# Patient Record
Sex: MALE | Race: OTHER | HISPANIC OR LATINO | URBAN - METROPOLITAN AREA
[De-identification: names, ages, dates, MRNs, and addresses within clinical notes are randomized per-mention and may not be internally consistent; named-entity substitution may affect disease eponyms.]

---

## 2018-08-01 ENCOUNTER — OUTPATIENT (OUTPATIENT)
Dept: OUTPATIENT SERVICES | Facility: HOSPITAL | Age: 68
LOS: 1 days | End: 2018-08-01
Payer: MEDICARE

## 2018-08-01 PROCEDURE — G9001: CPT

## 2018-08-23 ENCOUNTER — INPATIENT (INPATIENT)
Facility: HOSPITAL | Age: 68
LOS: 6 days | Discharge: NURSING, REHAB OR LONG-TERM CARE | DRG: 562 | End: 2018-08-30
Attending: HOSPITALIST | Admitting: HOSPITALIST
Payer: MEDICARE

## 2018-08-23 VITALS
RESPIRATION RATE: 20 BRPM | TEMPERATURE: 98 F | DIASTOLIC BLOOD PRESSURE: 79 MMHG | HEIGHT: 66 IN | SYSTOLIC BLOOD PRESSURE: 160 MMHG | HEART RATE: 82 BPM | OXYGEN SATURATION: 98 % | WEIGHT: 225.09 LBS

## 2018-08-23 LAB
ALBUMIN SERPL ELPH-MCNC: 3.9 G/DL — SIGNIFICANT CHANGE UP (ref 3.3–5.2)
ALP SERPL-CCNC: 65 U/L — SIGNIFICANT CHANGE UP (ref 40–120)
ALT FLD-CCNC: 31 U/L — SIGNIFICANT CHANGE UP
ANION GAP SERPL CALC-SCNC: 18 MMOL/L — HIGH (ref 5–17)
APTT BLD: 33.2 SEC — SIGNIFICANT CHANGE UP (ref 27.5–37.4)
AST SERPL-CCNC: 28 U/L — SIGNIFICANT CHANGE UP
BASOPHILS # BLD AUTO: 0 K/UL — SIGNIFICANT CHANGE UP (ref 0–0.2)
BASOPHILS NFR BLD AUTO: 0.2 % — SIGNIFICANT CHANGE UP (ref 0–2)
BILIRUB SERPL-MCNC: 0.2 MG/DL — LOW (ref 0.4–2)
BUN SERPL-MCNC: 35 MG/DL — HIGH (ref 8–20)
CALCIUM SERPL-MCNC: 9.6 MG/DL — SIGNIFICANT CHANGE UP (ref 8.6–10.2)
CHLORIDE SERPL-SCNC: 94 MMOL/L — LOW (ref 98–107)
CO2 SERPL-SCNC: 31 MMOL/L — HIGH (ref 22–29)
CREAT SERPL-MCNC: 1.33 MG/DL — HIGH (ref 0.5–1.3)
EOSINOPHIL # BLD AUTO: 0.2 K/UL — SIGNIFICANT CHANGE UP (ref 0–0.5)
EOSINOPHIL NFR BLD AUTO: 2.1 % — SIGNIFICANT CHANGE UP (ref 0–5)
GLUCOSE SERPL-MCNC: 137 MG/DL — HIGH (ref 70–115)
HCT VFR BLD CALC: 35.3 % — LOW (ref 42–52)
HGB BLD-MCNC: 11.4 G/DL — LOW (ref 14–18)
INR BLD: 0.99 RATIO — SIGNIFICANT CHANGE UP (ref 0.88–1.16)
LYMPHOCYTES # BLD AUTO: 1.3 K/UL — SIGNIFICANT CHANGE UP (ref 1–4.8)
LYMPHOCYTES # BLD AUTO: 15.3 % — LOW (ref 20–55)
MCHC RBC-ENTMCNC: 28.9 PG — SIGNIFICANT CHANGE UP (ref 27–31)
MCHC RBC-ENTMCNC: 32.3 G/DL — SIGNIFICANT CHANGE UP (ref 32–36)
MCV RBC AUTO: 89.6 FL — SIGNIFICANT CHANGE UP (ref 80–94)
MONOCYTES # BLD AUTO: 0.5 K/UL — SIGNIFICANT CHANGE UP (ref 0–0.8)
MONOCYTES NFR BLD AUTO: 6.2 % — SIGNIFICANT CHANGE UP (ref 3–10)
NEUTROPHILS # BLD AUTO: 6.6 K/UL — SIGNIFICANT CHANGE UP (ref 1.8–8)
NEUTROPHILS NFR BLD AUTO: 75.9 % — HIGH (ref 37–73)
PLATELET # BLD AUTO: 256 K/UL — SIGNIFICANT CHANGE UP (ref 150–400)
POTASSIUM SERPL-MCNC: 3.4 MMOL/L — LOW (ref 3.5–5.3)
POTASSIUM SERPL-SCNC: 3.4 MMOL/L — LOW (ref 3.5–5.3)
PROT SERPL-MCNC: 7.9 G/DL — SIGNIFICANT CHANGE UP (ref 6.6–8.7)
PROTHROM AB SERPL-ACNC: 10.9 SEC — SIGNIFICANT CHANGE UP (ref 9.8–12.7)
RBC # BLD: 3.94 M/UL — LOW (ref 4.6–6.2)
RBC # FLD: 16.6 % — HIGH (ref 11–15.6)
SODIUM SERPL-SCNC: 143 MMOL/L — SIGNIFICANT CHANGE UP (ref 135–145)
WBC # BLD: 8.7 K/UL — SIGNIFICANT CHANGE UP (ref 4.8–10.8)
WBC # FLD AUTO: 8.7 K/UL — SIGNIFICANT CHANGE UP (ref 4.8–10.8)

## 2018-08-23 PROCEDURE — 73560 X-RAY EXAM OF KNEE 1 OR 2: CPT | Mod: 26,LT

## 2018-08-23 PROCEDURE — 99285 EMERGENCY DEPT VISIT HI MDM: CPT

## 2018-08-23 PROCEDURE — 72125 CT NECK SPINE W/O DYE: CPT | Mod: 26

## 2018-08-23 PROCEDURE — 93970 EXTREMITY STUDY: CPT | Mod: 26

## 2018-08-23 PROCEDURE — 70450 CT HEAD/BRAIN W/O DYE: CPT | Mod: 26

## 2018-08-23 RX ORDER — MORPHINE SULFATE 50 MG/1
2 CAPSULE, EXTENDED RELEASE ORAL ONCE
Qty: 0 | Refills: 0 | Status: DISCONTINUED | OUTPATIENT
Start: 2018-08-23 | End: 2018-08-23

## 2018-08-23 RX ADMIN — MORPHINE SULFATE 2 MILLIGRAM(S): 50 CAPSULE, EXTENDED RELEASE ORAL at 19:20

## 2018-08-23 RX ADMIN — MORPHINE SULFATE 2 MILLIGRAM(S): 50 CAPSULE, EXTENDED RELEASE ORAL at 18:49

## 2018-08-23 NOTE — ED ADULT NURSE NOTE - OBJECTIVE STATEMENT
pt was at day care at [facility] and fell and hit his head he didn't LOC and denied pain

## 2018-08-23 NOTE — ED ADULT NURSE REASSESSMENT NOTE - NS ED NURSE REASSESS COMMENT FT1
rcvd pt at baseline mental states and family at bedside, pt sitting in stretcher and appears comfortable, resp even and unlabored no distress noted, pt in lowest position and side rail up, will continue to monitor

## 2018-08-23 NOTE — ED PROVIDER NOTE - MEDICAL DECISION MAKING DETAILS
69 yo male presents after a mechanical fall. Will perform labs, head/neck CT, and L knee xray to eval for fracture. rosmery LE u/s to eval for dvt. reevaluate.

## 2018-08-23 NOTE — ED ADULT NURSE NOTE - NSIMPLEMENTINTERV_GEN_ALL_ED
Implemented All Fall with Harm Risk Interventions:  [place] to call system. Call bell, personal items and telephone within reach. Instruct patient to call for assistance. Room bathroom lighting operational. Non-slip footwear when patient is off stretcher. Physically safe environment: no spills, clutter or unnecessary equipment. Stretcher in lowest position, wheels locked, appropriate side rails in place. Provide visual cue, wrist band, yellow gown, etc. Monitor gait and stability. Monitor for mental status changes and reorient to person, place, and time. Review medications for side effects contributing to fall risk. Reinforce activity limits and safety measures with patient and family. Provide visual clues: red socks.

## 2018-08-23 NOTE — ED ADULT TRIAGE NOTE - CHIEF COMPLAINT QUOTE
Patient presents to ER complaining of left knee pain and hematoma to left forehead, patient denies LOC, no blood thinners.

## 2018-08-23 NOTE — ED PROVIDER NOTE - OBJECTIVE STATEMENT
69 yo male with a pmh of MR, HTN, HLD, and DM present after a fall at his  facility. Pt's does not hear well and is a poor historian so history was given by a worker at the  facility. Pt slipped and fell on water and landed on his left side. On the fall he hit his head and knee that is now in pain. There is no know n/v or loss of consciousness after the event.

## 2018-08-23 NOTE — ED PROVIDER NOTE - PMH
Anemia, unspecified type    Diabetes mellitus of other type with complication    Hyperlipidemia, unspecified hyperlipidemia type    Hypertension, unspecified type

## 2018-08-23 NOTE — ED PROVIDER NOTE - PHYSICAL EXAMINATION
MSK: skin is clean, dry, and intact. L calf is warm. edema to BLE. pedal pulse present rosmery. pain with palpation  and ROM of left knee.

## 2018-08-23 NOTE — ED PROVIDER NOTE - PROGRESS NOTE DETAILS
Pt signed out to me by Dr. Cunha pending US and CT results, all wnl, pt at baseline as per family member will help arrange transport home Pt signed out to me by Dr. Cunha pending US and CT results, all wnl, pt unable to ambulate due to pain, will have SW see in the AM for rehab and help at home Signed out to Dr. Cunha awaiting SW

## 2018-08-24 DIAGNOSIS — S82.209A UNSPECIFIED FRACTURE OF SHAFT OF UNSPECIFIED TIBIA, INITIAL ENCOUNTER FOR CLOSED FRACTURE: ICD-10-CM

## 2018-08-24 LAB — GLUCOSE BLDC GLUCOMTR-MCNC: 241 MG/DL — HIGH (ref 70–99)

## 2018-08-24 PROCEDURE — 73700 CT LOWER EXTREMITY W/O DYE: CPT | Mod: 26,LT

## 2018-08-24 PROCEDURE — 99223 1ST HOSP IP/OBS HIGH 75: CPT

## 2018-08-24 RX ORDER — INSULIN LISPRO 100/ML
VIAL (ML) SUBCUTANEOUS
Qty: 0 | Refills: 0 | Status: DISCONTINUED | OUTPATIENT
Start: 2018-08-24 | End: 2018-08-30

## 2018-08-24 RX ORDER — ATORVASTATIN CALCIUM 80 MG/1
40 TABLET, FILM COATED ORAL AT BEDTIME
Qty: 0 | Refills: 0 | Status: DISCONTINUED | OUTPATIENT
Start: 2018-08-24 | End: 2018-08-30

## 2018-08-24 RX ORDER — SODIUM CHLORIDE 9 MG/ML
1000 INJECTION, SOLUTION INTRAVENOUS
Qty: 0 | Refills: 0 | Status: DISCONTINUED | OUTPATIENT
Start: 2018-08-24 | End: 2018-08-30

## 2018-08-24 RX ORDER — GLUCAGON INJECTION, SOLUTION 0.5 MG/.1ML
1 INJECTION, SOLUTION SUBCUTANEOUS ONCE
Qty: 0 | Refills: 0 | Status: DISCONTINUED | OUTPATIENT
Start: 2018-08-24 | End: 2018-08-30

## 2018-08-24 RX ORDER — ACETAMINOPHEN 500 MG
650 TABLET ORAL ONCE
Qty: 0 | Refills: 0 | Status: COMPLETED | OUTPATIENT
Start: 2018-08-24 | End: 2018-08-24

## 2018-08-24 RX ORDER — DEXTROSE 50 % IN WATER 50 %
15 SYRINGE (ML) INTRAVENOUS ONCE
Qty: 0 | Refills: 0 | Status: DISCONTINUED | OUTPATIENT
Start: 2018-08-24 | End: 2018-08-30

## 2018-08-24 RX ORDER — DEXTROSE 50 % IN WATER 50 %
12.5 SYRINGE (ML) INTRAVENOUS ONCE
Qty: 0 | Refills: 0 | Status: DISCONTINUED | OUTPATIENT
Start: 2018-08-24 | End: 2018-08-30

## 2018-08-24 RX ORDER — DEXTROSE 50 % IN WATER 50 %
25 SYRINGE (ML) INTRAVENOUS ONCE
Qty: 0 | Refills: 0 | Status: DISCONTINUED | OUTPATIENT
Start: 2018-08-24 | End: 2018-08-30

## 2018-08-24 RX ORDER — IBUPROFEN 200 MG
600 TABLET ORAL ONCE
Qty: 0 | Refills: 0 | Status: COMPLETED | OUTPATIENT
Start: 2018-08-24 | End: 2018-08-24

## 2018-08-24 RX ORDER — OXYCODONE HYDROCHLORIDE 5 MG/1
5 TABLET ORAL EVERY 6 HOURS
Qty: 0 | Refills: 0 | Status: DISCONTINUED | OUTPATIENT
Start: 2018-08-24 | End: 2018-08-30

## 2018-08-24 RX ORDER — FOLIC ACID 0.8 MG
1 TABLET ORAL DAILY
Qty: 0 | Refills: 0 | Status: DISCONTINUED | OUTPATIENT
Start: 2018-08-24 | End: 2018-08-30

## 2018-08-24 RX ORDER — POTASSIUM CHLORIDE 20 MEQ
20 PACKET (EA) ORAL ONCE
Qty: 0 | Refills: 0 | Status: COMPLETED | OUTPATIENT
Start: 2018-08-24 | End: 2018-08-24

## 2018-08-24 RX ORDER — ASPIRIN/CALCIUM CARB/MAGNESIUM 324 MG
325 TABLET ORAL
Qty: 0 | Refills: 0 | Status: DISCONTINUED | OUTPATIENT
Start: 2018-08-24 | End: 2018-08-28

## 2018-08-24 RX ORDER — METFORMIN HYDROCHLORIDE 850 MG/1
0 TABLET ORAL
Qty: 0 | Refills: 0 | COMMUNITY

## 2018-08-24 RX ORDER — LOSARTAN POTASSIUM 100 MG/1
50 TABLET, FILM COATED ORAL DAILY
Qty: 0 | Refills: 0 | Status: DISCONTINUED | OUTPATIENT
Start: 2018-08-24 | End: 2018-08-30

## 2018-08-24 RX ADMIN — Medication 4: at 19:06

## 2018-08-24 RX ADMIN — Medication 650 MILLIGRAM(S): at 01:51

## 2018-08-24 RX ADMIN — Medication 600 MILLIGRAM(S): at 05:12

## 2018-08-24 RX ADMIN — Medication 20 MILLIEQUIVALENT(S): at 18:14

## 2018-08-24 RX ADMIN — ATORVASTATIN CALCIUM 40 MILLIGRAM(S): 80 TABLET, FILM COATED ORAL at 21:05

## 2018-08-24 RX ADMIN — Medication 650 MILLIGRAM(S): at 01:21

## 2018-08-24 RX ADMIN — Medication 325 MILLIGRAM(S): at 18:14

## 2018-08-24 NOTE — PHYSICAL THERAPY INITIAL EVALUATION ADULT - ADDITIONAL COMMENTS
Pt is a poor historian, as per pt, through , he lives in a 2 story house with 1 step to enter, with family, who cares for him.  As per MD, patient goes to a Day Program.

## 2018-08-24 NOTE — ED ADULT NURSE REASSESSMENT NOTE - NS ED NURSE REASSESS COMMENT FT1
Pulses remain present B/L. Brace present, placed by [facility]. No distress present. Safety maintained. RN to continue to monitor and reassess closely.

## 2018-08-24 NOTE — H&P ADULT - ASSESSMENT
68M with a mechanical fall and left leg pain    Tibia fracture - Analgesic medications as needed. Orthopedics consultation pending.    Edema - On diuretic therapy. Will hold due to azotemia on presentation.    Hypokalemia - Potassium supplementation. Repeat laboratory studies ordered to monitor.    Diabetes - Insulin coverage, close monitoring of blood glucose levels.    Discussed with the patient and his niece at the bedside. 68M with a mechanical fall and left leg pain    Tibia fracture - Analgesic medications as needed. Orthopedics consultation pending.    Edema - On diuretic therapy. Will hold due to azotemia on presentation.    Azotemia / Hypokalemia - Potassium supplementation. Repeat laboratory studies ordered to monitor.    Diabetes - Insulin coverage, close monitoring of blood glucose levels.    Discussed with the patient and his niece at the bedside.

## 2018-08-24 NOTE — DISCHARGE NOTE ADULT - MEDICATION SUMMARY - MEDICATIONS TO TAKE
I will START or STAY ON the medications listed below when I get home from the hospital:    meloxicam 15 mg oral tablet  -- 1 tab(s) by mouth once a day  -- Indication: For Closed fracture of shaft of tibia    acetaminophen 325 mg oral tablet  -- 2 tab(s) by mouth every 6 hours, As needed, For Temp greater than 38 C (100.4 F)  -- Indication: For Closed fracture of shaft of tibia    Vicodin 5 mg-300 mg oral tablet  -- 1 tab(s) by mouth every 6 hours, As Needed  -- Indication: For Closed fracture of shaft of tibia    aspirin 325 mg oral delayed release tablet  -- 1 tab(s) by mouth once a day  -- Indication: For Closed fracture of shaft of tibia    losartan 50 mg oral tablet  -- 1 tab(s) by mouth once a day  -- Indication: For Htn    aluminum hydroxide-magnesium hydroxide 200 mg-200 mg/5 mL oral suspension  -- 30 milliliter(s) by mouth every 6 hours, As needed, Dyspepsia  -- Indication: For supplementation    enoxaparin  -- 40 milligram(s) subcutaneous once a day  -- Indication: For Dvt prophylaxis for two weeks    metFORMIN 1000 mg oral tablet  -- 1 tab(s) by mouth 2 times a day  -- Indication: For Diabetes mellitus of other type with complication    Crestor 20 mg oral tablet  -- 1 tab(s) by mouth once a day (at bedtime)  -- Indication: For Hyperlipidemia, unspecified hyperlipidemia type    simethicone 80 mg oral tablet, chewable  -- 1 tab(s) by mouth 4 times a day  -- Indication: For gas    saccharomyces boulardii lyo 250 mg oral capsule  -- 1 cap(s) by mouth 2 times a day, stop on 09/02  -- Indication: For Probiotics    pantoprazole 40 mg oral delayed release tablet  -- 1 tab(s) by mouth once a day (before a meal)  -- Indication: For gi prophylaxis    levoFLOXacin 500 mg oral tablet  -- 1 tab(s) by mouth every 24 hours  -- Indication: For Pneumonia of lower lobe due to infectious organism, unspecified laterality    folic acid 1 mg oral tablet  -- 1 tab(s) by mouth once a day  -- Indication: For supplementation

## 2018-08-24 NOTE — DISCHARGE NOTE ADULT - CARE PROVIDER_API CALL
Osvaldo Petty), Orthopaedic Surgery  [address] [place] [address]  Phone: [phone]  Fax: [phone]

## 2018-08-24 NOTE — PROVIDER CONTACT NOTE (OTHER) - ASSESSMENT
Pt with c/o left knee pain before, during and after RX, pt unable to qualify.  Pt will benefit from PT to maximize functional independence.  Will continue to follow.  Pt left supine in bed in no apparent distress and call bell within reach. Nurse aware.

## 2018-08-24 NOTE — DISCHARGE NOTE ADULT - CARE PLAN
Principal Discharge DX:	Other closed fracture of proximal end of left tibia, initial encounter  Goal:	improve fx healing  Assessment and plan of treatment:	Patient will be NWB left leg with maria brace locked 0-30 degrees. Patient may remove maria for bathing only and may NOT flex knee without brace on. Patient should follow up with Dr Petty in 1-2 weeks as out patient.

## 2018-08-24 NOTE — CONSULT NOTE ADULT - SUBJECTIVE AND OBJECTIVE BOX
Pt Name: YAMIL OLEA    MRN: [id number]      Patient is a 68y MR Male whom is having left left knee pain s/p fall yesterday while at adult day care. Patient states he fell, yet he is unable to give me details. Spoke to patients HCP Aminah who informs that he tripped over a wheelchair yesterday. Patient was brought here due to inability to walk without pain since fall.     HEALTH ISSUES - PROBLEM Dx: Left knee pain      REVIEW OF SYSTEMS    Musculoskeletal:	 see HPI    ROS is otherwise negative.    PAST MEDICAL & SURGICAL HISTORY:  PAST MEDICAL & SURGICAL HISTORY:  Anemia, unspecified type  Hyperlipidemia, unspecified hyperlipidemia type  Diabetes mellitus of other type with complication  Hypertension, unspecified type  No significant past surgical history      Allergies: No Known Allergies      Medications: atorvastatin 40 milliGRAM(s) Oral at bedtime  dextrose 40% Gel 15 Gram(s) Oral once PRN  dextrose 5%. 1000 milliLiter(s) IV Continuous <Continuous>  dextrose 50% Injectable 12.5 Gram(s) IV Push once  dextrose 50% Injectable 25 Gram(s) IV Push once  dextrose 50% Injectable 25 Gram(s) IV Push once  folic acid 1 milliGRAM(s) Oral daily  glucagon  Injectable 1 milliGRAM(s) IntraMuscular once PRN  insulin lispro (HumaLOG) corrective regimen sliding scale   SubCutaneous three times a day before meals  losartan 50 milliGRAM(s) Oral daily  oxyCODONE    IR 5 milliGRAM(s) Oral every 6 hours PRN  potassium chloride    Tablet ER 20 milliEquivalent(s) Oral once      FAMILY HISTORY:  No pertinent family history in first degree relatives  : non-contributory    Social History:     Ambulation: Walking independently  prior to injury                        11.4   8.7   )-----------( 256      ( 23 Aug 2018 18:42 )             35.3     08-23    143  |  94<L>  |  35.0<H>  ----------------------------<  137<H>  3.4<L>   |  31.0<H>  |  1.33<H>    Ca    9.6      23 Aug 2018 18:42    TPro  7.9  /  Alb  3.9  /  TBili  0.2<L>  /  DBili  x   /  AST  28  /  ALT  31  /  AlkPhos  65  08-23      PHYSICAL EXAM:    Vital Signs Last 24 Hrs  T(C): 36.9 (24 Aug 2018 10:53), Max: 37.3 (23 Aug 2018 19:42)  T(F): 98.4 (24 Aug 2018 10:53), Max: 99.1 (23 Aug 2018 19:42)  HR: 85 (24 Aug 2018 10:53) (82 - 92)  BP: 149/64 (24 Aug 2018 10:53) (121/64 - 160/79)  BP(mean): --  RR: 19 (24 Aug 2018 10:53) (18 - 20)  SpO2: 94% (24 Aug 2018 10:53) (94% - 98%)  Daily Height in cm: 167.64 (23 Aug 2018 15:30)    Daily     Appearance: Alert, responsive, in no acute distress.  Neurological: Sensation is grossly intact to light touch. 5/5 motor function of all extremities. No focal deficits or weaknesses found.  Skin: Skin is clean, dry and intact. No bleeding. Right lower leg has healing abrasions with well healing scab formation  Vascular: 2+ distal pulses. Cap refill < 2 sec.  Musculoskeletal: Left Lower Extremity: Pain to palpation of the distal knee/ proximal tibia region more anteriorly. Calf soft, supple and nontender. Mild swelling noted         Imaging Studies:  EXAM:  KNEE-LEFT                          PROCEDURE DATE:  08/23/2018          INTERPRETATION:  LEFT KNEE: AP, LATERAL, OBLIQUE    CLINICAL INFORMATION: fall left knee pain following fall.      FINDINGS:    A knee joint effusion is present.    There is a minimally displaced fracture involving the intercondylar   tibial eminence.  There is minimal lateral translation of the proximal tibia relation to   the distal femur, correlate for ligamentous injury.    IMPRESSION:    Fracture intercondylar tibial eminence.  Recommend further clinical correlation for cruciate ligament and   collateral ligament injury.    Findings are reported via the PACS ED discrepancy workflow process at the   time of interpretation on 8/24/2018.    See separate dictated report from CT scan of the knee of 8/24/2018.          JUSTIN UPTON M.D., ATTENDING RADIOLOGIST  This document has been electronically signed. Aug 24 2018  9:49AM       EXAM:  CT KNEE ONLY LT                          PROCEDURE DATE:  08/24/2018          INTERPRETATION:  CT of the left knee     CLINICAL INDICATION: Left knee pain. Fracture.    TECHNIQUE: Axial CT images were obtained of the left knee with coronal   and sagittal reconstructions. No contrast was administered. Three   dimensional reconstructions were obtained on an independent workstation.    FINDINGS:    There is a comminuted proximal tibial fracture involving the medial and   lateral tibial spines. The fracture involves the base of the tibial   spines and extends peripherally toward the inner margin of the medial   lateral tibial plateaus.. The fracture extends all the way through the   posterior aspect of the proximal tibia/tibial spines and there is slight   osseous fragmentation there is slight resultant incongruity along the   inner margin of the posterior aspect the medial tibial plateau. The   fracture is not depressed. There is a large knee effusion and a moderate   degree of surrounding extracapsular soft tissue edema.    No other fractures are identified.    IMPRESSION:    Comminuted proximal tibial fracture as above.      KINDRA MONSIVAIS M.D., ATTENDING RADIOLOGIST  This document has been electronically signed. Aug 24 2018  9:07AM        A/P:  Pt is a 69yo MR Male with left comminuted tibial plateau fx with mild displacement    PLAN:   * Discussed case with Dr Petty  * Patient placed in a knee immobilizer  * Orthotist called for maria brace 0-30 degrees  * Patient should be NWB left LE  * Follow up as out patient with Dr Petty in 1-2 weeks

## 2018-08-24 NOTE — ED ADULT NURSE REASSESSMENT NOTE - NS ED NURSE REASSESS COMMENT FT1
Pt resting in stretcher and remains with even and unlabored respirations. Denies nausea and vomiting. Denies HA. Denies CP or SOB. Provided with warm blankets and water. RN to continue to monitor and reassess closely.

## 2018-08-24 NOTE — DISCHARGE NOTE ADULT - PLAN OF CARE
improve fx healing Patient will be NWB left leg with maria brace locked 0-30 degrees. Patient may remove maria for bathing only and may NOT flex knee without brace on. Patient should follow up with Dr Petty in 1-2 weeks as out patient.

## 2018-08-24 NOTE — DISCHARGE NOTE ADULT - HOSPITAL COURSE
is a 67 y/o male w/PMhx of mental deficiency, who presented with left sided leg pain after sustaining a fall earlier before on day of admission at his day care. As per his niece Aminah, he was on lasix, metolazone for LE edema (no hx of CHF).    On admission, he had a brace placed by orthopedics for the closed tibial fracture. His course was complicated by SIRS, as he began to have fevers up to 102, and was nonverbal on admission, toxic appearing. His renal failure resolved with the initiation of IVF and his infectious w/u was negative, apart from RLL crackles on lung exam that improved after abx initiation. He received 4 days of IV vanc/zosyn with resolution of fevers and a return to baseline clinical state, with a substantial decrease in CRP. His urine was negative, blood culture was negative, and he did not have much sputum production.    I suspect he had a mild pneumonia, was on high diuretics that were being used for dependent edema, which dehydrated him, and caused him to fall, fracturing his tibia. Furthermore, he had significant kidney injury from the dehydration, and SIRS. I spoke with his niece Aminah who agrees with my assessment and I've asked her to please not allow diuretics for him, he can use compression stockings and elevate the legs. She agreed. She wanted a CAT scan as she was concerned about his abdomen being distended, and his CT scan demonstrates no pathology, no signs of hernia. He has abdominal obesity and I've recommended that he resume metformin (now okay to do as his creatinine is excellent). Aminah understands the plan of care, I also recommended diet and exercise.     He can be d/cd to [facility] today and Aminah is in full agreement with the d/c plan, and she's very pleased with his care here at [facility].

## 2018-08-24 NOTE — DISCHARGE NOTE ADULT - MEDICATION SUMMARY - MEDICATIONS TO STOP TAKING
I will STOP taking the medications listed below when I get home from the hospital:    Micardis 80 mg oral tablet  -- 1 tab(s) by mouth once a day    Lasix 80 mg oral tablet  -- 1 tab(s) by mouth once a day    metOLazone 5 mg oral tablet  -- 1 tab(s) by mouth once a day    glimepiride 1 mg oral tablet  -- 1 tab(s) by mouth once a day    potassium chloride 10 mEq oral tablet, extended release  -- 1 tab(s) by mouth once a day    meloxicam 15 mg oral tablet  -- 1 tab(s) by mouth once a day

## 2018-08-24 NOTE — PHYSICAL THERAPY INITIAL EVALUATION ADULT - RANGE OF MOTION EXAMINATION, REHAB EVAL
bilateral lower extremity ROM was WFL (within functional limits)/except left knee limited due to pain./bilateral upper extremity ROM was WFL (within functional limits)

## 2018-08-24 NOTE — DISCHARGE NOTE ADULT - PATIENT PORTAL LINK FT
You can access the [facility] Patient Portal, offered by [facility], by registering with the following website: [email or web address]

## 2018-08-24 NOTE — H&P ADULT - HISTORY OF PRESENT ILLNESS
68M presented with left sided leg pain after sustaining a fall earlier today. Information was obtained after review of the transfer documentation and discussion with his niece at the bedside. The patient reportedly had a mechanical fall after tripping over a wheelchair during adult day care. When he fell, he had his head and left leg. He had no prior falls or injuries in the past. Prior to this, the patient was noted to be in his usual state of health. The patient had attempted to walk but this resulted in significant pain. The pain is worsened with ambulation and weight bearing. The patient 68M presented with left sided leg pain after sustaining a fall earlier today. Information was obtained after review of the transfer documentation and discussion with his niece at the bedside. The patient reportedly had a mechanical fall after tripping over a wheelchair during adult day care. There was no reported loss of consciousness. When he fell, he had his head and left leg. He had no prior falls or injuries in the past. Prior to this, the patient was noted to be in his usual state of health. The patient had attempted to walk but this resulted in significant pain. The pain is worsened with ambulation and weight bearing. Further information was limited due to the patient's underlying medical condition.

## 2018-08-24 NOTE — H&P ADULT - NSHPPHYSICALEXAM_GEN_ALL_CORE
Vital Signs Last 24 Hrs  T(C): 36.9 (24 Aug 2018 10:53), Max: 37.3 (23 Aug 2018 19:42)  T(F): 98.4 (24 Aug 2018 10:53), Max: 99.1 (23 Aug 2018 19:42)  HR: 85 (24 Aug 2018 10:53) (82 - 92)  BP: 149/64 (24 Aug 2018 10:53) (121/64 - 160/79)  BP(mean): --  RR: 19 (24 Aug 2018 10:53) (18 - 20)  SpO2: 94% (24 Aug 2018 10:53) (94% - 98%)    General appearance: No acute distress, Awake, Alert  HEENT: Normocephalic, Atraumatic, Conjunctiva clear, EOMI  Neck: Supple, No JVD, No tenderness  Lungs: Clear to auscultation, Breath sound equal bilaterally, No wheezes, No rales  Cardiovascular: S1S2, Regular rhythm  Abdomen: Soft, Nontender, Nondistended, No guarding/rebound, Positive bowel sounds  Extremities: No clubbing, No cyanosis, No edema, No calf tenderness, Range of motion limited by pain  Neuro: Strength equal bilaterally, No tremors  Psychiatric: Appropriate mood, Normal affect Vital Signs Last 24 Hrs  T(C): 36.9 (24 Aug 2018 10:53), Max: 37.3 (23 Aug 2018 19:42)  T(F): 98.4 (24 Aug 2018 10:53), Max: 99.1 (23 Aug 2018 19:42)  HR: 85 (24 Aug 2018 10:53) (82 - 92)  BP: 149/64 (24 Aug 2018 10:53) (121/64 - 160/79)  BP(mean): --  RR: 19 (24 Aug 2018 10:53) (18 - 20)  SpO2: 94% (24 Aug 2018 10:53) (94% - 98%)    General appearance: No acute distress, Awake, Alert  HEENT: Normocephalic, Atraumatic, Conjunctiva clear, EOMI  Neck: Supple, No JVD, No tenderness  Lungs: Clear to auscultation, Breath sound equal bilaterally, No wheezes, No rales  Cardiovascular: S1S2, Regular rhythm  Abdomen: Soft, Nontender, Nondistended, No guarding/rebound, Positive bowel sounds  Extremities: No clubbing, No cyanosis, No calf tenderness, Bilateral lower extremity edema, Range of motion limited by pain  Neuro: Strength equal bilaterally, No tremors  Psychiatric: Appropriate mood, Normal affect

## 2018-08-24 NOTE — ED ADULT NURSE REASSESSMENT NOTE - NS ED NURSE REASSESS COMMENT FT1
Pt received awake, alert, and following commands. Pt easily arousable to verbal and tactile stimuli. +2 pedal pulses present B/L. Denies HA or chest pain. No distress present. Awaiting PT and SW. RN to continue to monitor and reassess closely. Safety maintained at all times.

## 2018-08-25 DIAGNOSIS — N17.1 ACUTE KIDNEY FAILURE WITH ACUTE CORTICAL NECROSIS: ICD-10-CM

## 2018-08-25 DIAGNOSIS — S82.192A OTHER FRACTURE OF UPPER END OF LEFT TIBIA, INITIAL ENCOUNTER FOR CLOSED FRACTURE: ICD-10-CM

## 2018-08-25 DIAGNOSIS — E13.8 OTHER SPECIFIED DIABETES MELLITUS WITH UNSPECIFIED COMPLICATIONS: ICD-10-CM

## 2018-08-25 LAB
ANION GAP SERPL CALC-SCNC: 13 MMOL/L — SIGNIFICANT CHANGE UP (ref 5–17)
BUN SERPL-MCNC: 40 MG/DL — HIGH (ref 8–20)
CALCIUM SERPL-MCNC: 9 MG/DL — SIGNIFICANT CHANGE UP (ref 8.6–10.2)
CHLORIDE SERPL-SCNC: 97 MMOL/L — LOW (ref 98–107)
CO2 SERPL-SCNC: 30 MMOL/L — HIGH (ref 22–29)
CREAT SERPL-MCNC: 1.83 MG/DL — HIGH (ref 0.5–1.3)
GLUCOSE BLDC GLUCOMTR-MCNC: 127 MG/DL — HIGH (ref 70–99)
GLUCOSE BLDC GLUCOMTR-MCNC: 130 MG/DL — HIGH (ref 70–99)
GLUCOSE BLDC GLUCOMTR-MCNC: 146 MG/DL — HIGH (ref 70–99)
GLUCOSE BLDC GLUCOMTR-MCNC: 154 MG/DL — HIGH (ref 70–99)
GLUCOSE SERPL-MCNC: 160 MG/DL — HIGH (ref 70–115)
HBA1C BLD-MCNC: 7 % — HIGH (ref 4–5.6)
HCT VFR BLD CALC: 34.2 % — LOW (ref 42–52)
HGB BLD-MCNC: 10.6 G/DL — LOW (ref 14–18)
MCHC RBC-ENTMCNC: 28.7 PG — SIGNIFICANT CHANGE UP (ref 27–31)
MCHC RBC-ENTMCNC: 31 G/DL — LOW (ref 32–36)
MCV RBC AUTO: 92.7 FL — SIGNIFICANT CHANGE UP (ref 80–94)
PLATELET # BLD AUTO: 230 K/UL — SIGNIFICANT CHANGE UP (ref 150–400)
POTASSIUM SERPL-MCNC: 3.6 MMOL/L — SIGNIFICANT CHANGE UP (ref 3.5–5.3)
POTASSIUM SERPL-SCNC: 3.6 MMOL/L — SIGNIFICANT CHANGE UP (ref 3.5–5.3)
RBC # BLD: 3.69 M/UL — LOW (ref 4.6–6.2)
RBC # FLD: 17.2 % — HIGH (ref 11–15.6)
SODIUM SERPL-SCNC: 140 MMOL/L — SIGNIFICANT CHANGE UP (ref 135–145)
WBC # BLD: 15.6 K/UL — HIGH (ref 4.8–10.8)
WBC # FLD AUTO: 15.6 K/UL — HIGH (ref 4.8–10.8)

## 2018-08-25 PROCEDURE — 99233 SBSQ HOSP IP/OBS HIGH 50: CPT

## 2018-08-25 RX ORDER — ACETAMINOPHEN 500 MG
650 TABLET ORAL EVERY 6 HOURS
Qty: 0 | Refills: 0 | Status: DISCONTINUED | OUTPATIENT
Start: 2018-08-25 | End: 2018-08-30

## 2018-08-25 RX ORDER — SODIUM CHLORIDE 9 MG/ML
1000 INJECTION, SOLUTION INTRAVENOUS
Qty: 0 | Refills: 0 | Status: DISCONTINUED | OUTPATIENT
Start: 2018-08-25 | End: 2018-08-27

## 2018-08-25 RX ORDER — ENOXAPARIN SODIUM 100 MG/ML
40 INJECTION SUBCUTANEOUS EVERY 24 HOURS
Qty: 0 | Refills: 0 | Status: DISCONTINUED | OUTPATIENT
Start: 2018-08-25 | End: 2018-08-30

## 2018-08-25 RX ADMIN — Medication 325 MILLIGRAM(S): at 17:02

## 2018-08-25 RX ADMIN — ENOXAPARIN SODIUM 40 MILLIGRAM(S): 100 INJECTION SUBCUTANEOUS at 14:38

## 2018-08-25 RX ADMIN — LOSARTAN POTASSIUM 50 MILLIGRAM(S): 100 TABLET, FILM COATED ORAL at 05:43

## 2018-08-25 RX ADMIN — ATORVASTATIN CALCIUM 40 MILLIGRAM(S): 80 TABLET, FILM COATED ORAL at 21:40

## 2018-08-25 RX ADMIN — Medication 325 MILLIGRAM(S): at 05:43

## 2018-08-25 RX ADMIN — SODIUM CHLORIDE 75 MILLILITER(S): 9 INJECTION, SOLUTION INTRAVENOUS at 14:39

## 2018-08-25 RX ADMIN — Medication 2: at 07:53

## 2018-08-25 RX ADMIN — Medication 1 MILLIGRAM(S): at 12:25

## 2018-08-25 NOTE — PROGRESS NOTE ADULT - PROBLEM SELECTOR PLAN 1
-appreciate orthopedics f/u  -cont brace  -cont pain meds prn -appreciate orthopedics f/u  -cont brace  -cont pain meds prn  -leukocytosis is most likely stress related

## 2018-08-26 DIAGNOSIS — J18.1 LOBAR PNEUMONIA, UNSPECIFIED ORGANISM: ICD-10-CM

## 2018-08-26 LAB
ANION GAP SERPL CALC-SCNC: 15 MMOL/L — SIGNIFICANT CHANGE UP (ref 5–17)
APPEARANCE UR: CLEAR — SIGNIFICANT CHANGE UP
BACTERIA # UR AUTO: ABNORMAL
BILIRUB UR-MCNC: NEGATIVE — SIGNIFICANT CHANGE UP
BUN SERPL-MCNC: 28 MG/DL — HIGH (ref 8–20)
CALCIUM SERPL-MCNC: 9.4 MG/DL — SIGNIFICANT CHANGE UP (ref 8.6–10.2)
CHLORIDE SERPL-SCNC: 97 MMOL/L — LOW (ref 98–107)
CO2 SERPL-SCNC: 30 MMOL/L — HIGH (ref 22–29)
COLOR SPEC: YELLOW — SIGNIFICANT CHANGE UP
CREAT SERPL-MCNC: 1.33 MG/DL — HIGH (ref 0.5–1.3)
CRP SERPL-MCNC: 21.9 MG/DL — HIGH (ref 0–0.4)
DIFF PNL FLD: ABNORMAL
EPI CELLS # UR: SIGNIFICANT CHANGE UP
GLUCOSE BLDC GLUCOMTR-MCNC: 138 MG/DL — HIGH (ref 70–99)
GLUCOSE BLDC GLUCOMTR-MCNC: 145 MG/DL — HIGH (ref 70–99)
GLUCOSE BLDC GLUCOMTR-MCNC: 158 MG/DL — HIGH (ref 70–99)
GLUCOSE SERPL-MCNC: 135 MG/DL — HIGH (ref 70–115)
GLUCOSE UR QL: NEGATIVE MG/DL — SIGNIFICANT CHANGE UP
HCT VFR BLD CALC: 33.3 % — LOW (ref 42–52)
HGB BLD-MCNC: 10.4 G/DL — LOW (ref 14–18)
KETONES UR-MCNC: NEGATIVE — SIGNIFICANT CHANGE UP
LEUKOCYTE ESTERASE UR-ACNC: NEGATIVE — SIGNIFICANT CHANGE UP
MAGNESIUM SERPL-MCNC: 2.3 MG/DL — SIGNIFICANT CHANGE UP (ref 1.6–2.6)
MCHC RBC-ENTMCNC: 29.1 PG — SIGNIFICANT CHANGE UP (ref 27–31)
MCHC RBC-ENTMCNC: 31.2 G/DL — LOW (ref 32–36)
MCV RBC AUTO: 93.3 FL — SIGNIFICANT CHANGE UP (ref 80–94)
NITRITE UR-MCNC: NEGATIVE — SIGNIFICANT CHANGE UP
PH UR: 6.5 — SIGNIFICANT CHANGE UP (ref 5–8)
PHOSPHATE SERPL-MCNC: 2.6 MG/DL — SIGNIFICANT CHANGE UP (ref 2.4–4.7)
PLATELET # BLD AUTO: 217 K/UL — SIGNIFICANT CHANGE UP (ref 150–400)
POTASSIUM SERPL-MCNC: 3.5 MMOL/L — SIGNIFICANT CHANGE UP (ref 3.5–5.3)
POTASSIUM SERPL-SCNC: 3.5 MMOL/L — SIGNIFICANT CHANGE UP (ref 3.5–5.3)
PROCALCITONIN SERPL-MCNC: 1.21 NG/ML — HIGH (ref 0–0.04)
PROT UR-MCNC: 100 MG/DL
RBC # BLD: 3.57 M/UL — LOW (ref 4.6–6.2)
RBC # FLD: 17.1 % — HIGH (ref 11–15.6)
RBC CASTS # UR COMP ASSIST: SIGNIFICANT CHANGE UP /HPF (ref 0–4)
SODIUM SERPL-SCNC: 142 MMOL/L — SIGNIFICANT CHANGE UP (ref 135–145)
SP GR SPEC: 1.01 — SIGNIFICANT CHANGE UP (ref 1.01–1.02)
UROBILINOGEN FLD QL: 1 MG/DL
WBC # BLD: 13.8 K/UL — HIGH (ref 4.8–10.8)
WBC # FLD AUTO: 13.8 K/UL — HIGH (ref 4.8–10.8)
WBC UR QL: SIGNIFICANT CHANGE UP

## 2018-08-26 PROCEDURE — 99233 SBSQ HOSP IP/OBS HIGH 50: CPT

## 2018-08-26 PROCEDURE — 71045 X-RAY EXAM CHEST 1 VIEW: CPT | Mod: 26

## 2018-08-26 RX ORDER — VANCOMYCIN HCL 1 G
1000 VIAL (EA) INTRAVENOUS ONCE
Qty: 0 | Refills: 0 | Status: DISCONTINUED | OUTPATIENT
Start: 2018-08-26 | End: 2018-08-26

## 2018-08-26 RX ORDER — VANCOMYCIN HCL 1 G
VIAL (EA) INTRAVENOUS
Qty: 0 | Refills: 0 | Status: DISCONTINUED | OUTPATIENT
Start: 2018-08-26 | End: 2018-08-26

## 2018-08-26 RX ORDER — VANCOMYCIN HCL 1 G
1000 VIAL (EA) INTRAVENOUS ONCE
Qty: 0 | Refills: 0 | Status: COMPLETED | OUTPATIENT
Start: 2018-08-26 | End: 2018-08-26

## 2018-08-26 RX ORDER — VANCOMYCIN HCL 1 G
750 VIAL (EA) INTRAVENOUS EVERY 12 HOURS
Qty: 0 | Refills: 0 | Status: DISCONTINUED | OUTPATIENT
Start: 2018-08-27 | End: 2018-08-27

## 2018-08-26 RX ORDER — VANCOMYCIN HCL 1 G
VIAL (EA) INTRAVENOUS
Qty: 0 | Refills: 0 | Status: DISCONTINUED | OUTPATIENT
Start: 2018-08-26 | End: 2018-08-27

## 2018-08-26 RX ORDER — PIPERACILLIN AND TAZOBACTAM 4; .5 G/20ML; G/20ML
3.38 INJECTION, POWDER, LYOPHILIZED, FOR SOLUTION INTRAVENOUS EVERY 12 HOURS
Qty: 0 | Refills: 0 | Status: DISCONTINUED | OUTPATIENT
Start: 2018-08-26 | End: 2018-08-27

## 2018-08-26 RX ORDER — SACCHAROMYCES BOULARDII 250 MG
250 POWDER IN PACKET (EA) ORAL
Qty: 0 | Refills: 0 | Status: DISCONTINUED | OUTPATIENT
Start: 2018-08-26 | End: 2018-08-30

## 2018-08-26 RX ADMIN — ATORVASTATIN CALCIUM 40 MILLIGRAM(S): 80 TABLET, FILM COATED ORAL at 23:07

## 2018-08-26 RX ADMIN — OXYCODONE HYDROCHLORIDE 5 MILLIGRAM(S): 5 TABLET ORAL at 20:59

## 2018-08-26 RX ADMIN — ENOXAPARIN SODIUM 40 MILLIGRAM(S): 100 INJECTION SUBCUTANEOUS at 14:23

## 2018-08-26 RX ADMIN — Medication 250 MILLIGRAM(S): at 17:42

## 2018-08-26 RX ADMIN — Medication 325 MILLIGRAM(S): at 07:26

## 2018-08-26 RX ADMIN — Medication 1 MILLIGRAM(S): at 11:53

## 2018-08-26 RX ADMIN — Medication 325 MILLIGRAM(S): at 17:42

## 2018-08-26 RX ADMIN — Medication 2: at 11:54

## 2018-08-26 RX ADMIN — OXYCODONE HYDROCHLORIDE 5 MILLIGRAM(S): 5 TABLET ORAL at 21:52

## 2018-08-26 RX ADMIN — PIPERACILLIN AND TAZOBACTAM 25 GRAM(S): 4; .5 INJECTION, POWDER, LYOPHILIZED, FOR SOLUTION INTRAVENOUS at 14:29

## 2018-08-26 RX ADMIN — LOSARTAN POTASSIUM 50 MILLIGRAM(S): 100 TABLET, FILM COATED ORAL at 07:23

## 2018-08-26 RX ADMIN — Medication 650 MILLIGRAM(S): at 16:41

## 2018-08-26 NOTE — PROGRESS NOTE ADULT - PROBLEM SELECTOR PLAN 1
-appreciate orthopedics f/u  -cont brace  -cont pain meds prn  -leukocytosis is most likely stress related, trending down after IVF

## 2018-08-27 DIAGNOSIS — Z71.89 OTHER SPECIFIED COUNSELING: ICD-10-CM

## 2018-08-27 LAB
ANION GAP SERPL CALC-SCNC: 12 MMOL/L — SIGNIFICANT CHANGE UP (ref 5–17)
BUN SERPL-MCNC: 28 MG/DL — HIGH (ref 8–20)
CALCIUM SERPL-MCNC: 9 MG/DL — SIGNIFICANT CHANGE UP (ref 8.6–10.2)
CHLORIDE SERPL-SCNC: 97 MMOL/L — LOW (ref 98–107)
CO2 SERPL-SCNC: 29 MMOL/L — SIGNIFICANT CHANGE UP (ref 22–29)
CREAT SERPL-MCNC: 1.17 MG/DL — SIGNIFICANT CHANGE UP (ref 0.5–1.3)
CRP SERPL-MCNC: 23.6 MG/DL — HIGH (ref 0–0.4)
CULTURE RESULTS: SIGNIFICANT CHANGE UP
GLUCOSE BLDC GLUCOMTR-MCNC: 159 MG/DL — HIGH (ref 70–99)
GLUCOSE BLDC GLUCOMTR-MCNC: 159 MG/DL — HIGH (ref 70–99)
GLUCOSE BLDC GLUCOMTR-MCNC: 162 MG/DL — HIGH (ref 70–99)
GLUCOSE BLDC GLUCOMTR-MCNC: 216 MG/DL — HIGH (ref 70–99)
GLUCOSE BLDC GLUCOMTR-MCNC: 253 MG/DL — HIGH (ref 70–99)
GLUCOSE SERPL-MCNC: 165 MG/DL — HIGH (ref 70–115)
HCT VFR BLD CALC: 31.4 % — LOW (ref 42–52)
HGB BLD-MCNC: 9.7 G/DL — LOW (ref 14–18)
MAGNESIUM SERPL-MCNC: 2.6 MG/DL — SIGNIFICANT CHANGE UP (ref 1.6–2.6)
MCHC RBC-ENTMCNC: 28.4 PG — SIGNIFICANT CHANGE UP (ref 27–31)
MCHC RBC-ENTMCNC: 30.9 G/DL — LOW (ref 32–36)
MCV RBC AUTO: 91.8 FL — SIGNIFICANT CHANGE UP (ref 80–94)
PHOSPHATE SERPL-MCNC: 3.2 MG/DL — SIGNIFICANT CHANGE UP (ref 2.4–4.7)
PLATELET # BLD AUTO: 217 K/UL — SIGNIFICANT CHANGE UP (ref 150–400)
POTASSIUM SERPL-MCNC: 3.3 MMOL/L — LOW (ref 3.5–5.3)
POTASSIUM SERPL-SCNC: 3.3 MMOL/L — LOW (ref 3.5–5.3)
PROCALCITONIN SERPL-MCNC: 0.99 NG/ML — HIGH (ref 0–0.04)
RBC # BLD: 3.42 M/UL — LOW (ref 4.6–6.2)
RBC # FLD: 16.7 % — HIGH (ref 11–15.6)
SODIUM SERPL-SCNC: 138 MMOL/L — SIGNIFICANT CHANGE UP (ref 135–145)
SPECIMEN SOURCE: SIGNIFICANT CHANGE UP
WBC # BLD: 11.9 K/UL — HIGH (ref 4.8–10.8)
WBC # FLD AUTO: 11.9 K/UL — HIGH (ref 4.8–10.8)

## 2018-08-27 PROCEDURE — 99233 SBSQ HOSP IP/OBS HIGH 50: CPT

## 2018-08-27 RX ORDER — SIMETHICONE 80 MG/1
80 TABLET, CHEWABLE ORAL
Qty: 0 | Refills: 0 | Status: DISCONTINUED | OUTPATIENT
Start: 2018-08-27 | End: 2018-08-30

## 2018-08-27 RX ORDER — PIPERACILLIN AND TAZOBACTAM 4; .5 G/20ML; G/20ML
4.5 INJECTION, POWDER, LYOPHILIZED, FOR SOLUTION INTRAVENOUS EVERY 8 HOURS
Qty: 0 | Refills: 0 | Status: DISCONTINUED | OUTPATIENT
Start: 2018-08-27 | End: 2018-08-28

## 2018-08-27 RX ORDER — POTASSIUM CHLORIDE 20 MEQ
40 PACKET (EA) ORAL EVERY 4 HOURS
Qty: 0 | Refills: 0 | Status: COMPLETED | OUTPATIENT
Start: 2018-08-27 | End: 2018-08-27

## 2018-08-27 RX ORDER — VANCOMYCIN HCL 1 G
1000 VIAL (EA) INTRAVENOUS EVERY 12 HOURS
Qty: 0 | Refills: 0 | Status: DISCONTINUED | OUTPATIENT
Start: 2018-08-27 | End: 2018-08-29

## 2018-08-27 RX ORDER — SODIUM CHLORIDE 9 MG/ML
1000 INJECTION, SOLUTION INTRAVENOUS
Qty: 0 | Refills: 0 | Status: DISCONTINUED | OUTPATIENT
Start: 2018-08-27 | End: 2018-08-28

## 2018-08-27 RX ADMIN — Medication 40 MILLIEQUIVALENT(S): at 12:33

## 2018-08-27 RX ADMIN — SIMETHICONE 80 MILLIGRAM(S): 80 TABLET, CHEWABLE ORAL at 12:13

## 2018-08-27 RX ADMIN — ENOXAPARIN SODIUM 40 MILLIGRAM(S): 100 INJECTION SUBCUTANEOUS at 14:14

## 2018-08-27 RX ADMIN — OXYCODONE HYDROCHLORIDE 5 MILLIGRAM(S): 5 TABLET ORAL at 06:32

## 2018-08-27 RX ADMIN — OXYCODONE HYDROCHLORIDE 5 MILLIGRAM(S): 5 TABLET ORAL at 12:34

## 2018-08-27 RX ADMIN — PIPERACILLIN AND TAZOBACTAM 25 GRAM(S): 4; .5 INJECTION, POWDER, LYOPHILIZED, FOR SOLUTION INTRAVENOUS at 21:53

## 2018-08-27 RX ADMIN — LOSARTAN POTASSIUM 50 MILLIGRAM(S): 100 TABLET, FILM COATED ORAL at 05:20

## 2018-08-27 RX ADMIN — Medication 325 MILLIGRAM(S): at 05:21

## 2018-08-27 RX ADMIN — Medication 4: at 12:34

## 2018-08-27 RX ADMIN — Medication 1 MILLIGRAM(S): at 12:33

## 2018-08-27 RX ADMIN — PIPERACILLIN AND TAZOBACTAM 25 GRAM(S): 4; .5 INJECTION, POWDER, LYOPHILIZED, FOR SOLUTION INTRAVENOUS at 05:20

## 2018-08-27 RX ADMIN — OXYCODONE HYDROCHLORIDE 5 MILLIGRAM(S): 5 TABLET ORAL at 14:23

## 2018-08-27 RX ADMIN — Medication 2: at 18:15

## 2018-08-27 RX ADMIN — PIPERACILLIN AND TAZOBACTAM 25 GRAM(S): 4; .5 INJECTION, POWDER, LYOPHILIZED, FOR SOLUTION INTRAVENOUS at 14:14

## 2018-08-27 RX ADMIN — SODIUM CHLORIDE 75 MILLILITER(S): 9 INJECTION, SOLUTION INTRAVENOUS at 19:43

## 2018-08-27 RX ADMIN — Medication 250 MILLIGRAM(S): at 18:23

## 2018-08-27 RX ADMIN — Medication 250 MILLIGRAM(S): at 05:13

## 2018-08-27 RX ADMIN — OXYCODONE HYDROCHLORIDE 5 MILLIGRAM(S): 5 TABLET ORAL at 07:00

## 2018-08-27 RX ADMIN — Medication 250 MILLIGRAM(S): at 05:20

## 2018-08-27 RX ADMIN — ATORVASTATIN CALCIUM 40 MILLIGRAM(S): 80 TABLET, FILM COATED ORAL at 21:53

## 2018-08-27 RX ADMIN — Medication 325 MILLIGRAM(S): at 18:19

## 2018-08-27 RX ADMIN — SIMETHICONE 80 MILLIGRAM(S): 80 TABLET, CHEWABLE ORAL at 18:19

## 2018-08-27 RX ADMIN — Medication 250 MILLIGRAM(S): at 18:19

## 2018-08-27 RX ADMIN — Medication 40 MILLIEQUIVALENT(S): at 18:18

## 2018-08-27 RX ADMIN — Medication 2: at 08:07

## 2018-08-28 LAB
ANION GAP SERPL CALC-SCNC: 14 MMOL/L — SIGNIFICANT CHANGE UP (ref 5–17)
BUN SERPL-MCNC: 25 MG/DL — HIGH (ref 8–20)
CALCIUM SERPL-MCNC: 9.1 MG/DL — SIGNIFICANT CHANGE UP (ref 8.6–10.2)
CHLORIDE SERPL-SCNC: 98 MMOL/L — SIGNIFICANT CHANGE UP (ref 98–107)
CO2 SERPL-SCNC: 28 MMOL/L — SIGNIFICANT CHANGE UP (ref 22–29)
CREAT SERPL-MCNC: 1.27 MG/DL — SIGNIFICANT CHANGE UP (ref 0.5–1.3)
GLUCOSE BLDC GLUCOMTR-MCNC: 135 MG/DL — HIGH (ref 70–99)
GLUCOSE BLDC GLUCOMTR-MCNC: 140 MG/DL — HIGH (ref 70–99)
GLUCOSE BLDC GLUCOMTR-MCNC: 147 MG/DL — HIGH (ref 70–99)
GLUCOSE BLDC GLUCOMTR-MCNC: 157 MG/DL — HIGH (ref 70–99)
GLUCOSE SERPL-MCNC: 142 MG/DL — HIGH (ref 70–115)
HCT VFR BLD CALC: 29.5 % — LOW (ref 42–52)
HGB BLD-MCNC: 9.3 G/DL — LOW (ref 14–18)
MAGNESIUM SERPL-MCNC: 2.5 MG/DL — SIGNIFICANT CHANGE UP (ref 1.6–2.6)
MCHC RBC-ENTMCNC: 28.7 PG — SIGNIFICANT CHANGE UP (ref 27–31)
MCHC RBC-ENTMCNC: 31.5 G/DL — LOW (ref 32–36)
MCV RBC AUTO: 91 FL — SIGNIFICANT CHANGE UP (ref 80–94)
PHOSPHATE SERPL-MCNC: 2.6 MG/DL — SIGNIFICANT CHANGE UP (ref 2.4–4.7)
PLATELET # BLD AUTO: 248 K/UL — SIGNIFICANT CHANGE UP (ref 150–400)
POTASSIUM SERPL-MCNC: 3.6 MMOL/L — SIGNIFICANT CHANGE UP (ref 3.5–5.3)
POTASSIUM SERPL-SCNC: 3.6 MMOL/L — SIGNIFICANT CHANGE UP (ref 3.5–5.3)
PROCALCITONIN SERPL-MCNC: 1.06 NG/ML — HIGH (ref 0–0.04)
RBC # BLD: 3.24 M/UL — LOW (ref 4.6–6.2)
RBC # FLD: 16.6 % — HIGH (ref 11–15.6)
SODIUM SERPL-SCNC: 140 MMOL/L — SIGNIFICANT CHANGE UP (ref 135–145)
VANCOMYCIN TROUGH SERPL-MCNC: 14.8 UG/ML — SIGNIFICANT CHANGE UP (ref 10–20)
WBC # BLD: 9.6 K/UL — SIGNIFICANT CHANGE UP (ref 4.8–10.8)
WBC # FLD AUTO: 9.6 K/UL — SIGNIFICANT CHANGE UP (ref 4.8–10.8)

## 2018-08-28 PROCEDURE — 99233 SBSQ HOSP IP/OBS HIGH 50: CPT

## 2018-08-28 RX ORDER — ASPIRIN/CALCIUM CARB/MAGNESIUM 324 MG
325 TABLET ORAL DAILY
Qty: 0 | Refills: 0 | Status: DISCONTINUED | OUTPATIENT
Start: 2018-08-29 | End: 2018-08-30

## 2018-08-28 RX ORDER — POTASSIUM CHLORIDE 20 MEQ
40 PACKET (EA) ORAL ONCE
Qty: 0 | Refills: 0 | Status: COMPLETED | OUTPATIENT
Start: 2018-08-28 | End: 2018-08-28

## 2018-08-28 RX ORDER — PANTOPRAZOLE SODIUM 20 MG/1
40 TABLET, DELAYED RELEASE ORAL
Qty: 0 | Refills: 0 | Status: DISCONTINUED | OUTPATIENT
Start: 2018-08-28 | End: 2018-08-30

## 2018-08-28 RX ORDER — PIPERACILLIN AND TAZOBACTAM 4; .5 G/20ML; G/20ML
3.38 INJECTION, POWDER, LYOPHILIZED, FOR SOLUTION INTRAVENOUS EVERY 8 HOURS
Qty: 0 | Refills: 0 | Status: DISCONTINUED | OUTPATIENT
Start: 2018-08-28 | End: 2018-08-29

## 2018-08-28 RX ADMIN — SIMETHICONE 80 MILLIGRAM(S): 80 TABLET, CHEWABLE ORAL at 01:08

## 2018-08-28 RX ADMIN — Medication 250 MILLIGRAM(S): at 05:31

## 2018-08-28 RX ADMIN — SIMETHICONE 80 MILLIGRAM(S): 80 TABLET, CHEWABLE ORAL at 12:19

## 2018-08-28 RX ADMIN — PIPERACILLIN AND TAZOBACTAM 25 GRAM(S): 4; .5 INJECTION, POWDER, LYOPHILIZED, FOR SOLUTION INTRAVENOUS at 21:20

## 2018-08-28 RX ADMIN — ATORVASTATIN CALCIUM 40 MILLIGRAM(S): 80 TABLET, FILM COATED ORAL at 21:18

## 2018-08-28 RX ADMIN — Medication 650 MILLIGRAM(S): at 05:32

## 2018-08-28 RX ADMIN — Medication 250 MILLIGRAM(S): at 18:10

## 2018-08-28 RX ADMIN — ENOXAPARIN SODIUM 40 MILLIGRAM(S): 100 INJECTION SUBCUTANEOUS at 14:57

## 2018-08-28 RX ADMIN — Medication 40 MILLIEQUIVALENT(S): at 12:19

## 2018-08-28 RX ADMIN — PIPERACILLIN AND TAZOBACTAM 25 GRAM(S): 4; .5 INJECTION, POWDER, LYOPHILIZED, FOR SOLUTION INTRAVENOUS at 05:31

## 2018-08-28 RX ADMIN — SIMETHICONE 80 MILLIGRAM(S): 80 TABLET, CHEWABLE ORAL at 18:10

## 2018-08-28 RX ADMIN — Medication 325 MILLIGRAM(S): at 05:32

## 2018-08-28 RX ADMIN — Medication 250 MILLIGRAM(S): at 05:32

## 2018-08-28 RX ADMIN — Medication 2: at 08:17

## 2018-08-28 RX ADMIN — SIMETHICONE 80 MILLIGRAM(S): 80 TABLET, CHEWABLE ORAL at 05:31

## 2018-08-28 RX ADMIN — Medication 1 MILLIGRAM(S): at 12:19

## 2018-08-28 NOTE — PROGRESS NOTE ADULT - PROBLEM SELECTOR PLAN 1
-appreciate orthopedics f/u  -cont brace  -cont pain meds prn  -leukocytosis is most likely stress related, trending down after IVF and has now resolved

## 2018-08-29 LAB
ANION GAP SERPL CALC-SCNC: 13 MMOL/L — SIGNIFICANT CHANGE UP (ref 5–17)
BUN SERPL-MCNC: 25 MG/DL — HIGH (ref 8–20)
CALCIUM SERPL-MCNC: 8.9 MG/DL — SIGNIFICANT CHANGE UP (ref 8.6–10.2)
CHLORIDE SERPL-SCNC: 98 MMOL/L — SIGNIFICANT CHANGE UP (ref 98–107)
CO2 SERPL-SCNC: 26 MMOL/L — SIGNIFICANT CHANGE UP (ref 22–29)
CREAT SERPL-MCNC: 1.37 MG/DL — HIGH (ref 0.5–1.3)
CRP SERPL-MCNC: 16.5 MG/DL — HIGH (ref 0–0.4)
GLUCOSE BLDC GLUCOMTR-MCNC: 127 MG/DL — HIGH (ref 70–99)
GLUCOSE BLDC GLUCOMTR-MCNC: 146 MG/DL — HIGH (ref 70–99)
GLUCOSE BLDC GLUCOMTR-MCNC: 147 MG/DL — HIGH (ref 70–99)
GLUCOSE SERPL-MCNC: 147 MG/DL — HIGH (ref 70–115)
HCT VFR BLD CALC: 29.4 % — LOW (ref 42–52)
HGB BLD-MCNC: 9.2 G/DL — LOW (ref 14–18)
MAGNESIUM SERPL-MCNC: 2.5 MG/DL — SIGNIFICANT CHANGE UP (ref 1.6–2.6)
MCHC RBC-ENTMCNC: 28.4 PG — SIGNIFICANT CHANGE UP (ref 27–31)
MCHC RBC-ENTMCNC: 31.3 G/DL — LOW (ref 32–36)
MCV RBC AUTO: 90.7 FL — SIGNIFICANT CHANGE UP (ref 80–94)
PHOSPHATE SERPL-MCNC: 3.4 MG/DL — SIGNIFICANT CHANGE UP (ref 2.4–4.7)
PLATELET # BLD AUTO: 267 K/UL — SIGNIFICANT CHANGE UP (ref 150–400)
POTASSIUM SERPL-MCNC: 3.4 MMOL/L — LOW (ref 3.5–5.3)
POTASSIUM SERPL-SCNC: 3.4 MMOL/L — LOW (ref 3.5–5.3)
RBC # BLD: 3.24 M/UL — LOW (ref 4.6–6.2)
RBC # FLD: 16.9 % — HIGH (ref 11–15.6)
SODIUM SERPL-SCNC: 137 MMOL/L — SIGNIFICANT CHANGE UP (ref 135–145)
WBC # BLD: 9.5 K/UL — SIGNIFICANT CHANGE UP (ref 4.8–10.8)
WBC # FLD AUTO: 9.5 K/UL — SIGNIFICANT CHANGE UP (ref 4.8–10.8)

## 2018-08-29 PROCEDURE — 99232 SBSQ HOSP IP/OBS MODERATE 35: CPT

## 2018-08-29 PROCEDURE — 74176 CT ABD & PELVIS W/O CONTRAST: CPT | Mod: 26

## 2018-08-29 RX ADMIN — SIMETHICONE 80 MILLIGRAM(S): 80 TABLET, CHEWABLE ORAL at 13:50

## 2018-08-29 RX ADMIN — SIMETHICONE 80 MILLIGRAM(S): 80 TABLET, CHEWABLE ORAL at 23:34

## 2018-08-29 RX ADMIN — PANTOPRAZOLE SODIUM 40 MILLIGRAM(S): 20 TABLET, DELAYED RELEASE ORAL at 05:06

## 2018-08-29 RX ADMIN — SIMETHICONE 80 MILLIGRAM(S): 80 TABLET, CHEWABLE ORAL at 00:42

## 2018-08-29 RX ADMIN — Medication 325 MILLIGRAM(S): at 11:38

## 2018-08-29 RX ADMIN — PIPERACILLIN AND TAZOBACTAM 25 GRAM(S): 4; .5 INJECTION, POWDER, LYOPHILIZED, FOR SOLUTION INTRAVENOUS at 06:10

## 2018-08-29 RX ADMIN — SIMETHICONE 80 MILLIGRAM(S): 80 TABLET, CHEWABLE ORAL at 05:06

## 2018-08-29 RX ADMIN — ENOXAPARIN SODIUM 40 MILLIGRAM(S): 100 INJECTION SUBCUTANEOUS at 13:56

## 2018-08-29 RX ADMIN — LOSARTAN POTASSIUM 50 MILLIGRAM(S): 100 TABLET, FILM COATED ORAL at 05:06

## 2018-08-29 RX ADMIN — Medication 1 MILLIGRAM(S): at 11:38

## 2018-08-29 RX ADMIN — Medication 250 MILLIGRAM(S): at 16:40

## 2018-08-29 RX ADMIN — SIMETHICONE 80 MILLIGRAM(S): 80 TABLET, CHEWABLE ORAL at 16:40

## 2018-08-29 RX ADMIN — ATORVASTATIN CALCIUM 40 MILLIGRAM(S): 80 TABLET, FILM COATED ORAL at 23:34

## 2018-08-29 RX ADMIN — Medication 250 MILLIGRAM(S): at 05:06

## 2018-08-29 RX ADMIN — Medication 250 MILLIGRAM(S): at 05:04

## 2018-08-30 VITALS
RESPIRATION RATE: 18 BRPM | TEMPERATURE: 99 F | DIASTOLIC BLOOD PRESSURE: 68 MMHG | SYSTOLIC BLOOD PRESSURE: 137 MMHG | HEART RATE: 79 BPM | OXYGEN SATURATION: 95 %

## 2018-08-30 LAB
ANION GAP SERPL CALC-SCNC: 14 MMOL/L — SIGNIFICANT CHANGE UP (ref 5–17)
BUN SERPL-MCNC: 21 MG/DL — HIGH (ref 8–20)
CALCIUM SERPL-MCNC: 9.4 MG/DL — SIGNIFICANT CHANGE UP (ref 8.6–10.2)
CHLORIDE SERPL-SCNC: 99 MMOL/L — SIGNIFICANT CHANGE UP (ref 98–107)
CO2 SERPL-SCNC: 27 MMOL/L — SIGNIFICANT CHANGE UP (ref 22–29)
CREAT SERPL-MCNC: 1.22 MG/DL — SIGNIFICANT CHANGE UP (ref 0.5–1.3)
GLUCOSE BLDC GLUCOMTR-MCNC: 138 MG/DL — HIGH (ref 70–99)
GLUCOSE BLDC GLUCOMTR-MCNC: 157 MG/DL — HIGH (ref 70–99)
GLUCOSE BLDC GLUCOMTR-MCNC: 163 MG/DL — HIGH (ref 70–99)
GLUCOSE BLDC GLUCOMTR-MCNC: 192 MG/DL — HIGH (ref 70–99)
GLUCOSE SERPL-MCNC: 137 MG/DL — HIGH (ref 70–115)
HCT VFR BLD CALC: 31.2 % — LOW (ref 42–52)
HGB BLD-MCNC: 9.8 G/DL — LOW (ref 14–18)
MAGNESIUM SERPL-MCNC: 2.5 MG/DL — SIGNIFICANT CHANGE UP (ref 1.6–2.6)
MCHC RBC-ENTMCNC: 28.4 PG — SIGNIFICANT CHANGE UP (ref 27–31)
MCHC RBC-ENTMCNC: 31.4 G/DL — LOW (ref 32–36)
MCV RBC AUTO: 90.4 FL — SIGNIFICANT CHANGE UP (ref 80–94)
PHOSPHATE SERPL-MCNC: 3.2 MG/DL — SIGNIFICANT CHANGE UP (ref 2.4–4.7)
PLATELET # BLD AUTO: 307 K/UL — SIGNIFICANT CHANGE UP (ref 150–400)
POTASSIUM SERPL-MCNC: 3.6 MMOL/L — SIGNIFICANT CHANGE UP (ref 3.5–5.3)
POTASSIUM SERPL-SCNC: 3.6 MMOL/L — SIGNIFICANT CHANGE UP (ref 3.5–5.3)
RBC # BLD: 3.45 M/UL — LOW (ref 4.6–6.2)
RBC # FLD: 16.6 % — HIGH (ref 11–15.6)
SODIUM SERPL-SCNC: 140 MMOL/L — SIGNIFICANT CHANGE UP (ref 135–145)
WBC # BLD: 11 K/UL — HIGH (ref 4.8–10.8)
WBC # FLD AUTO: 11 K/UL — HIGH (ref 4.8–10.8)

## 2018-08-30 PROCEDURE — 81001 URINALYSIS AUTO W/SCOPE: CPT

## 2018-08-30 PROCEDURE — 87040 BLOOD CULTURE FOR BACTERIA: CPT

## 2018-08-30 PROCEDURE — 36415 COLL VENOUS BLD VENIPUNCTURE: CPT

## 2018-08-30 PROCEDURE — 83036 HEMOGLOBIN GLYCOSYLATED A1C: CPT

## 2018-08-30 PROCEDURE — 97163 PT EVAL HIGH COMPLEX 45 MIN: CPT

## 2018-08-30 PROCEDURE — 84100 ASSAY OF PHOSPHORUS: CPT

## 2018-08-30 PROCEDURE — 85730 THROMBOPLASTIN TIME PARTIAL: CPT

## 2018-08-30 PROCEDURE — T1013: CPT

## 2018-08-30 PROCEDURE — 87086 URINE CULTURE/COLONY COUNT: CPT

## 2018-08-30 PROCEDURE — 85027 COMPLETE CBC AUTOMATED: CPT

## 2018-08-30 PROCEDURE — 97530 THERAPEUTIC ACTIVITIES: CPT

## 2018-08-30 PROCEDURE — 97116 GAIT TRAINING THERAPY: CPT

## 2018-08-30 PROCEDURE — 97110 THERAPEUTIC EXERCISES: CPT

## 2018-08-30 PROCEDURE — 80053 COMPREHEN METABOLIC PANEL: CPT

## 2018-08-30 PROCEDURE — 85610 PROTHROMBIN TIME: CPT

## 2018-08-30 PROCEDURE — 80202 ASSAY OF VANCOMYCIN: CPT

## 2018-08-30 PROCEDURE — 72125 CT NECK SPINE W/O DYE: CPT

## 2018-08-30 PROCEDURE — 83735 ASSAY OF MAGNESIUM: CPT

## 2018-08-30 PROCEDURE — 86140 C-REACTIVE PROTEIN: CPT

## 2018-08-30 PROCEDURE — 74176 CT ABD & PELVIS W/O CONTRAST: CPT

## 2018-08-30 PROCEDURE — 70450 CT HEAD/BRAIN W/O DYE: CPT

## 2018-08-30 PROCEDURE — 96374 THER/PROPH/DIAG INJ IV PUSH: CPT

## 2018-08-30 PROCEDURE — 82962 GLUCOSE BLOOD TEST: CPT

## 2018-08-30 PROCEDURE — 80048 BASIC METABOLIC PNL TOTAL CA: CPT

## 2018-08-30 PROCEDURE — 73700 CT LOWER EXTREMITY W/O DYE: CPT

## 2018-08-30 PROCEDURE — 84145 PROCALCITONIN (PCT): CPT

## 2018-08-30 PROCEDURE — 71045 X-RAY EXAM CHEST 1 VIEW: CPT

## 2018-08-30 PROCEDURE — 99239 HOSP IP/OBS DSCHRG MGMT >30: CPT

## 2018-08-30 PROCEDURE — 73560 X-RAY EXAM OF KNEE 1 OR 2: CPT

## 2018-08-30 PROCEDURE — 99285 EMERGENCY DEPT VISIT HI MDM: CPT | Mod: 25

## 2018-08-30 PROCEDURE — 93970 EXTREMITY STUDY: CPT

## 2018-08-30 RX ORDER — FUROSEMIDE 40 MG
1 TABLET ORAL
Qty: 0 | Refills: 0 | COMMUNITY

## 2018-08-30 RX ORDER — PANTOPRAZOLE SODIUM 20 MG/1
1 TABLET, DELAYED RELEASE ORAL
Qty: 0 | Refills: 0 | COMMUNITY
Start: 2018-08-30

## 2018-08-30 RX ORDER — POTASSIUM CHLORIDE 20 MEQ
1 PACKET (EA) ORAL
Qty: 0 | Refills: 0 | COMMUNITY

## 2018-08-30 RX ORDER — LOSARTAN POTASSIUM 100 MG/1
1 TABLET, FILM COATED ORAL
Qty: 0 | Refills: 0 | COMMUNITY
Start: 2018-08-30

## 2018-08-30 RX ORDER — METFORMIN HYDROCHLORIDE 850 MG/1
1000 TABLET ORAL
Qty: 0 | Refills: 0 | Status: DISCONTINUED | OUTPATIENT
Start: 2018-08-30 | End: 2018-08-30

## 2018-08-30 RX ORDER — SIMETHICONE 80 MG/1
1 TABLET, CHEWABLE ORAL
Qty: 0 | Refills: 0 | COMMUNITY
Start: 2018-08-30

## 2018-08-30 RX ORDER — ACETAMINOPHEN 500 MG
2 TABLET ORAL
Qty: 0 | Refills: 0 | COMMUNITY
Start: 2018-08-30

## 2018-08-30 RX ORDER — SACCHAROMYCES BOULARDII 250 MG
1 POWDER IN PACKET (EA) ORAL
Qty: 0 | Refills: 0 | COMMUNITY
Start: 2018-08-30

## 2018-08-30 RX ORDER — TELMISARTAN 20 MG/1
1 TABLET ORAL
Qty: 0 | Refills: 0 | COMMUNITY

## 2018-08-30 RX ORDER — ASPIRIN/CALCIUM CARB/MAGNESIUM 324 MG
1 TABLET ORAL
Qty: 0 | Refills: 0 | COMMUNITY
Start: 2018-08-30

## 2018-08-30 RX ORDER — ENOXAPARIN SODIUM 100 MG/ML
40 INJECTION SUBCUTANEOUS
Qty: 0 | Refills: 0 | COMMUNITY
Start: 2018-08-30

## 2018-08-30 RX ORDER — GLIMEPIRIDE 1 MG
1 TABLET ORAL
Qty: 0 | Refills: 0 | COMMUNITY

## 2018-08-30 RX ADMIN — Medication 250 MILLIGRAM(S): at 05:21

## 2018-08-30 RX ADMIN — Medication 325 MILLIGRAM(S): at 12:30

## 2018-08-30 RX ADMIN — Medication 2: at 16:44

## 2018-08-30 RX ADMIN — Medication 1 MILLIGRAM(S): at 12:30

## 2018-08-30 RX ADMIN — LOSARTAN POTASSIUM 50 MILLIGRAM(S): 100 TABLET, FILM COATED ORAL at 05:21

## 2018-08-30 RX ADMIN — Medication 2: at 12:33

## 2018-08-30 RX ADMIN — ENOXAPARIN SODIUM 40 MILLIGRAM(S): 100 INJECTION SUBCUTANEOUS at 14:25

## 2018-08-30 RX ADMIN — METFORMIN HYDROCHLORIDE 1000 MILLIGRAM(S): 850 TABLET ORAL at 14:25

## 2018-08-30 RX ADMIN — SIMETHICONE 80 MILLIGRAM(S): 80 TABLET, CHEWABLE ORAL at 12:34

## 2018-08-30 RX ADMIN — OXYCODONE HYDROCHLORIDE 5 MILLIGRAM(S): 5 TABLET ORAL at 05:21

## 2018-08-30 RX ADMIN — SIMETHICONE 80 MILLIGRAM(S): 80 TABLET, CHEWABLE ORAL at 05:21

## 2018-08-30 RX ADMIN — PANTOPRAZOLE SODIUM 40 MILLIGRAM(S): 20 TABLET, DELAYED RELEASE ORAL at 05:21

## 2018-08-30 RX ADMIN — OXYCODONE HYDROCHLORIDE 5 MILLIGRAM(S): 5 TABLET ORAL at 06:20

## 2018-08-30 NOTE — DIETITIAN INITIAL EVALUATION ADULT. - PERTINENT LABORATORY DATA
08-30 Na140 mmol/L Glu 137 mg/dL<H> K+ 3.6 mmol/L Cr  1.22 mg/dL BUN 21.0 mg/dL<H> Phos 3.2 mg/dL Alb n/a   PAB n/a

## 2018-08-30 NOTE — PROGRESS NOTE ADULT - PROBLEM SELECTOR PROBLEM 2
Diabetes mellitus of other type with complication

## 2018-08-30 NOTE — PROGRESS NOTE ADULT - PROBLEM SELECTOR PROBLEM 1
Other closed fracture of proximal end of left tibia, initial encounter

## 2018-08-30 NOTE — PROGRESS NOTE ADULT - PROBLEM SELECTOR PLAN 2
-no metformin in setting of chronic kidney disease  -A1C is 7  -cont raiss
-now restart metformin bid  -A1C is 7  -cont raiss

## 2018-08-30 NOTE — PROGRESS NOTE ADULT - PROBLEM SELECTOR PLAN 4
-he's having low grade fevers, and am concerned about atelectasis and hospital acquired pneumonia  -blood cultures pending  -vanc/zosyn (day 4), d/c on po levaquin tomm  -u/a and urine culture negative  -repeat labs tomm am  -CRP trended down
-he's having low grade fevers, and am concerned about atelectasis and hospital acquired pneumonia  -blood cultures pending  -vanc/zosyn (day 4), start po levaquin tomm  -u/a and urine culture negative  -repeat labs tomm am  -CRP trended down
-he's having low grade fevers, and am concerned about atelectasis and hospital acquired pneumonia  -send blood cultures  -send u/a and urine culture  -send sputum culture  -repeat labs tomm am
-he's having low grade fevers, and am concerned about atelectasis and hospital acquired pneumonia  -sent blood cultures  -vanc/zosyn (day 2/5)  -send u/a and urine culture  -send sputum culture  -repeat labs tomm am
-he's having low grade fevers, and am concerned about atelectasis and hospital acquired pneumonia  -send blood cultures  -send u/a and urine culture  -send sputum culture  -repeat labs tomm am

## 2018-08-30 NOTE — PROGRESS NOTE ADULT - SUBJECTIVE AND OBJECTIVE BOX
is a 67 y/o male who presented with left sided leg pain after sustaining a fall earlier before on day of admission.     He has had a brace placed by orthopedics and most likely, will need to be d/cd to rehab on Monday. He had a mild low grade fever, he appears nontoxic. He nods his head "no" when I ask him about pain. There appear to be no obvious signs of infection. We will continue to monitor and if his fevers continue, we can do an infectious work up.    His labs are concerning for infection, but his CRP and procalcitonin may also be due to trauma and setting the brace on the leg. I've ordered a u/a and urine culture to r/o infection and he has no signs of cough, although I've ordered a CXR.    Summary:   REVIEW OF SYSTEMS    General:	"I'm comfortable."    Skin/Breast:  	  Ophthalmologic:  	  ENMT:	    Respiratory and Thorax:  	  Cardiovascular:	    Gastrointestinal:	    Genitourinary:	    Musculoskeletal:	    Neurological:	    Psychiatric:	    Hematology/Lymphatics:	    Endocrine:	    Allergic/Immunologic:	    Vital Signs Last 24 Hrs  T(C): 37 (28 Aug 2018 07:51), Max: 37.4 (28 Aug 2018 05:09)  T(F): 98.6 (28 Aug 2018 07:51), Max: 99.3 (28 Aug 2018 05:09)  HR: 78 (28 Aug 2018 07:51) (71 - 89)  BP: 116/55 (28 Aug 2018 07:51) (98/50 - 133/65)  BP(mean): --  RR: 18 (28 Aug 2018 07:51) (18 - 18)  SpO2: 99% (28 Aug 2018 07:51) (91% - 99%)                    General appearance: No acute distress, Awake, Alert, has mental deficiency, nontoxic, nonverbal but nods his head; sitting up in a chair today  HEENT: Normocephalic, Atraumatic, Conjunctiva clear, EOMI  Neck: Supple, No JVD, No tenderness  Lungs: Clear to auscultation, Breath sound equal bilaterally, +LLL crackles  Cardiovascular: S1S2, Regular rhythm  Abdomen: Soft, Nontender, Nondistended, No guarding/rebound, Positive bowel sounds  Extremities: No clubbing, No cyanosis, No calf tenderness, Bilateral lower extremity edema, Range of motion limited by pain  Neuro: Strength equal bilaterally, No tremors  Psychiatric: Appropriate mood, Normal affect                                 9.3    9.6   )-----------( 248      ( 28 Aug 2018 05:59 )             29.5     08-28    140  |  98  |  25.0<H>  ----------------------------<  142<H>  3.6   |  28.0  |  1.27    Ca    9.1      28 Aug 2018 05:59  Phos  2.6     08-28  Mg     2.5     08-28    Radiology:     MEDICATIONS  (STANDING):  aspirin enteric coated 325 milliGRAM(s) Oral two times a day  atorvastatin 40 milliGRAM(s) Oral at bedtime  dextrose 5%. 1000 milliLiter(s) (50 mL/Hr) IV Continuous <Continuous>  dextrose 50% Injectable 12.5 Gram(s) IV Push once  dextrose 50% Injectable 25 Gram(s) IV Push once  dextrose 50% Injectable 25 Gram(s) IV Push once  enoxaparin Injectable 40 milliGRAM(s) SubCutaneous every 24 hours  folic acid 1 milliGRAM(s) Oral daily  insulin lispro (HumaLOG) corrective regimen sliding scale   SubCutaneous three times a day before meals  lactated ringers. 1000 milliLiter(s) (75 mL/Hr) IV Continuous <Continuous>  losartan 50 milliGRAM(s) Oral daily  pantoprazole    Tablet 40 milliGRAM(s) Oral before breakfast  piperacillin/tazobactam IVPB. 4.5 Gram(s) IV Intermittent every 8 hours  saccharomyces boulardii 250 milliGRAM(s) Oral two times a day  simethicone 80 milliGRAM(s) Chew four times a day  vancomycin  IVPB 1000 milliGRAM(s) IV Intermittent every 12 hours    MEDICATIONS  (PRN):  acetaminophen   Tablet 650 milliGRAM(s) Oral every 6 hours PRN For Temp greater than 38 C (100.4 F)  aluminum hydroxide/magnesium hydroxide/simethicone Suspension 30 milliLiter(s) Oral every 6 hours PRN Dyspepsia  dextrose 40% Gel 15 Gram(s) Oral once PRN Blood Glucose LESS THAN 70 milliGRAM(s)/deciliter  glucagon  Injectable 1 milliGRAM(s) IntraMuscular once PRN Glucose LESS THAN 70 milligrams/deciliter  oxyCODONE    IR 5 milliGRAM(s) Oral every 6 hours PRN Moderate Pain (4 - 6)
 is a 67 y/o male who presented with left sided leg pain after sustaining a fall earlier before on day of admission.     He has had a brace placed by orthopedics and most likely, will need to be d/cd to rehab on Monday. He had a mild low grade fever, he appears nontoxic. He nods his head "no" when I ask him about pain. There appear to be no obvious signs of infection. We will continue to monitor and if his fevers continue, we can do an infectious work up.    His labs are concerning for infection, but his CRP and procalcitonin may also be due to trauma and setting the brace on the leg. I've ordered a u/a and urine culture to r/o infection and he has no signs of cough, although I've ordered a CXR.    Summary:   REVIEW OF SYSTEMS    General:	"I'm comfortable."    Skin/Breast:  	  Ophthalmologic:  	  ENMT:	    Respiratory and Thorax:  	  Cardiovascular:	    Gastrointestinal:	    Genitourinary:	    Musculoskeletal:	    Neurological:	    Psychiatric:	    Hematology/Lymphatics:	    Endocrine:	    Allergic/Immunologic:	  Vital Signs Last 24 Hrs  T(C): 37.6 (26 Aug 2018 07:22), Max: 37.7 (25 Aug 2018 15:18)  T(F): 99.7 (26 Aug 2018 07:22), Max: 99.9 (25 Aug 2018 15:18)  HR: 85 (26 Aug 2018 07:22) (85 - 92)  BP: 124/63 (26 Aug 2018 07:22) (124/63 - 136/63)  BP(mean): --  RR: 18 (26 Aug 2018 07:22) (18 - 20)  SpO2: 94% (26 Aug 2018 07:22) (94% - 95%)  General appearance: No acute distress, Awake, Alert, has mental deficiency, nontoxic, nonverbal but nods his head  HEENT: Normocephalic, Atraumatic, Conjunctiva clear, EOMI  Neck: Supple, No JVD, No tenderness  Lungs: Clear to auscultation, Breath sound equal bilaterally, No wheezes, No rales  Cardiovascular: S1S2, Regular rhythm  Abdomen: Soft, Nontender, Nondistended, No guarding/rebound, Positive bowel sounds  Extremities: No clubbing, No cyanosis, No calf tenderness, Bilateral lower extremity edema, Range of motion limited by pain  Neuro: Strength equal bilaterally, No tremors  Psychiatric: Appropriate mood, Normal affect                                 10.4   13.8  )-----------( 217      ( 26 Aug 2018 08:03 )             33.3     08-26    142  |  97<L>  |  28.0<H>  ----------------------------<  135<H>  3.5   |  30.0<H>  |  1.33<H>    Ca    9.4      26 Aug 2018 08:04  Phos  2.6     08-26  Mg     2.3     08-26    Radiology:     MEDICATIONS  (STANDING):  aspirin enteric coated 325 milliGRAM(s) Oral two times a day  atorvastatin 40 milliGRAM(s) Oral at bedtime  dextrose 5%. 1000 milliLiter(s) (50 mL/Hr) IV Continuous <Continuous>  dextrose 50% Injectable 12.5 Gram(s) IV Push once  dextrose 50% Injectable 25 Gram(s) IV Push once  dextrose 50% Injectable 25 Gram(s) IV Push once  enoxaparin Injectable 40 milliGRAM(s) SubCutaneous every 24 hours  folic acid 1 milliGRAM(s) Oral daily  insulin lispro (HumaLOG) corrective regimen sliding scale   SubCutaneous three times a day before meals  lactated ringers. 1000 milliLiter(s) (75 mL/Hr) IV Continuous <Continuous>  losartan 50 milliGRAM(s) Oral daily    MEDICATIONS  (PRN):  acetaminophen   Tablet 650 milliGRAM(s) Oral every 6 hours PRN For Temp greater than 38 C (100.4 F)  dextrose 40% Gel 15 Gram(s) Oral once PRN Blood Glucose LESS THAN 70 milliGRAM(s)/deciliter  glucagon  Injectable 1 milliGRAM(s) IntraMuscular once PRN Glucose LESS THAN 70 milligrams/deciliter  oxyCODONE    IR 5 milliGRAM(s) Oral every 6 hours PRN Moderate Pain (4 - 6)
 is a 69 y/o male who presented with left sided leg pain after sustaining a fall earlier before on day of admission.     He has been treated for a pneumonia, and he is doing much better.     He appears to be responding, today is day 4 of vanc and zosyn, and I'm going to continue him on levaquin. He can be d/cd to [facility] today.     Summary:   REVIEW OF SYSTEMS    General:	"I'm doing okay."    Skin/Breast:  	  Ophthalmologic:  	  ENMT:	    Respiratory and Thorax:  	  Cardiovascular:	    Gastrointestinal:	    Genitourinary:	    Musculoskeletal:	    Neurological:	    Psychiatric:	    Hematology/Lymphatics:	    Endocrine:	    Allergic/Immunologic:	    Vital Signs Last 24 Hrs  T(C): 37.1 (30 Aug 2018 15:25), Max: 37.1 (30 Aug 2018 00:10)  T(F): 98.8 (30 Aug 2018 15:25), Max: 98.8 (30 Aug 2018 15:25)  HR: 79 (30 Aug 2018 15:25) (79 - 83)  BP: 137/68 (30 Aug 2018 15:25) (137/68 - 155/75)  BP(mean): --  RR: 18 (30 Aug 2018 15:25) (18 - 18)  SpO2: 95% (30 Aug 2018 15:25) (94% - 95%)  General appearance: No acute distress, Awake, Alert, has mental deficiency, nontoxic, nonverbal but nods his head; sitting up in a chair today  HEENT: Normocephalic, Atraumatic, Conjunctiva clear, EOMI  Neck: Supple, No JVD, No tenderness  Lungs: Clear to auscultation, Breath sound equal bilaterally, +LLL crackles  Cardiovascular: S1S2, Regular rhythm  Abdomen: Soft, Nontender, Nondistended, No guarding/rebound, Positive bowel sounds  Extremities: No clubbing, No cyanosis, No calf tenderness, Bilateral lower extremity edema, Range of motion limited by pain  Neuro: Strength equal bilaterally, No tremors  Psychiatric: Appropriate mood, Normal affect    glucagon  Injectable 1 milliGRAM(s) IntraMuscular once PRN Glucose LESS THAN 70 milligrams/deciliter  oxyCODONE    IR 5 milliGRAM(s) Oral every 6 hours PRN Moderate Pain (4 - 6)                        9.8    11.0  )-----------( 307      ( 30 Aug 2018 08:13 )             31.2     08-30    140  |  99  |  21.0<H>  ----------------------------<  137<H>  3.6   |  27.0  |  1.22    Ca    9.4      30 Aug 2018 08:13  Phos  3.2     08-30  Mg     2.5     08-30    Radiology:     MEDICATIONS  (STANDING):  aspirin enteric coated 325 milliGRAM(s) Oral daily  atorvastatin 40 milliGRAM(s) Oral at bedtime  dextrose 5%. 1000 milliLiter(s) (50 mL/Hr) IV Continuous <Continuous>  dextrose 50% Injectable 12.5 Gram(s) IV Push once  dextrose 50% Injectable 25 Gram(s) IV Push once  dextrose 50% Injectable 25 Gram(s) IV Push once  enoxaparin Injectable 40 milliGRAM(s) SubCutaneous every 24 hours  folic acid 1 milliGRAM(s) Oral daily  insulin lispro (HumaLOG) corrective regimen sliding scale   SubCutaneous three times a day before meals  levoFLOXacin  Tablet 500 milliGRAM(s) Oral every 24 hours  losartan 50 milliGRAM(s) Oral daily  metFORMIN 1000 milliGRAM(s) Oral two times a day  pantoprazole    Tablet 40 milliGRAM(s) Oral before breakfast  saccharomyces boulardii 250 milliGRAM(s) Oral two times a day  simethicone 80 milliGRAM(s) Chew four times a day    MEDICATIONS  (PRN):  acetaminophen   Tablet 650 milliGRAM(s) Oral every 6 hours PRN For Temp greater than 38 C (100.4 F)  aluminum hydroxide/magnesium hydroxide/simethicone Suspension 30 milliLiter(s) Oral every 6 hours PRN Dyspepsia  dextrose 40% Gel 15 Gram(s) Oral once PRN Blood Glucose LESS THAN 70 milliGRAM(s)/deciliter  glucagon  Injectable 1 milliGRAM(s) IntraMuscular once PRN Glucose LESS THAN 70 milligrams/deciliter  oxyCODONE    IR 5 milliGRAM(s) Oral every 6 hours PRN Moderate Pain (4 - 6)
 is a 69 y/o male who presented with left sided leg pain after sustaining a fall earlier before on day of admission.     He has had a brace placed by orthopedics and most likely, will need to be d/cd to rehab on Monday. He had a mild low grade fever, he appears nontoxic. He nods his head "no" when I ask him about pain. There appear to be no obvious signs of infection. We will continue to monitor and if his fevers continue, we can do an infectious work up.    His labs are concerning for infection, but his CRP and procalcitonin may also be due to trauma and setting the brace on the leg. I've ordered a u/a and urine culture to r/o infection and he has no signs of cough, although I've ordered a CXR.    He appears to be responding, today is day 4 of paul and zosyn, and I'm going to continue him on levaquin starting tomorrow. His HCP Aminah wants him to undergo a CT abd/pelvis to check on his "hernia" as she's concerned that it's getting problematic. I've ordered a CT abd/pelvis for today.     Summary:   REVIEW OF SYSTEMS    General:	"I'm comfortable."    Skin/Breast:  	  Ophthalmologic:  	  ENMT:	    Respiratory and Thorax:  	  Cardiovascular:	    Gastrointestinal:	    Genitourinary:	    Musculoskeletal:	    Neurological:	    Psychiatric:	    Hematology/Lymphatics:	    Endocrine:	    Allergic/Immunologic:	    Vital Signs Last 24 Hrs                  General appearance: No acute distress, Awake, Alert, has mental deficiency, nontoxic, nonverbal but nods his head; sitting up in a chair today  HEENT: Normocephalic, Atraumatic, Conjunctiva clear, EOMI  Neck: Supple, No JVD, No tenderness  Lungs: Clear to auscultation, Breath sound equal bilaterally, +LLL crackles  Cardiovascular: S1S2, Regular rhythm  Abdomen: Soft, Nontender, Nondistended, No guarding/rebound, Positive bowel sounds  Extremities: No clubbing, No cyanosis, No calf tenderness, Bilateral lower extremity edema, Range of motion limited by pain  Neuro: Strength equal bilaterally, No tremors  Psychiatric: Appropriate mood, Normal affect                         9.2    9.5   )-----------( 267      ( 29 Aug 2018 08:12 )             29.4     08-29    137  |  98  |  25.0<H>  ----------------------------<  147<H>  3.4<L>   |  26.0  |  1.37<H>    Ca    8.9      29 Aug 2018 08:12  Phos  3.4     08-29  Mg     2.5     08-29    Radiology:     MEDICATIONS  (STANDING):  aspirin enteric coated 325 milliGRAM(s) Oral daily  atorvastatin 40 milliGRAM(s) Oral at bedtime  dextrose 5%. 1000 milliLiter(s) (50 mL/Hr) IV Continuous <Continuous>  dextrose 50% Injectable 12.5 Gram(s) IV Push once  dextrose 50% Injectable 25 Gram(s) IV Push once  dextrose 50% Injectable 25 Gram(s) IV Push once  enoxaparin Injectable 40 milliGRAM(s) SubCutaneous every 24 hours  folic acid 1 milliGRAM(s) Oral daily  insulin lispro (HumaLOG) corrective regimen sliding scale   SubCutaneous three times a day before meals  losartan 50 milliGRAM(s) Oral daily  pantoprazole    Tablet 40 milliGRAM(s) Oral before breakfast  saccharomyces boulardii 250 milliGRAM(s) Oral two times a day  simethicone 80 milliGRAM(s) Chew four times a day    MEDICATIONS  (PRN):  acetaminophen   Tablet 650 milliGRAM(s) Oral every 6 hours PRN For Temp greater than 38 C (100.4 F)  aluminum hydroxide/magnesium hydroxide/simethicone Suspension 30 milliLiter(s) Oral every 6 hours PRN Dyspepsia  dextrose 40% Gel 15 Gram(s) Oral once PRN Blood Glucose LESS THAN 70 milliGRAM(s)/deciliter  glucagon  Injectable 1 milliGRAM(s) IntraMuscular once PRN Glucose LESS THAN 70 milligrams/deciliter  oxyCODONE    IR 5 milliGRAM(s) Oral every 6 hours PRN Moderate Pain (4 - 6)
 is a 69 y/o male who presented with left sided leg pain after sustaining a fall earlier before on day of admission.     He has had a brace placed by orthopedics and most likely, will need to be d/cd to rehab on Monday. He had a mild low grade fever, he appears nontoxic. He nods his head "no" when I ask him about pain. There appear to be no obvious signs of infection. We will continue to monitor and if his fevers continue, we can do an infectious work up.    His labs are concerning for infection, but his CRP and procalcitonin may also be due to trauma and setting the brace on the leg. I've ordered a u/a and urine culture to r/o infection and he has no signs of cough, although I've ordered a CXR.    Summary:   REVIEW OF SYSTEMS    General:	"I'm comfortable."    Skin/Breast:  	  Ophthalmologic:  	  ENMT:	    Respiratory and Thorax:  	  Cardiovascular:	    Gastrointestinal:	    Genitourinary:	    Musculoskeletal:	    Neurological:	    Psychiatric:	    Hematology/Lymphatics:	    Endocrine:	    Allergic/Immunologic:	                              Vital Signs Last 24 Hrs  T(C): 36.9 (27 Aug 2018 08:04), Max: 38.3 (26 Aug 2018 16:20)  T(F): 98.4 (27 Aug 2018 08:04), Max: 101 (26 Aug 2018 16:20)  HR: 72 (27 Aug 2018 08:04) (72 - 94)  BP: 112/65 (27 Aug 2018 08:04) (112/63 - 134/60)  BP(mean): --  RR: 18 (27 Aug 2018 08:04) (15 - 20)  SpO2: 96% (27 Aug 2018 08:04) (93% - 96%)  General appearance: No acute distress, Awake, Alert, has mental deficiency, nontoxic, nonverbal but nods his head  HEENT: Normocephalic, Atraumatic, Conjunctiva clear, EOMI  Neck: Supple, No JVD, No tenderness  Lungs: Clear to auscultation, Breath sound equal bilaterally, No wheezes, No rales  Cardiovascular: S1S2, Regular rhythm  Abdomen: Soft, Nontender, Nondistended, No guarding/rebound, Positive bowel sounds  Extremities: No clubbing, No cyanosis, No calf tenderness, Bilateral lower extremity edema, Range of motion limited by pain  Neuro: Strength equal bilaterally, No tremors  Psychiatric: Appropriate mood, Normal affect                        9.7    11.9  )-----------( 217      ( 27 Aug 2018 07:38 )             31.4     08-27    138  |  97<L>  |  28.0<H>  ----------------------------<  165<H>  3.3<L>   |  29.0  |  1.17    Ca    9.0      27 Aug 2018 07:37  Phos  3.2     08-27  Mg     2.6     08-27    Radiology:     MEDICATIONS  (STANDING):  aspirin enteric coated 325 milliGRAM(s) Oral two times a day  atorvastatin 40 milliGRAM(s) Oral at bedtime  dextrose 5%. 1000 milliLiter(s) (50 mL/Hr) IV Continuous <Continuous>  dextrose 50% Injectable 12.5 Gram(s) IV Push once  dextrose 50% Injectable 25 Gram(s) IV Push once  dextrose 50% Injectable 25 Gram(s) IV Push once  enoxaparin Injectable 40 milliGRAM(s) SubCutaneous every 24 hours  folic acid 1 milliGRAM(s) Oral daily  insulin lispro (HumaLOG) corrective regimen sliding scale   SubCutaneous three times a day before meals  lactated ringers. 1000 milliLiter(s) (75 mL/Hr) IV Continuous <Continuous>  losartan 50 milliGRAM(s) Oral daily  piperacillin/tazobactam IVPB. 4.5 Gram(s) IV Intermittent every 8 hours  potassium chloride    Tablet ER 40 milliEquivalent(s) Oral every 4 hours  saccharomyces boulardii 250 milliGRAM(s) Oral two times a day  simethicone 80 milliGRAM(s) Chew four times a day  vancomycin  IVPB 1000 milliGRAM(s) IV Intermittent every 12 hours    MEDICATIONS  (PRN):  acetaminophen   Tablet 650 milliGRAM(s) Oral every 6 hours PRN For Temp greater than 38 C (100.4 F)  aluminum hydroxide/magnesium hydroxide/simethicone Suspension 30 milliLiter(s) Oral every 6 hours PRN Dyspepsia  dextrose 40% Gel 15 Gram(s) Oral once PRN Blood Glucose LESS THAN 70 milliGRAM(s)/deciliter  glucagon  Injectable 1 milliGRAM(s) IntraMuscular once PRN Glucose LESS THAN 70 milligrams/deciliter  oxyCODONE    IR 5 milliGRAM(s) Oral every 6 hours PRN Moderate Pain (4 - 6)
patient seen and examined. being followed for left tibial plateau fracture. states pain controlled. no acute changes in pain, no acute motor or sensory changes. reports no new complaints. orthotist fitted patient in Trudy brace    Vital Signs Last 24 Hrs  T(C): 37.8 (25 Aug 2018 09:27), Max: 38.3 (24 Aug 2018 20:06)  T(F): 100.1 (25 Aug 2018 09:27), Max: 101 (24 Aug 2018 20:06)  HR: 100 (25 Aug 2018 09:00) (84 - 100)  BP: 112/50 (25 Aug 2018 09:00) (108/51 - 151/74)  BP(mean): --  RR: 19 (25 Aug 2018 09:00) (16 - 23)  SpO2: 94% (25 Aug 2018 09:00) (93% - 98%)    PE: NAD, resting comfortably  LLE: trudy brace well placed place and intact, set 0-30 degrees   compartments soft  distal pulses appreciated  distal motor intact; + dorsi/plantar, EHL,FHL  sensation intact    A/P: 67 y/o Male with left tibial plateau fx    pain control   maintain trudy brace set at 0-30 degrees  NWB LLE  Follow up as out patient with Dr Petty in 1-2 weeks
 is a 69 y/o male who presented with left sided leg pain after sustaining a fall earlier before on day of admission.     He has had a brace placed by orthopedics and most likely, will need to be d/cd to rehab on Monday. He had a mild low grade fever, he appears nontoxic. He nods his head "no" when I ask him about pain. There appear to be no obvious signs of infection. We will continue to monitor and if his fevers continue, we can do an infectious work up.    Summary:   REVIEW OF SYSTEMS    General:	"I'm comfortable."    Skin/Breast:  	  Ophthalmologic:  	  ENMT:	    Respiratory and Thorax:  	  Cardiovascular:	    Gastrointestinal:	    Genitourinary:	    Musculoskeletal:	    Neurological:	    Psychiatric:	    Hematology/Lymphatics:	    Endocrine:	    Allergic/Immunologic:	  Vital Signs Last 24 Hrs  T(C): 37.8 (25 Aug 2018 09:27), Max: 38.3 (24 Aug 2018 20:06)  T(F): 100.1 (25 Aug 2018 09:27), Max: 101 (24 Aug 2018 20:06)  HR: 100 (25 Aug 2018 09:00) (84 - 100)  BP: 112/50 (25 Aug 2018 09:00) (108/51 - 151/74)  BP(mean): --  RR: 19 (25 Aug 2018 09:00) (16 - 23)  SpO2: 94% (25 Aug 2018 09:00) (93% - 98%)  General appearance: No acute distress, Awake, Alert, has mental deficiency, nontoxic, nonverbal but nods his head  HEENT: Normocephalic, Atraumatic, Conjunctiva clear, EOMI  Neck: Supple, No JVD, No tenderness  Lungs: Clear to auscultation, Breath sound equal bilaterally, No wheezes, No rales  Cardiovascular: S1S2, Regular rhythm  Abdomen: Soft, Nontender, Nondistended, No guarding/rebound, Positive bowel sounds  Extremities: No clubbing, No cyanosis, No calf tenderness, Bilateral lower extremity edema, Range of motion limited by pain  Neuro: Strength equal bilaterally, No tremors  Psychiatric: Appropriate mood, Normal affect                        10.6   15.6  )-----------( 230      ( 25 Aug 2018 08:09 )             34.2     08-25    140  |  97<L>  |  40.0<H>  ----------------------------<  160<H>  3.6   |  30.0<H>  |  1.83<H>    Ca    9.0      25 Aug 2018 08:07    TPro  7.9  /  Alb  3.9  /  TBili  0.2<L>  /  DBili  x   /  AST  28  /  ALT  31  /  AlkPhos  65  08-23    LIVER FUNCTIONS - ( 23 Aug 2018 18:42 )  Alb: 3.9 g/dL / Pro: 7.9 g/dL / ALK PHOS: 65 U/L / ALT: 31 U/L / AST: 28 U/L / GGT: x           PT/INR - ( 23 Aug 2018 18:42 )   PT: 10.9 sec;   INR: 0.99 ratio       PTT - ( 23 Aug 2018 18:42 )  PTT:33.2 sec    Radiology:     MEDICATIONS  (STANDING):  aspirin enteric coated 325 milliGRAM(s) Oral two times a day  atorvastatin 40 milliGRAM(s) Oral at bedtime  dextrose 5%. 1000 milliLiter(s) (50 mL/Hr) IV Continuous <Continuous>  dextrose 50% Injectable 12.5 Gram(s) IV Push once  dextrose 50% Injectable 25 Gram(s) IV Push once  dextrose 50% Injectable 25 Gram(s) IV Push once  enoxaparin Injectable 40 milliGRAM(s) SubCutaneous every 24 hours  folic acid 1 milliGRAM(s) Oral daily  insulin lispro (HumaLOG) corrective regimen sliding scale   SubCutaneous three times a day before meals  lactated ringers. 1000 milliLiter(s) (75 mL/Hr) IV Continuous <Continuous>  losartan 50 milliGRAM(s) Oral daily    MEDICATIONS  (PRN):  acetaminophen   Tablet 650 milliGRAM(s) Oral every 6 hours PRN For Temp greater than 38 C (100.4 F)  dextrose 40% Gel 15 Gram(s) Oral once PRN Blood Glucose LESS THAN 70 milliGRAM(s)/deciliter  glucagon  Injectable 1 milliGRAM(s) IntraMuscular once PRN Glucose LESS THAN 70 milligrams/deciliter  oxyCODONE    IR 5 milliGRAM(s) Oral every 6 hours PRN Moderate Pain (4 - 6)

## 2018-08-30 NOTE — PROGRESS NOTE ADULT - PROBLEM SELECTOR PROBLEM 4
Pneumonia of lower lobe due to infectious organism, unspecified laterality

## 2018-08-30 NOTE — PROGRESS NOTE ADULT - PROBLEM SELECTOR PLAN 3
-start LR  -creatinine has increased a little today  -will continue to monitor
-creatinine trended down after IVF  -d/c LR now  -considering infection r/o, sending u/a and urine culture, and have ordered CXR

## 2018-08-30 NOTE — DIETITIAN INITIAL EVALUATION ADULT. - OTHER INFO
Pt states potential wt loss, unable to provide UBW or amount of wt loss. Pt with good appetite, no N/V/C/D reported.

## 2018-09-02 LAB
CULTURE RESULTS: SIGNIFICANT CHANGE UP
SPECIMEN SOURCE: SIGNIFICANT CHANGE UP

## 2018-09-17 ENCOUNTER — INPATIENT (INPATIENT)
Facility: HOSPITAL | Age: 68
LOS: 9 days | Discharge: HOME HEALTH | DRG: 871 | End: 2018-09-27
Attending: HOSPITALIST | Admitting: INTERNAL MEDICINE
Payer: MEDICARE

## 2018-09-17 VITALS
WEIGHT: 212.97 LBS | RESPIRATION RATE: 22 BRPM | HEART RATE: 124 BPM | OXYGEN SATURATION: 95 % | SYSTOLIC BLOOD PRESSURE: 117 MMHG | DIASTOLIC BLOOD PRESSURE: 86 MMHG | TEMPERATURE: 102 F

## 2018-09-17 DIAGNOSIS — R41.82 ALTERED MENTAL STATUS, UNSPECIFIED: ICD-10-CM

## 2018-09-17 DIAGNOSIS — A41.9 SEPSIS, UNSPECIFIED ORGANISM: ICD-10-CM

## 2018-09-17 DIAGNOSIS — N17.9 ACUTE KIDNEY FAILURE, UNSPECIFIED: ICD-10-CM

## 2018-09-17 DIAGNOSIS — G93.40 ENCEPHALOPATHY, UNSPECIFIED: ICD-10-CM

## 2018-09-17 DIAGNOSIS — N39.0 URINARY TRACT INFECTION, SITE NOT SPECIFIED: ICD-10-CM

## 2018-09-17 PROBLEM — D64.9 ANEMIA, UNSPECIFIED: Chronic | Status: ACTIVE | Noted: 2018-08-23

## 2018-09-17 PROBLEM — E13.8 OTHER SPECIFIED DIABETES MELLITUS WITH UNSPECIFIED COMPLICATIONS: Chronic | Status: ACTIVE | Noted: 2018-08-23

## 2018-09-17 PROBLEM — I10 ESSENTIAL (PRIMARY) HYPERTENSION: Chronic | Status: ACTIVE | Noted: 2018-08-23

## 2018-09-17 PROBLEM — E78.5 HYPERLIPIDEMIA, UNSPECIFIED: Chronic | Status: ACTIVE | Noted: 2018-08-23

## 2018-09-17 LAB
ALBUMIN SERPL ELPH-MCNC: 3.3 G/DL — SIGNIFICANT CHANGE UP (ref 3.3–5.2)
ALP SERPL-CCNC: 92 U/L — SIGNIFICANT CHANGE UP (ref 40–120)
ALT FLD-CCNC: 23 U/L — SIGNIFICANT CHANGE UP
ANION GAP SERPL CALC-SCNC: 16 MMOL/L — SIGNIFICANT CHANGE UP (ref 5–17)
ANION GAP SERPL CALC-SCNC: 18 MMOL/L — HIGH (ref 5–17)
APPEARANCE UR: ABNORMAL
APTT BLD: 33.2 SEC — SIGNIFICANT CHANGE UP (ref 27.5–37.4)
AST SERPL-CCNC: 28 U/L — SIGNIFICANT CHANGE UP
BACTERIA # UR AUTO: ABNORMAL
BASOPHILS # BLD AUTO: 0 K/UL — SIGNIFICANT CHANGE UP (ref 0–0.2)
BASOPHILS NFR BLD AUTO: 0.1 % — SIGNIFICANT CHANGE UP (ref 0–2)
BILIRUB SERPL-MCNC: 0.6 MG/DL — SIGNIFICANT CHANGE UP (ref 0.4–2)
BILIRUB UR-MCNC: NEGATIVE — SIGNIFICANT CHANGE UP
BLD GP AB SCN SERPL QL: SIGNIFICANT CHANGE UP
BUN SERPL-MCNC: 37 MG/DL — HIGH (ref 8–20)
BUN SERPL-MCNC: 37 MG/DL — HIGH (ref 8–20)
CALCIUM SERPL-MCNC: 8.5 MG/DL — LOW (ref 8.6–10.2)
CALCIUM SERPL-MCNC: 9.4 MG/DL — SIGNIFICANT CHANGE UP (ref 8.6–10.2)
CHLORIDE SERPL-SCNC: 105 MMOL/L — SIGNIFICANT CHANGE UP (ref 98–107)
CHLORIDE SERPL-SCNC: 99 MMOL/L — SIGNIFICANT CHANGE UP (ref 98–107)
CK MB CFR SERPL CALC: 4.6 NG/ML — SIGNIFICANT CHANGE UP (ref 0–6.7)
CK MB CFR SERPL CALC: 8.4 NG/ML — HIGH (ref 0–6.7)
CK SERPL-CCNC: 559 U/L — HIGH (ref 30–200)
CK SERPL-CCNC: 636 U/L — HIGH (ref 30–200)
CO2 SERPL-SCNC: 18 MMOL/L — LOW (ref 22–29)
CO2 SERPL-SCNC: 22 MMOL/L — SIGNIFICANT CHANGE UP (ref 22–29)
COLOR SPEC: ABNORMAL
CREAT SERPL-MCNC: 1.92 MG/DL — HIGH (ref 0.5–1.3)
CREAT SERPL-MCNC: 1.97 MG/DL — HIGH (ref 0.5–1.3)
DIFF PNL FLD: ABNORMAL
EOSINOPHIL # BLD AUTO: 0 K/UL — SIGNIFICANT CHANGE UP (ref 0–0.5)
EOSINOPHIL NFR BLD AUTO: 0 % — SIGNIFICANT CHANGE UP (ref 0–5)
EPI CELLS # UR: SIGNIFICANT CHANGE UP
GLUCOSE BLDC GLUCOMTR-MCNC: 124 MG/DL — HIGH (ref 70–99)
GLUCOSE BLDC GLUCOMTR-MCNC: 129 MG/DL — HIGH (ref 70–99)
GLUCOSE SERPL-MCNC: 125 MG/DL — HIGH (ref 70–115)
GLUCOSE SERPL-MCNC: 141 MG/DL — HIGH (ref 70–115)
GLUCOSE UR QL: NEGATIVE MG/DL — SIGNIFICANT CHANGE UP
HCT VFR BLD CALC: 30.2 % — LOW (ref 42–52)
HCT VFR BLD CALC: 33.7 % — LOW (ref 42–52)
HGB BLD-MCNC: 10.3 G/DL — LOW (ref 14–18)
HGB BLD-MCNC: 9.2 G/DL — LOW (ref 14–18)
INR BLD: 1.22 RATIO — HIGH (ref 0.88–1.16)
KETONES UR-MCNC: NEGATIVE — SIGNIFICANT CHANGE UP
LACTATE BLDV-MCNC: 4.5 MMOL/L — CRITICAL HIGH (ref 0.5–2)
LACTATE SERPL-SCNC: <0.2 MMOL/L — LOW (ref 0.5–2)
LEUKOCYTE ESTERASE UR-ACNC: ABNORMAL
LIDOCAIN IGE QN: 38 U/L — SIGNIFICANT CHANGE UP (ref 22–51)
LYMPHOCYTES # BLD AUTO: 0.8 K/UL — LOW (ref 1–4.8)
LYMPHOCYTES # BLD AUTO: 4.3 % — LOW (ref 20–55)
MAGNESIUM SERPL-MCNC: 1.6 MG/DL — SIGNIFICANT CHANGE UP (ref 1.6–2.6)
MAGNESIUM SERPL-MCNC: 2 MG/DL — SIGNIFICANT CHANGE UP (ref 1.8–2.6)
MCHC RBC-ENTMCNC: 27.9 PG — SIGNIFICANT CHANGE UP (ref 27–31)
MCHC RBC-ENTMCNC: 28.1 PG — SIGNIFICANT CHANGE UP (ref 27–31)
MCHC RBC-ENTMCNC: 30.5 G/DL — LOW (ref 32–36)
MCHC RBC-ENTMCNC: 30.6 G/DL — LOW (ref 32–36)
MCV RBC AUTO: 91.3 FL — SIGNIFICANT CHANGE UP (ref 80–94)
MCV RBC AUTO: 92.4 FL — SIGNIFICANT CHANGE UP (ref 80–94)
MONOCYTES # BLD AUTO: 1 K/UL — HIGH (ref 0–0.8)
MONOCYTES NFR BLD AUTO: 5.7 % — SIGNIFICANT CHANGE UP (ref 3–10)
NEUTROPHILS # BLD AUTO: 15 K/UL — HIGH (ref 1.8–8)
NEUTROPHILS NFR BLD AUTO: 86.7 % — HIGH (ref 37–73)
NITRITE UR-MCNC: NEGATIVE — SIGNIFICANT CHANGE UP
PH UR: 5 — SIGNIFICANT CHANGE UP (ref 5–8)
PHOSPHATE SERPL-MCNC: 2.4 MG/DL — SIGNIFICANT CHANGE UP (ref 2.4–4.7)
PHOSPHATE SERPL-MCNC: 4.1 MG/DL — SIGNIFICANT CHANGE UP (ref 2.4–4.7)
PLATELET # BLD AUTO: 299 K/UL — SIGNIFICANT CHANGE UP (ref 150–400)
PLATELET # BLD AUTO: 320 K/UL — SIGNIFICANT CHANGE UP (ref 150–400)
POTASSIUM SERPL-MCNC: 4.2 MMOL/L — SIGNIFICANT CHANGE UP (ref 3.5–5.3)
POTASSIUM SERPL-MCNC: 4.4 MMOL/L — SIGNIFICANT CHANGE UP (ref 3.5–5.3)
POTASSIUM SERPL-SCNC: 4.2 MMOL/L — SIGNIFICANT CHANGE UP (ref 3.5–5.3)
POTASSIUM SERPL-SCNC: 4.4 MMOL/L — SIGNIFICANT CHANGE UP (ref 3.5–5.3)
PROT SERPL-MCNC: 7.7 G/DL — SIGNIFICANT CHANGE UP (ref 6.6–8.7)
PROT UR-MCNC: 100 MG/DL
PROTHROM AB SERPL-ACNC: 13.5 SEC — HIGH (ref 9.8–12.7)
RBC # BLD: 3.27 M/UL — LOW (ref 4.6–6.2)
RBC # BLD: 3.69 M/UL — LOW (ref 4.6–6.2)
RBC # FLD: 16.9 % — HIGH (ref 11–15.6)
RBC # FLD: 17.3 % — HIGH (ref 11–15.6)
RBC CASTS # UR COMP ASSIST: ABNORMAL /HPF (ref 0–4)
SODIUM SERPL-SCNC: 139 MMOL/L — SIGNIFICANT CHANGE UP (ref 135–145)
SODIUM SERPL-SCNC: 139 MMOL/L — SIGNIFICANT CHANGE UP (ref 135–145)
SP GR SPEC: 1.02 — SIGNIFICANT CHANGE UP (ref 1.01–1.02)
TROPONIN T SERPL-MCNC: 0.11 NG/ML — HIGH (ref 0–0.06)
TROPONIN T SERPL-MCNC: 0.11 NG/ML — HIGH (ref 0–0.06)
TSH SERPL-MCNC: 1.45 UIU/ML — SIGNIFICANT CHANGE UP (ref 0.27–4.2)
TYPE + AB SCN PNL BLD: SIGNIFICANT CHANGE UP
UROBILINOGEN FLD QL: 1 MG/DL
WBC # BLD: 17.2 K/UL — HIGH (ref 4.8–10.8)
WBC # BLD: 20.3 K/UL — HIGH (ref 4.8–10.8)
WBC # FLD AUTO: 17.2 K/UL — HIGH (ref 4.8–10.8)
WBC # FLD AUTO: 20.3 K/UL — HIGH (ref 4.8–10.8)
WBC UR QL: ABNORMAL

## 2018-09-17 PROCEDURE — 36556 INSERT NON-TUNNEL CV CATH: CPT

## 2018-09-17 PROCEDURE — 71045 X-RAY EXAM CHEST 1 VIEW: CPT | Mod: 26

## 2018-09-17 PROCEDURE — 99291 CRITICAL CARE FIRST HOUR: CPT | Mod: 25

## 2018-09-17 PROCEDURE — 93010 ELECTROCARDIOGRAM REPORT: CPT

## 2018-09-17 PROCEDURE — 99222 1ST HOSP IP/OBS MODERATE 55: CPT

## 2018-09-17 RX ORDER — VANCOMYCIN HCL 1 G
1000 VIAL (EA) INTRAVENOUS ONCE
Qty: 0 | Refills: 0 | Status: COMPLETED | OUTPATIENT
Start: 2018-09-17 | End: 2018-09-17

## 2018-09-17 RX ORDER — ASPIRIN/CALCIUM CARB/MAGNESIUM 324 MG
325 TABLET ORAL DAILY
Qty: 0 | Refills: 0 | Status: DISCONTINUED | OUTPATIENT
Start: 2018-09-17 | End: 2018-09-27

## 2018-09-17 RX ORDER — ATORVASTATIN CALCIUM 80 MG/1
80 TABLET, FILM COATED ORAL AT BEDTIME
Qty: 0 | Refills: 0 | Status: DISCONTINUED | OUTPATIENT
Start: 2018-09-17 | End: 2018-09-27

## 2018-09-17 RX ORDER — ENOXAPARIN SODIUM 100 MG/ML
30 INJECTION SUBCUTANEOUS DAILY
Qty: 0 | Refills: 0 | Status: DISCONTINUED | OUTPATIENT
Start: 2018-09-17 | End: 2018-09-17

## 2018-09-17 RX ORDER — CEFEPIME 1 G/1
INJECTION, POWDER, FOR SOLUTION INTRAMUSCULAR; INTRAVENOUS
Qty: 0 | Refills: 0 | Status: DISCONTINUED | OUTPATIENT
Start: 2018-09-17 | End: 2018-09-19

## 2018-09-17 RX ORDER — INSULIN LISPRO 100/ML
VIAL (ML) SUBCUTANEOUS
Qty: 0 | Refills: 0 | Status: DISCONTINUED | OUTPATIENT
Start: 2018-09-17 | End: 2018-09-27

## 2018-09-17 RX ORDER — DEXMEDETOMIDINE HYDROCHLORIDE IN 0.9% SODIUM CHLORIDE 4 UG/ML
0.2 INJECTION INTRAVENOUS
Qty: 200 | Refills: 0 | Status: DISCONTINUED | OUTPATIENT
Start: 2018-09-17 | End: 2018-09-17

## 2018-09-17 RX ORDER — CEFEPIME 1 G/1
2000 INJECTION, POWDER, FOR SOLUTION INTRAMUSCULAR; INTRAVENOUS ONCE
Qty: 0 | Refills: 0 | Status: COMPLETED | OUTPATIENT
Start: 2018-09-17 | End: 2018-09-17

## 2018-09-17 RX ORDER — NOREPINEPHRINE BITARTRATE/D5W 8 MG/250ML
0.1 PLASTIC BAG, INJECTION (ML) INTRAVENOUS
Qty: 8 | Refills: 0 | Status: DISCONTINUED | OUTPATIENT
Start: 2018-09-17 | End: 2018-09-17

## 2018-09-17 RX ORDER — CEFTRIAXONE 500 MG/1
1 INJECTION, POWDER, FOR SOLUTION INTRAMUSCULAR; INTRAVENOUS ONCE
Qty: 0 | Refills: 0 | Status: COMPLETED | OUTPATIENT
Start: 2018-09-17 | End: 2018-09-17

## 2018-09-17 RX ORDER — SODIUM CHLORIDE 9 MG/ML
1000 INJECTION, SOLUTION INTRAVENOUS
Qty: 0 | Refills: 0 | Status: DISCONTINUED | OUTPATIENT
Start: 2018-09-17 | End: 2018-09-27

## 2018-09-17 RX ORDER — GLUCAGON INJECTION, SOLUTION 0.5 MG/.1ML
1 INJECTION, SOLUTION SUBCUTANEOUS ONCE
Qty: 0 | Refills: 0 | Status: DISCONTINUED | OUTPATIENT
Start: 2018-09-17 | End: 2018-09-27

## 2018-09-17 RX ORDER — SODIUM CHLORIDE 9 MG/ML
2000 INJECTION INTRAMUSCULAR; INTRAVENOUS; SUBCUTANEOUS ONCE
Qty: 0 | Refills: 0 | Status: COMPLETED | OUTPATIENT
Start: 2018-09-17 | End: 2018-09-17

## 2018-09-17 RX ORDER — DEXTROSE 50 % IN WATER 50 %
12.5 SYRINGE (ML) INTRAVENOUS ONCE
Qty: 0 | Refills: 0 | Status: DISCONTINUED | OUTPATIENT
Start: 2018-09-17 | End: 2018-09-27

## 2018-09-17 RX ORDER — CEFEPIME 1 G/1
2000 INJECTION, POWDER, FOR SOLUTION INTRAMUSCULAR; INTRAVENOUS EVERY 12 HOURS
Qty: 0 | Refills: 0 | Status: DISCONTINUED | OUTPATIENT
Start: 2018-09-17 | End: 2018-09-19

## 2018-09-17 RX ORDER — PIPERACILLIN AND TAZOBACTAM 4; .5 G/20ML; G/20ML
3.38 INJECTION, POWDER, LYOPHILIZED, FOR SOLUTION INTRAVENOUS ONCE
Qty: 0 | Refills: 0 | Status: COMPLETED | OUTPATIENT
Start: 2018-09-17 | End: 2018-09-17

## 2018-09-17 RX ORDER — DEXTROSE 50 % IN WATER 50 %
25 SYRINGE (ML) INTRAVENOUS ONCE
Qty: 0 | Refills: 0 | Status: DISCONTINUED | OUTPATIENT
Start: 2018-09-17 | End: 2018-09-27

## 2018-09-17 RX ORDER — DEXTROSE 50 % IN WATER 50 %
15 SYRINGE (ML) INTRAVENOUS ONCE
Qty: 0 | Refills: 0 | Status: DISCONTINUED | OUTPATIENT
Start: 2018-09-17 | End: 2018-09-27

## 2018-09-17 RX ORDER — ACETAMINOPHEN 500 MG
1000 TABLET ORAL ONCE
Qty: 0 | Refills: 0 | Status: COMPLETED | OUTPATIENT
Start: 2018-09-17 | End: 2018-09-17

## 2018-09-17 RX ORDER — SODIUM CHLORIDE 9 MG/ML
1000 INJECTION INTRAMUSCULAR; INTRAVENOUS; SUBCUTANEOUS ONCE
Qty: 0 | Refills: 0 | Status: COMPLETED | OUTPATIENT
Start: 2018-09-17 | End: 2018-09-17

## 2018-09-17 RX ORDER — HALOPERIDOL DECANOATE 100 MG/ML
5 INJECTION INTRAMUSCULAR ONCE
Qty: 0 | Refills: 0 | Status: COMPLETED | OUTPATIENT
Start: 2018-09-17 | End: 2018-09-17

## 2018-09-17 RX ADMIN — Medication 2 MILLIGRAM(S): at 01:28

## 2018-09-17 RX ADMIN — Medication 400 MILLIGRAM(S): at 01:55

## 2018-09-17 RX ADMIN — CEFEPIME 100 MILLIGRAM(S): 1 INJECTION, POWDER, FOR SOLUTION INTRAMUSCULAR; INTRAVENOUS at 20:55

## 2018-09-17 RX ADMIN — SODIUM CHLORIDE 1000 MILLILITER(S): 9 INJECTION INTRAMUSCULAR; INTRAVENOUS; SUBCUTANEOUS at 03:52

## 2018-09-17 RX ADMIN — Medication 250 MILLIGRAM(S): at 05:54

## 2018-09-17 RX ADMIN — Medication 1000 MILLIGRAM(S): at 02:10

## 2018-09-17 RX ADMIN — CEFEPIME 100 MILLIGRAM(S): 1 INJECTION, POWDER, FOR SOLUTION INTRAMUSCULAR; INTRAVENOUS at 05:53

## 2018-09-17 RX ADMIN — PIPERACILLIN AND TAZOBACTAM 200 GRAM(S): 4; .5 INJECTION, POWDER, LYOPHILIZED, FOR SOLUTION INTRAVENOUS at 02:53

## 2018-09-17 RX ADMIN — SODIUM CHLORIDE 2000 MILLILITER(S): 9 INJECTION INTRAMUSCULAR; INTRAVENOUS; SUBCUTANEOUS at 03:52

## 2018-09-17 RX ADMIN — SODIUM CHLORIDE 4000 MILLILITER(S): 9 INJECTION INTRAMUSCULAR; INTRAVENOUS; SUBCUTANEOUS at 01:30

## 2018-09-17 RX ADMIN — Medication 2 MILLIGRAM(S): at 10:00

## 2018-09-17 RX ADMIN — HALOPERIDOL DECANOATE 5 MILLIGRAM(S): 100 INJECTION INTRAMUSCULAR at 01:30

## 2018-09-17 RX ADMIN — DEXMEDETOMIDINE HYDROCHLORIDE IN 0.9% SODIUM CHLORIDE 5.2 MICROGRAM(S)/KG/HR: 4 INJECTION INTRAVENOUS at 09:30

## 2018-09-17 RX ADMIN — Medication 18.11 MICROGRAM(S)/KG/MIN: at 02:36

## 2018-09-17 RX ADMIN — SODIUM CHLORIDE 2000 MILLILITER(S): 9 INJECTION INTRAMUSCULAR; INTRAVENOUS; SUBCUTANEOUS at 02:42

## 2018-09-17 RX ADMIN — Medication 2 MILLIGRAM(S): at 01:34

## 2018-09-17 RX ADMIN — ATORVASTATIN CALCIUM 80 MILLIGRAM(S): 80 TABLET, FILM COATED ORAL at 22:52
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
## 2018-09-17 NOTE — ED ADULT NURSE NOTE - ED STAT RN HANDOFF DETAILS
pt stable for transport on cm with  and levo @ 0.07mcg/kg/min. In no apparent distress. 2x ed RN's bedside for transport pt moved from stretcher to icu bed safely

## 2018-09-17 NOTE — ED PROVIDER NOTE - MEDICAL DECISION MAKING DETAILS
septic hypotneeisve sepsis worsening delirium nee dfor hihgh intensity nurinsg h/o MR pulling a tmulitpl vandana improve josué [place] will admit to icu

## 2018-09-17 NOTE — ED PROVIDER NOTE - MUSCULOSKELETAL, MLM
Spine appears normal, range of motion is not limited, no muscle or joint tenderness Spine appears normal, range of motion is not limited, no muscle or joint tenderness. L leg in brace.

## 2018-09-17 NOTE — ED PROVIDER NOTE - PROGRESS NOTE DETAILS
Will not give full fluid bolus since pt appears to already be at fluid overload. Pt combative in the ED  Will re-asses after 2 L of fluid. Pt combative, refuses to swallow and unable to do rectal exam,. Will give IV Tylenol. Pt required to be chemically sedated secondary to pt biting staff/ attempting to hit staff with blood pressure cuff. Unable to obtain workup without sedation. Case discussed with ICU

## 2018-09-17 NOTE — ED ADULT TRIAGE NOTE - CHIEF COMPLAINT QUOTE
called to bedside quan pt confused unable to cooperate with RN assessment , hot to touch upon arrival code sepsis activated. lft leg knee brace  splint ems reports tib fix fx 2-3 weeks ago as per ems SNF reports pt with frequent falls within pass 2 days, 10mg versed given for sedation im

## 2018-09-17 NOTE — ED PROVIDER NOTE - CRITICAL CARE PROVIDED
consult w/ pt's family directly relating to pts condition/documentation/interpretation of diagnostic studies/consultation with other physicians/additional history taking/direct patient care (not related to procedure)

## 2018-09-17 NOTE — H&P ADULT - ATTENDING COMMENTS
69 yo male with hx of mental retardation, obesity, HTN, DM, recent left tibial plateau fracture, admitted with altered mental status/ encephalopathy, found to have UTI, septic shock.  Now with decreasing doses of pressors.  Required precedex drip to control agitation.

## 2018-09-17 NOTE — H&P ADULT - HISTORY OF PRESENT ILLNESS
68 year old male PMHx MRDD (unspecified type), DM2, HTN, HLD, Recent fall with Left Tibial plateau Fx, from rehab with change in mental status.  Noted in ED to be combative requiring sedation to facilitate work up.  Noted to have UTI, febrile to 102.  CXR is clear.

## 2018-09-17 NOTE — ED ADULT NURSE NOTE - NSIMPLEMENTINTERV_GEN_ALL_ED
Implemented All Fall with Harm Risk Interventions:  [place] to call system. Call bell, personal items and telephone within reach. Instruct patient to call for assistance. Room bathroom lighting operational. Non-slip footwear when patient is off stretcher. Physically safe environment: no spills, clutter or unnecessary equipment. Stretcher in lowest position, wheels locked, appropriate side rails in place. Provide visual cue, wrist band, yellow gown, etc. Monitor gait and stability. Monitor for mental status changes and reorient to person, place, and time. Review medications for side effects contributing to fall risk. Reinforce activity limits and safety measures with patient and family. Provide visual clues: red socks.

## 2018-09-17 NOTE — PROVIDER CONTACT NOTE (EICU) - RECOMMENDATIONS
[email or web address] for presumed UTI, got 3 L IVF;  trend lactate velocity. If he does not wake up soon will need head CT. satting well.  F/U cultures.

## 2018-09-17 NOTE — ED ADULT NURSE REASSESSMENT NOTE - NS ED NURSE REASSESS COMMENT FT1
central line inserted to right groin by dr cooper central line inserted to right groin by dr cooper. per md no xray needed, placement confirmed via us and + blood return

## 2018-09-17 NOTE — PROVIDER CONTACT NOTE (EICU) - BACKGROUND
67 y/o male with a hx of MRDD in a SNF, anemia, DM, hyperlipidemia, and HTN BIBA for AMS from SNf. According to EMS, nursing home stated that pt has been acting more confused over the past day. They note that pt has had frequent falls. Pt was being combative towards staff upon arrival. HPI and ROS limited 2ndary to pt's condition. UA positive, Has gotten mulitple pushes of haldol and ativan for agitation. Ua positive, initial lactate 4.5

## 2018-09-17 NOTE — H&P ADULT - ASSESSMENT
68 year old male PMHx MRDD (unspecified type), DM2, HTN, HLD, Recent fall with Left Tibial plateau Fx, from rehab with change in mental status.      UTI / Sepsis   BOUCHRA likely ATN from Sepsis and Hypotension   Septic Shock Requiring Pressor  Lactic Acidosis   Encephalopathy     NEURO - Avoid further sedatives - May need antipsychotics avoid benzo's                  May Need Head CT if MS not clearing - No report of fall     PULM - Sedated but protecting airway  - unlabored on NC     CV - Levophed for BP support have titrated down to 0.1 mcg/kg  attempt to titrate to off          received 2 L crystalloid - full 30cc/kg not given as pt was seeming overloaded in ED     GI - Diet as tolerates / PPI     RENAL - Watch Lytes and UOP / Trend Scr                Will hold Home ACE given BOUCHRA                Trend lactate     ID - Maxipime / Vanco by trough - F/U Cultures     DVT - Will hold lovenox until able to better access MS

## 2018-09-17 NOTE — ED PROCEDURE NOTE - CPROC ED INFUS LINE DETAIL1
The location was identified, and the area was draped and prepped./All lumen(s) aspirated and flushed without difficulty./The catheter was placed using sterile technique./The guidewire was recovered.

## 2018-09-17 NOTE — ED PROVIDER NOTE - OBJECTIVE STATEMENT
67 y/o male with a hx of 69 y/o male with a hx of anemia, DM, hyperlipidemia, and HTN BIBA for AMS. According to EMS, nurshing home stated that pt has been acting more confused over the past day. They note that pt has had frequent falls. Pt was being physically agressive towards staff upon arrival. HPI and ROS limited 2ndary to pt's condition. 67 y/o male with a hx of anemia, DM, hyperlipidemia, and HTN BIBA for AMS. According to EMS, nursing home stated that pt has been acting more confused over the past day. They note that pt has had frequent falls. Pt was being combative towards staff upon arrival. HPI and ROS limited 2ndary to pt's condition.

## 2018-09-18 LAB
-  K. PNEUMONIAE GROUP: SIGNIFICANT CHANGE UP
-  KPC RESISTANCE GENE: SIGNIFICANT CHANGE UP
ALBUMIN SERPL ELPH-MCNC: 2.7 G/DL — LOW (ref 3.3–5.2)
ALP SERPL-CCNC: 81 U/L — SIGNIFICANT CHANGE UP (ref 40–120)
ALT FLD-CCNC: 23 U/L — SIGNIFICANT CHANGE UP
ANION GAP SERPL CALC-SCNC: 13 MMOL/L — SIGNIFICANT CHANGE UP (ref 5–17)
AST SERPL-CCNC: 28 U/L — SIGNIFICANT CHANGE UP
BASOPHILS # BLD AUTO: 0 K/UL — SIGNIFICANT CHANGE UP (ref 0–0.2)
BASOPHILS NFR BLD AUTO: 0.1 % — SIGNIFICANT CHANGE UP (ref 0–2)
BILIRUB SERPL-MCNC: 0.4 MG/DL — SIGNIFICANT CHANGE UP (ref 0.4–2)
BUN SERPL-MCNC: 24 MG/DL — HIGH (ref 8–20)
CALCIUM SERPL-MCNC: 8.8 MG/DL — SIGNIFICANT CHANGE UP (ref 8.6–10.2)
CHLORIDE SERPL-SCNC: 105 MMOL/L — SIGNIFICANT CHANGE UP (ref 98–107)
CO2 SERPL-SCNC: 21 MMOL/L — LOW (ref 22–29)
CREAT SERPL-MCNC: 1.01 MG/DL — SIGNIFICANT CHANGE UP (ref 0.5–1.3)
EOSINOPHIL # BLD AUTO: 0.1 K/UL — SIGNIFICANT CHANGE UP (ref 0–0.5)
EOSINOPHIL NFR BLD AUTO: 1.1 % — SIGNIFICANT CHANGE UP (ref 0–5)
GLUCOSE BLDC GLUCOMTR-MCNC: 104 MG/DL — HIGH (ref 70–99)
GLUCOSE BLDC GLUCOMTR-MCNC: 109 MG/DL — HIGH (ref 70–99)
GLUCOSE BLDC GLUCOMTR-MCNC: 110 MG/DL — HIGH (ref 70–99)
GLUCOSE BLDC GLUCOMTR-MCNC: 137 MG/DL — HIGH (ref 70–99)
GLUCOSE SERPL-MCNC: 110 MG/DL — SIGNIFICANT CHANGE UP (ref 70–115)
HBA1C BLD-MCNC: 6.6 % — HIGH (ref 4–5.6)
HCT VFR BLD CALC: 30.3 % — LOW (ref 42–52)
HGB BLD-MCNC: 9.4 G/DL — LOW (ref 14–18)
LYMPHOCYTES # BLD AUTO: 0.9 K/UL — LOW (ref 1–4.8)
LYMPHOCYTES # BLD AUTO: 8.9 % — LOW (ref 20–55)
MAGNESIUM SERPL-MCNC: 2.1 MG/DL — SIGNIFICANT CHANGE UP (ref 1.6–2.6)
MCHC RBC-ENTMCNC: 27.9 PG — SIGNIFICANT CHANGE UP (ref 27–31)
MCHC RBC-ENTMCNC: 31 G/DL — LOW (ref 32–36)
MCV RBC AUTO: 89.9 FL — SIGNIFICANT CHANGE UP (ref 80–94)
METHOD TYPE: SIGNIFICANT CHANGE UP
MONOCYTES # BLD AUTO: 0.7 K/UL — SIGNIFICANT CHANGE UP (ref 0–0.8)
MONOCYTES NFR BLD AUTO: 6.9 % — SIGNIFICANT CHANGE UP (ref 3–10)
NEUTROPHILS # BLD AUTO: 8.6 K/UL — HIGH (ref 1.8–8)
NEUTROPHILS NFR BLD AUTO: 82.8 % — HIGH (ref 37–73)
PHOSPHATE SERPL-MCNC: 2.6 MG/DL — SIGNIFICANT CHANGE UP (ref 2.4–4.7)
PLATELET # BLD AUTO: 283 K/UL — SIGNIFICANT CHANGE UP (ref 150–400)
POTASSIUM SERPL-MCNC: 3.8 MMOL/L — SIGNIFICANT CHANGE UP (ref 3.5–5.3)
POTASSIUM SERPL-SCNC: 3.8 MMOL/L — SIGNIFICANT CHANGE UP (ref 3.5–5.3)
PROT SERPL-MCNC: 6.7 G/DL — SIGNIFICANT CHANGE UP (ref 6.6–8.7)
RBC # BLD: 3.37 M/UL — LOW (ref 4.6–6.2)
RBC # FLD: 17.1 % — HIGH (ref 11–15.6)
SODIUM SERPL-SCNC: 139 MMOL/L — SIGNIFICANT CHANGE UP (ref 135–145)
WBC # BLD: 10.4 K/UL — SIGNIFICANT CHANGE UP (ref 4.8–10.8)
WBC # FLD AUTO: 10.4 K/UL — SIGNIFICANT CHANGE UP (ref 4.8–10.8)

## 2018-09-18 PROCEDURE — 99233 SBSQ HOSP IP/OBS HIGH 50: CPT

## 2018-09-18 PROCEDURE — 12345: CPT | Mod: NC

## 2018-09-18 RX ORDER — ACETAMINOPHEN 500 MG
1000 TABLET ORAL ONCE
Qty: 0 | Refills: 0 | Status: COMPLETED | OUTPATIENT
Start: 2018-09-18 | End: 2018-09-19

## 2018-09-18 RX ORDER — SODIUM CHLORIDE 9 MG/ML
1000 INJECTION, SOLUTION INTRAVENOUS
Qty: 0 | Refills: 0 | Status: DISCONTINUED | OUTPATIENT
Start: 2018-09-18 | End: 2018-09-22

## 2018-09-18 RX ADMIN — SODIUM CHLORIDE 100 MILLILITER(S): 9 INJECTION, SOLUTION INTRAVENOUS at 02:00

## 2018-09-18 RX ADMIN — Medication 325 MILLIGRAM(S): at 12:01

## 2018-09-18 RX ADMIN — CEFEPIME 100 MILLIGRAM(S): 1 INJECTION, POWDER, FOR SOLUTION INTRAMUSCULAR; INTRAVENOUS at 18:20

## 2018-09-18 RX ADMIN — ATORVASTATIN CALCIUM 80 MILLIGRAM(S): 80 TABLET, FILM COATED ORAL at 21:52

## 2018-09-18 RX ADMIN — CEFEPIME 100 MILLIGRAM(S): 1 INJECTION, POWDER, FOR SOLUTION INTRAMUSCULAR; INTRAVENOUS at 06:20

## 2018-09-18 NOTE — PATIENT PROFILE ADULT. - HEALTH/HEALTHCARE ANXIETIES, PROFILE
received pt at 1601. sent from Dr. Jon office. states that she has been feeling weak for the last few days, saw Dr. Read today where she was found to have a hemoglobin in the 4's. sent here for further evaluation. c/o weakness at this time. denies SOB, chest pain, nausea, vomiting, fevers, chills. pt has a hx of ovarian CA, last chemo 3 weeks ago. unable to place IVL, labs drawn. will monitor. nothing at this time

## 2018-09-18 NOTE — PROGRESS NOTE ADULT - SUBJECTIVE AND OBJECTIVE BOX
Patient seen and examined at bedside.   Patient presented to the hospital with altered mental status found to be in septic shock 2/2 to UTI.  PMHx MRDD (unspecified type), DM2, HTN, HLD, Recent fall with Left Tibial plateau Fx, from rehab with change in mental status.    Patient was in ICU requiring Levophed but now off pressors doing well. Patient answering questions and following commands. No complaints aside from weakness. Denies headaches, nausea, vomiting, pain, diarrhea.

## 2018-09-18 NOTE — PROGRESS NOTE ADULT - SUBJECTIVE AND OBJECTIVE BOX
Patient is a 68y old  Male who presents with a chief complaint of Altered MS (17 Sep 2018 05:59)    PAST MEDICAL & SURGICAL HISTORY:  Anemia, unspecified type  Hyperlipidemia, unspecified hyperlipidemia type  Diabetes mellitus of other type with complication  Hypertension, unspecified type  No significant past surgical history    YAMIL OLEA   68y    Male    BRIEF HOSPITAL COURSE:    68 year old male PMHx MRDD (unspecified type), DM2, HTN, HLD, Recent fall with Left Tibial plateau Fx, from rehab with change in mental status.  Noted in ED to be combative requiring sedation to facilitate work up.  Noted to have UTI, febrile to 102.  CXR is clear.      Lactate cleared pressors off        Review of Systems:  Unreliable MRDD         All other ROS are negative.    Allergies    No Known Allergies    Intolerances      Weight (kg): 104 (18 @ 07:15)    ICU Vital Signs Last 24 Hrs  T(C): 36.4 (17 Sep 2018 20:00), Max: 38.8 (17 Sep 2018 01:15)  T(F): 97.5 (17 Sep 2018 20:00), Max: 101.8 (17 Sep 2018 01:15)  HR: 74 (18 Sep 2018 01:00) (56 - 124)  BP: 141/64 (18 Sep 2018 01:00) (64/33 - 141/64)  BP(mean): 92 (18 Sep 2018 01:00) (62 - 98)  ABP: --  ABP(mean): --  RR: 20 (18 Sep 2018 01:00) (14 - 36)  SpO2: 95% (18 Sep 2018 01:00) (92% - 100%)    Physical Examination:    General:  NAD  smiling     HEENT: no JVD     PULM:  bilateral BS     CVS:  s1 s2 reg    ABD: soft NT     EXT: no edema  L knee brace     SKIN:  warm    Neuro: awake alert non focal MRDD          LABS:                        9.2    20.3  )-----------( 299      ( 17 Sep 2018 06:50 )             30.2         139  |  105  |  37.0<H>  ----------------------------<  141<H>  4.2   |  18.0<L>  |  1.97<H>    Ca    8.5<L>      17 Sep 2018 06:50  Phos  4.1     09-17  Mg     2.0         TPro  7.7  /  Alb  3.3  /  TBili  0.6  /  DBili  x   /  AST  28  /  ALT  23  /  AlkPhos  92        CARDIAC MARKERS ( 17 Sep 2018 06:50 )  x     / 0.11 ng/mL / 636 U/L / x     / 8.4 ng/mL  CARDIAC MARKERS ( 17 Sep 2018 01:52 )  x     / 0.11 ng/mL / 559 U/L / x     / 4.6 ng/mL      CAPILLARY BLOOD GLUCOSE      POCT Blood Glucose.: 129 mg/dL (17 Sep 2018 22:28)  POCT Blood Glucose.: 124 mg/dL (17 Sep 2018 16:39)    PT/INR - ( 17 Sep 2018 01:52 )   PT: 13.5 sec;   INR: 1.22 ratio         PTT - ( 17 Sep 2018 01:52 )  PTT:33.2 sec  Urinalysis Basic - ( 17 Sep 2018 03:04 )    Color: Jigna / Appearance: very cloudy / S.020 / pH: x  Gluc: x / Ketone: Negative  / Bili: Negative / Urobili: 1 mg/dL   Blood: x / Protein: 100 mg/dL / Nitrite: Negative   Leuk Esterase: Moderate / RBC: 11-25 /HPF / WBC 11-25   Sq Epi: x / Non Sq Epi: Occasional / Bacteria: Few    CULTURES:    Medications:  MEDICATIONS  (STANDING):  aspirin enteric coated 325 milliGRAM(s) Oral daily  atorvastatin 80 milliGRAM(s) Oral at bedtime  cefepime   IVPB 2000 milliGRAM(s) IV Intermittent every 12 hours  cefepime   IVPB      dextrose 5%. 1000 milliLiter(s) (50 mL/Hr) IV Continuous <Continuous>  dextrose 50% Injectable 12.5 Gram(s) IV Push once  dextrose 50% Injectable 25 Gram(s) IV Push once  dextrose 50% Injectable 25 Gram(s) IV Push once  insulin lispro (HumaLOG) corrective regimen sliding scale   SubCutaneous Before meals and at bedtime    MEDICATIONS  (PRN):  dextrose 40% Gel 15 Gram(s) Oral once PRN Blood Glucose LESS THAN 70 milliGRAM(s)/deciliter  glucagon  Injectable 1 milliGRAM(s) IntraMuscular once PRN Glucose LESS THAN 70 milligrams/deciliter        -16 @ 07:01  -  - @ 07:00  --------------------------------------------------------  IN: 3337 mL / OUT: 0 mL / NET: 3337 mL     @ 07:01  -   @ 01:08  --------------------------------------------------------  IN: 326.7 mL / OUT: 1450 mL / NET: -1123.3 mL        RADIOLOGY/IMAGING/ECHO    < from: Xray Chest 1 View AP/PA. (18 @ 03:31) >    Lung apices are obscured by overlying change. Shallow inspiration with   subsegmental atelectasis left lung base. There is no acute consolidation.    There are no pleural effusion or pneumothorax. Cardiac and mediastinal   structures are within normal limits.    There are degenerative changes.       Assessment/Plan:      68 year old male PMHx MRDD (unspecified type), DM2, HTN, HLD, admit yesterday with AMS fever lactic acidosis BOUCHRA leukocytosis and shock due to UTI.  No + cultures as of yet     Lactate has cleared pressors off  approx 12 hrs. HD stable     No longer requires ICU care will transfer to the hospitalist service.  Regular bed.      Cefepime  for now ( was in [facility] facility) follow culture data.     Non oliguric BOUCHRA likely due to septic ATN.  IV fluids for now.      + trop in setting of shock.   On ASA will order echo  likely demand  trop leak.  Initial EKG without acute ischemic changes will repeat.

## 2018-09-18 NOTE — CHART NOTE - NSCHARTNOTEFT_GEN_A_CORE
68 year old male with PMH MRDD, HTN, DMT2, dyslipidemia and Left tibial plateau fracture was sent from Rehab with AMS and fever. Sepsis with shock present at admission requiring pressors and admitted to MICU. BOUCHRA secondary to ATN present at admission.    Treated with broad spectrum antibiotics for sepsis, secondary to UTI. Weaned off pressors.     Patient was seen, examined at bedside earlier today.  No longer hypotensive or febrile.  Leukocytosis resolved. BOUCHRA resolved. Urine and blood cultures positive of Klebsiella pneumonia (MDR).    Transferred to Hospitalist service - will follow.

## 2018-09-19 DIAGNOSIS — R78.81 BACTEREMIA: ICD-10-CM

## 2018-09-19 DIAGNOSIS — E13.8 OTHER SPECIFIED DIABETES MELLITUS WITH UNSPECIFIED COMPLICATIONS: ICD-10-CM

## 2018-09-19 LAB
-  AMIKACIN: SIGNIFICANT CHANGE UP
-  AMIKACIN: SIGNIFICANT CHANGE UP
-  AMPICILLIN/SULBACTAM: SIGNIFICANT CHANGE UP
-  AMPICILLIN/SULBACTAM: SIGNIFICANT CHANGE UP
-  AMPICILLIN: SIGNIFICANT CHANGE UP
-  AMPICILLIN: SIGNIFICANT CHANGE UP
-  AZTREONAM: SIGNIFICANT CHANGE UP
-  AZTREONAM: SIGNIFICANT CHANGE UP
-  CEFAZOLIN: SIGNIFICANT CHANGE UP
-  CEFAZOLIN: SIGNIFICANT CHANGE UP
-  CEFEPIME: SIGNIFICANT CHANGE UP
-  CEFEPIME: SIGNIFICANT CHANGE UP
-  CEFOXITIN: SIGNIFICANT CHANGE UP
-  CEFOXITIN: SIGNIFICANT CHANGE UP
-  CEFTRIAXONE: SIGNIFICANT CHANGE UP
-  CEFTRIAXONE: SIGNIFICANT CHANGE UP
-  CIPROFLOXACIN: SIGNIFICANT CHANGE UP
-  CIPROFLOXACIN: SIGNIFICANT CHANGE UP
-  ERTAPENEM: SIGNIFICANT CHANGE UP
-  ERTAPENEM: SIGNIFICANT CHANGE UP
-  GENTAMICIN: SIGNIFICANT CHANGE UP
-  GENTAMICIN: SIGNIFICANT CHANGE UP
-  IMIPENEM: SIGNIFICANT CHANGE UP
-  IMIPENEM: SIGNIFICANT CHANGE UP
-  LEVOFLOXACIN: SIGNIFICANT CHANGE UP
-  LEVOFLOXACIN: SIGNIFICANT CHANGE UP
-  MEROPENEM: SIGNIFICANT CHANGE UP
-  MEROPENEM: SIGNIFICANT CHANGE UP
-  NITROFURANTOIN: SIGNIFICANT CHANGE UP
-  PIPERACILLIN/TAZOBACTAM: SIGNIFICANT CHANGE UP
-  PIPERACILLIN/TAZOBACTAM: SIGNIFICANT CHANGE UP
-  TIGECYCLINE: SIGNIFICANT CHANGE UP
-  TIGECYCLINE: SIGNIFICANT CHANGE UP
-  TOBRAMYCIN: SIGNIFICANT CHANGE UP
-  TOBRAMYCIN: SIGNIFICANT CHANGE UP
-  TRIMETHOPRIM/SULFAMETHOXAZOLE: SIGNIFICANT CHANGE UP
-  TRIMETHOPRIM/SULFAMETHOXAZOLE: SIGNIFICANT CHANGE UP
CULTURE RESULTS: SIGNIFICANT CHANGE UP
GLUCOSE BLDC GLUCOMTR-MCNC: 104 MG/DL — HIGH (ref 70–99)
GLUCOSE BLDC GLUCOMTR-MCNC: 111 MG/DL — HIGH (ref 70–99)
GLUCOSE BLDC GLUCOMTR-MCNC: 131 MG/DL — HIGH (ref 70–99)
GLUCOSE BLDC GLUCOMTR-MCNC: 187 MG/DL — HIGH (ref 70–99)
METHOD TYPE: SIGNIFICANT CHANGE UP
METHOD TYPE: SIGNIFICANT CHANGE UP
ORGANISM # SPEC MICROSCOPIC CNT: SIGNIFICANT CHANGE UP
ORGANISM # SPEC MICROSCOPIC CNT: SIGNIFICANT CHANGE UP
SPECIMEN SOURCE: SIGNIFICANT CHANGE UP

## 2018-09-19 PROCEDURE — 99233 SBSQ HOSP IP/OBS HIGH 50: CPT

## 2018-09-19 PROCEDURE — 99223 1ST HOSP IP/OBS HIGH 75: CPT

## 2018-09-19 RX ORDER — TIGECYCLINE 50 MG/5ML
INJECTION, POWDER, LYOPHILIZED, FOR SOLUTION INTRAVENOUS
Qty: 0 | Refills: 0 | Status: DISCONTINUED | OUTPATIENT
Start: 2018-09-19 | End: 2018-09-19

## 2018-09-19 RX ORDER — TIGECYCLINE 50 MG/5ML
100 INJECTION, POWDER, LYOPHILIZED, FOR SOLUTION INTRAVENOUS ONCE
Qty: 0 | Refills: 0 | Status: DISCONTINUED | OUTPATIENT
Start: 2018-09-19 | End: 2018-09-19

## 2018-09-19 RX ORDER — POLYMYXIN B SULFATE 500000 [USP'U]/1
1000000 INJECTION, POWDER, LYOPHILIZED, FOR SOLUTION INTRAMUSCULAR; INTRATHECAL; INTRAVENOUS; OPHTHALMIC EVERY 12 HOURS
Qty: 0 | Refills: 0 | Status: DISCONTINUED | OUTPATIENT
Start: 2018-09-19 | End: 2018-09-23

## 2018-09-19 RX ORDER — TIGECYCLINE 50 MG/5ML
50 INJECTION, POWDER, LYOPHILIZED, FOR SOLUTION INTRAVENOUS EVERY 12 HOURS
Qty: 0 | Refills: 0 | Status: DISCONTINUED | OUTPATIENT
Start: 2018-09-19 | End: 2018-09-19

## 2018-09-19 RX ORDER — HALOPERIDOL DECANOATE 100 MG/ML
5 INJECTION INTRAMUSCULAR ONCE
Qty: 0 | Refills: 0 | Status: COMPLETED | OUTPATIENT
Start: 2018-09-19 | End: 2018-09-19

## 2018-09-19 RX ORDER — ACETAMINOPHEN 500 MG
650 TABLET ORAL ONCE
Qty: 0 | Refills: 0 | Status: COMPLETED | OUTPATIENT
Start: 2018-09-19 | End: 2018-09-19

## 2018-09-19 RX ADMIN — Medication 1000 MILLIGRAM(S): at 01:30

## 2018-09-19 RX ADMIN — HALOPERIDOL DECANOATE 5 MILLIGRAM(S): 100 INJECTION INTRAMUSCULAR at 11:08

## 2018-09-19 RX ADMIN — Medication 2: at 18:20

## 2018-09-19 RX ADMIN — Medication 400 MILLIGRAM(S): at 01:21

## 2018-09-19 RX ADMIN — POLYMYXIN B SULFATE 500 UNIT(S): 500000 INJECTION, POWDER, LYOPHILIZED, FOR SOLUTION INTRAMUSCULAR; INTRATHECAL; INTRAVENOUS; OPHTHALMIC at 17:13

## 2018-09-19 NOTE — CONSULT NOTE ADULT - SUBJECTIVE AND OBJECTIVE BOX
[facility]  INFECTIOUS DISEASES AND INTERNAL MEDICINE at [place]  =======================================================  José Luis Walton MD  Diplomates American Board of Internal Medicine and Infectious Diseases  =======================================================      [id number]  YAMILDENZEL OLEA     CC: Patient is a 68y old  Male who presents with a chief complaint of Altered MS (18 Sep 2018 04:48)    Consult: Carbapenem resistant Klebsiella bacteremia and UTI    68 year old male with PMH MRDD, HTN, DMT2, dyslipidemia, recent Left tibial plateau fracture was sent from Rehab with AMS and fever. Sepsis with shock present at admission requiring pressors and admitted to MICU. BOUCHRA secondary to ATN present at admission. Treated with broad spectrum antibiotics for sepsis, secondary to UTI. Now weaned off pressors and downgraded to floors. Found to have UTI and Bacteremia with carbapenem resistant klebsiella.     Poor historian given MRDD. Niece at bedside. Patient not remember why he came to hospital. Denies fevers, chills, pain at this time.      REVIEW OF SYSTEMS:  Unable to obtain full ROS due to poor historian from MRDD    Past Medical & Surgical Hx:  Anemia, unspecified type  Hyperlipidemia, unspecified hyperlipidemia type  Diabetes mellitus of other type with complication  Hypertension, unspecified type  No significant past surgical history      Problem List:  HEALTH ISSUES - PROBLEM Dx:  BOUCHRA (acute kidney injury): BOUCHRA (acute kidney injury)  Urinary tract infection without hematuria, site unspecified: Urinary tract infection without hematuria, site unspecified  Septic shock: Septic shock  Encephalopathy: Encephalopathy  Altered mental status, unspecified altered mental status type: Altered mental status, unspecified altered mental status type      Allergies  No Known Allergies    ANTIBIOTICS:   polymyxin B IVPB [id number] Unit(s) IV Intermittent every 12 hours       I&O's Detail    18 Sep 2018 07:01  -  19 Sep 2018 07:00  --------------------------------------------------------  IN:    multiple electrolytes Injection Type 1: 500 mL    Oral Fluid: 480 mL  Total IN: 980 mL    OUT:    Voided: 2325 mL  Total OUT: 2325 mL    Total NET: -1345 mL          Physical Exam:  Vital Signs Last 24 Hrs  T(C): 37.4 (19 Sep 2018 15:33), Max: 38.9 (19 Sep 2018 03:00)  T(F): 99.3 (19 Sep 2018 15:33), Max: 102 (19 Sep 2018 03:00)  HR: 87 (19 Sep 2018 15:33) (84 - 107)  BP: 160/76 (19 Sep 2018 15:33) (112/60 - 160/76)  BP(mean): --  RR: 20 (19 Sep 2018 15:33) (18 - 20)  SpO2: 94% (19 Sep 2018 15:33) (94% - 98%)    GEN: NAD, laying in bed  HEENT: normocephalic and atraumatic. EOMI. PERRL.    NECK: Supple.   LUNGS: Clear to auscultation.  HEART: Regular rate and rhythm without murmur.  ABDOMEN: Soft, supra pubic tenderness. No rebound. No guarding.  Positive bowel sounds.    : No CVA tenderness  EXTREMITIES: Without any edema.  MSK: No joint swelling. LLE fixed in splint  NEUROLOGIC: Awake and alert. Unable to assess orientation. no gross CN deficits.   PSYCHIATRIC: Appropriate affect .  SKIN: No ulceration or induration present.      Labs:  09-18    139  |  105  |  24.0<H>  ----------------------------<  110  3.8   |  21.0<L>  |  1.01    Ca    8.8      18 Sep 2018 03:05  Phos  2.6     09-18  Mg     2.1     09-18    TPro  6.7  /  Alb  2.7<L>  /  TBili  0.4  /  DBili  x   /  AST  28  /  ALT  23  /  AlkPhos  81  09-18                          9.4    10.4  )-----------( 283      ( 18 Sep 2018 03:05 )             30.3       LIVER FUNCTIONS - ( 18 Sep 2018 03:05 )  Alb: 2.7 g/dL / Pro: 6.7 g/dL / ALK PHOS: 81 U/L / ALT: 23 U/L / AST: 28 U/L / GGT: x           RECENT CULTURES:  09-17 @ 03:04 .Urine Catheterized Klepne MDRO    50,000 - 99,000 CFU/mL Klebsiella pneumoniae (Carbapenem Resistant)    09-17 @ 01:55 .Blood Blood     No growth at 48 hours    09-17 @ 01:53 .Blood Blood Klepne MDRO  Blood Culture PCR    Growth in aerobic bottle: Klebsiella pneumoniae/oxytoca (Carbapenem  Resistant)  Aerobic Bottle: 22.04 Hours to positivity  Anaerobic Bottle: No growth to date  .  ***Blood Panel PCR results on this specimen are available  approximately 3 hours after the Gram stain result.***  Gram stain, PCR, and/or culture results may not always  correspond due to difference in methodologies.  ************************************************************  This PCR assay was performed using [employer].  The following targets are tested for: Enterococcus,  vancomycin resistant enterococci, Listeria monocytogenes,  coagulase negative staphylococci, S. aureus,  methicillin resistant S. aureus, Streptococcus agalactiae  (Group B), S. pneumoniae, S. pyogenes (Group A),  Acinetobacter baumannii, Enterobacter cloacae, E. coli,  Klebsiella oxytoca, K. pneumoniae, Proteus sp.,  Serratia marcescens, Haemophilus influenzae,  Neisseria meningitidis, Pseudomonas aeruginosa, Candida  albicans, C. glabrata, C krusei, C parapsilosis,  C. tropicalis and the KPC resistance gene.  "Due to technical problems, Proteus sp. will Not be reported as part of  the BCID panel until further notice"  .    RADIOLOGY:  EXAM:  XR CHEST AP OR PA 1V                        PROCEDURE DATE:  09/17/2018      INTERPRETATION:    INDICATION: Cough and shortness of breath.  Single frontal view of chest dated 9/17/2018 3:31 AM, compared to prior   study of 8/26/2018.  FINDINGS /   IMPRESSION:   Lung apices are obscured by overlying change. Shallow inspiration with   subsegmental atelectasis left lung base. There is no acute consolidation.    There are no pleural effusion or pneumothorax. Cardiac and mediastinal   structures are within normal limits.    There are degenerative changes. [facility]  INFECTIOUS DISEASES AND INTERNAL MEDICINE at [place]  =======================================================  José Luis Walton MD  Diplomates American Board of Internal Medicine and Infectious Diseases  =======================================================      [id number]  YAMILDENZEL OLEA     CC: Patient is a 68y old  Male who presents with a chief complaint of Altered MS (18 Sep 2018 04:48)    Consult: Carbapenem resistant Klebsiella bacteremia and UTI    68 year old male with PMH MRDD, HTN, DMT2, dyslipidemia, recent Left tibial plateau fracture was sent from Rehab with AMS and fever. Sepsis with shock present at admission requiring pressors and admitted to MICU. BOUCHRA secondary to ATN present at admission. Treated with broad spectrum antibiotics for sepsis, secondary to UTI. Now weaned off pressors and downgraded to floors. Found to have UTI and Bacteremia with carbapenem resistant klebsiella.     Poor historian given MRDD. Niece at bedside. Patient not remember why he came to hospital. Denies fevers, chills, pain at this time.      REVIEW OF SYSTEMS:  Unable to obtain full ROS due to poor historian from MRDD      Past Medical & Surgical Hx:  Anemia, unspecified type  Hyperlipidemia, unspecified hyperlipidemia type  Diabetes mellitus of other type with complication  Hypertension, unspecified type  No significant past surgical history      Allergies  No Known Allergies    ANTIBIOTICS:   polymyxin B IVPB [id number] Unit(s) IV Intermittent every 12 hours     Physical Exam:  Vital Signs Last 24 Hrs  T(C): 37.4 (19 Sep 2018 15:33), Max: 38.9 (19 Sep 2018 03:00)  T(F): 99.3 (19 Sep 2018 15:33), Max: 102 (19 Sep 2018 03:00)  HR: 87 (19 Sep 2018 15:33) (84 - 107)  BP: 160/76 (19 Sep 2018 15:33) (112/60 - 160/76)  RR: 20 (19 Sep 2018 15:33) (18 - 20)  SpO2: 94% (19 Sep 2018 15:33) (94% - 98%)    GEN: NAD, laying in bed  HEENT: normocephalic and atraumatic. EOMI. PERRL.    NECK: Supple.   LUNGS: Clear to auscultation.  HEART: Regular rate and rhythm without murmur.  ABDOMEN: Soft, supra pubic tenderness. No rebound. No guarding.  Positive bowel sounds.    : No CVA tenderness  EXTREMITIES: Without any edema.  MSK: No joint swelling. LLE fixed in splint  NEUROLOGIC: Awake and alert. Unable to assess orientation. no gross CN deficits.   PSYCHIATRIC: Appropriate affect .  SKIN: No ulceration or induration present.      Labs:  09-18    139  |  105  |  24.0<H>  ----------------------------<  110  3.8   |  21.0<L>  |  1.01    Ca    8.8      18 Sep 2018 03:05  Phos  2.6     09-18  Mg     2.1     09-18    TPro  6.7  /  Alb  2.7<L>  /  TBili  0.4  /  DBili  x   /  AST  28  /  ALT  23  /  AlkPhos  81  09-18                          9.4    10.4  )-----------( 283      ( 18 Sep 2018 03:05 )             30.3       LIVER FUNCTIONS - ( 18 Sep 2018 03:05 )  Alb: 2.7 g/dL / Pro: 6.7 g/dL / ALK PHOS: 81 U/L / ALT: 23 U/L / AST: 28 U/L / GGT: x           RECENT CULTURES:  09-17 @ 03:04 .Urine Catheterized Klepne MDRO    50,000 - 99,000 CFU/mL Klebsiella pneumoniae (Carbapenem Resistant)      09-17 @ 01:55 .Blood Blood     No growth at 48 hours      09-17 @ 01:53 .Blood Blood Klepne MDRO  Blood Culture PCR    Growth in aerobic bottle: Klebsiella pneumoniae/oxytoca (Carbapenem  Resistant)  Aerobic Bottle: 22.04 Hours to positivity  Anaerobic Bottle: No growth to date  ***Blood Panel PCR results on this specimen are available  approximately 3 hours after the Gram stain result.***  Gram stain, PCR, and/or culture results may not always  correspond due to difference in methodologies.  ************************************************************  This PCR assay was performed using [employer].  The following targets are tested for: Enterococcus,  vancomycin resistant enterococci, Listeria monocytogenes,  coagulase negative staphylococci, S. aureus,  methicillin resistant S. aureus, Streptococcus agalactiae  (Group B), S. pneumoniae, S. pyogenes (Group A),  Acinetobacter baumannii, Enterobacter cloacae, E. coli,  Klebsiella oxytoca, K. pneumoniae, Proteus sp.,  Serratia marcescens, Haemophilus influenzae,  Neisseria meningitidis, Pseudomonas aeruginosa, Candida  albicans, C. glabrata, C krusei, C parapsilosis,  C. tropicalis and the KPC resistance gene.  "Due to technical problems, Proteus sp. will Not be reported as part of  the BCID panel until further notice"      RADIOLOGY:  EXAM:  XR CHEST AP OR PA 1V                        PROCEDURE DATE:  09/17/2018      INTERPRETATION:    INDICATION: Cough and shortness of breath.  Single frontal view of chest dated 9/17/2018 3:31 AM, compared to prior   study of 8/26/2018.  FINDINGS /   IMPRESSION:   Lung apices are obscured by overlying change. Shallow inspiration with   subsegmental atelectasis left lung base. There is no acute consolidation.    There are no pleural effusion or pneumothorax. Cardiac and mediastinal   structures are within normal limits.    There are degenerative changes.

## 2018-09-19 NOTE — CONSULT NOTE ADULT - PROBLEM SELECTOR RECOMMENDATION 9
Found to have Carbepenem resistent Klebsiella in Blood culture 1 bottle  Was being treated with Tigecycline that is sensitive however does not provide coverage for bacteremia  Switching to Polymyxin and Avycaz   Will check sensitivities for Polymoxin and Avycaz   Ordering blood cultures for AM  Leukocytosis trending down  Last fever at 3am today, continue to monitor fevers Found to have Carbepenem resistent Klebsiella in Blood culture 1 bottle  Was being treated with Tigecycline that is sensitive however does not provide coverage for bacteremia  Switching to Polymyxin and Avycaz   Will check sensitivities for Polymoxin and Avycaz   Ordering blood cultures for AM  Leukocytosis trending down  Last fever at 3am today, continue to monitor fevers  Will need to monitor renal function daily

## 2018-09-19 NOTE — PROGRESS NOTE ADULT - SUBJECTIVE AND OBJECTIVE BOX
CC: Altered MS     HPI:  68 year old male PMHx MRDD (unspecified type), DM2, HTN, HLD, Recent fall with Left Tibial plateau Fx, from rehab with change in mental status.  Noted in ED to be combative requiring sedation to facilitate work up.  Noted to have UTI, febrile to 102.  CXR is clear. (17 Sep 2018 05:59)    INTERVAL HPI/OVERNIGHT EVENTS: Patient seen and examined lying in bed.  Patient confused and unable to answer ROS with RN translating.  Patient appears agitated and trying to climb out of bed.      Vital Signs Last 24 Hrs  T(C): 37.4 (19 Sep 2018 15:33), Max: 38.9 (19 Sep 2018 03:00)  T(F): 99.3 (19 Sep 2018 15:33), Max: 102 (19 Sep 2018 03:00)  HR: 87 (19 Sep 2018 15:33) (84 - 107)  BP: 160/76 (19 Sep 2018 15:33) (112/60 - 160/76)  BP(mean): --  RR: 20 (19 Sep 2018 15:33) (18 - 20)  SpO2: 94% (19 Sep 2018 15:33) (94% - 98%)  I&O's Detail    18 Sep 2018 07:01  -  19 Sep 2018 07:00  --------------------------------------------------------  IN:    multiple electrolytes Injection Type 1: 500 mL    Oral Fluid: 480 mL  Total IN: 980 mL    OUT:    Voided: 2325 mL  Total OUT: 2325 mL    Total NET: -1345 mL      PHYSICAL EXAM:  GENERAL: NAD, agitated  HEAD:  Atraumatic, Normocephalic  NECK: Supple, No JVD, Normal thyroid  NERVOUS SYSTEM:  Alert, Disoriented, Motor Strength 5/5 B/L upper and lower extremities  CHEST/LUNG: Clear to auscultation bilaterally; No rales, rhonchi, wheezing, or rubs  HEART: Regular rate and rhythm; No murmurs, rubs, or gallops  ABDOMEN: Soft, Nontender, Nondistended; Bowel sounds present  EXTREMITIES:  2+ Peripheral Pulses, No clubbing, cyanosis, or edema; LLE with brace in place                                9.4    10.4  )-----------( 283      ( 18 Sep 2018 03:05 )             30.3     18 Sep 2018 03:05    139    |  105    |  24.0   ----------------------------<  110    3.8     |  21.0   |  1.01     Ca    8.8        18 Sep 2018 03:05  Phos  2.6       18 Sep 2018 03:05  Mg     2.1       18 Sep 2018 03:05    TPro  6.7    /  Alb  2.7    /  TBili  0.4    /  DBili  x      /  AST  28     /  ALT  23     /  AlkPhos  81     18 Sep 2018 03:05      CAPILLARY BLOOD GLUCOSE  POCT Blood Glucose.: 131 mg/dL (19 Sep 2018 13:23)  POCT Blood Glucose.: 104 mg/dL (19 Sep 2018 06:47)  POCT Blood Glucose.: 109 mg/dL (18 Sep 2018 21:49)  POCT Blood Glucose.: 137 mg/dL (18 Sep 2018 17:23)    LIVER FUNCTIONS - ( 18 Sep 2018 03:05 )  Alb: 2.7 g/dL / Pro: 6.7 g/dL / ALK PHOS: 81 U/L / ALT: 23 U/L / AST: 28 U/L / GGT: x             Hemoglobin A1C, Whole Blood: 6.6 % (09-18-18 @ 03:11)    MEDICATIONS  (STANDING):  aspirin enteric coated 325 milliGRAM(s) Oral daily  atorvastatin 80 milliGRAM(s) Oral at bedtime  dextrose 5%. 1000 milliLiter(s) (50 mL/Hr) IV Continuous <Continuous>  dextrose 50% Injectable 12.5 Gram(s) IV Push once  dextrose 50% Injectable 25 Gram(s) IV Push once  dextrose 50% Injectable 25 Gram(s) IV Push once  insulin lispro (HumaLOG) corrective regimen sliding scale   SubCutaneous Before meals and at bedtime  multiple electrolytes Injection Type 1 1000 milliLiter(s) (100 mL/Hr) IV Continuous <Continuous>  polymyxin B IVPB [id number] Unit(s) IV Intermittent every 12 hours    MEDICATIONS  (PRN):  dextrose 40% Gel 15 Gram(s) Oral once PRN Blood Glucose LESS THAN 70 milliGRAM(s)/deciliter  glucagon  Injectable 1 milliGRAM(s) IntraMuscular once PRN Glucose LESS THAN 70 milligrams/deciliter

## 2018-09-19 NOTE — CONSULT NOTE ADULT - ASSESSMENT
68 year old male with PMH MRDD, HTN, DMT2, dyslipidemia, recent Left tibial plateau fracture was sent from Rehab with AMS and fever. Sepsis with shock present at admission requiring pressors and admitted to MICU. BOUCHRA secondary to ATN present at admission. Treated with broad spectrum antibiotics for sepsis, secondary to UTI. Now weaned off pressors and downgraded to floors. Found to have UTI and Bacteremia with carbapenem resistant klebsiella.

## 2018-09-20 DIAGNOSIS — A49.8 OTHER BACTERIAL INFECTIONS OF UNSPECIFIED SITE: ICD-10-CM

## 2018-09-20 DIAGNOSIS — A41.4 SEPSIS DUE TO ANAEROBES: ICD-10-CM

## 2018-09-20 DIAGNOSIS — N10 ACUTE PYELONEPHRITIS: ICD-10-CM

## 2018-09-20 LAB
ALBUMIN SERPL ELPH-MCNC: 2.6 G/DL — LOW (ref 3.3–5.2)
ALP SERPL-CCNC: 79 U/L — SIGNIFICANT CHANGE UP (ref 40–120)
ALT FLD-CCNC: 19 U/L — SIGNIFICANT CHANGE UP
ANION GAP SERPL CALC-SCNC: 11 MMOL/L — SIGNIFICANT CHANGE UP (ref 5–17)
AST SERPL-CCNC: 22 U/L — SIGNIFICANT CHANGE UP
BILIRUB SERPL-MCNC: 0.3 MG/DL — LOW (ref 0.4–2)
BUN SERPL-MCNC: 14 MG/DL — SIGNIFICANT CHANGE UP (ref 8–20)
CALCIUM SERPL-MCNC: 8.8 MG/DL — SIGNIFICANT CHANGE UP (ref 8.6–10.2)
CHLORIDE SERPL-SCNC: 101 MMOL/L — SIGNIFICANT CHANGE UP (ref 98–107)
CO2 SERPL-SCNC: 27 MMOL/L — SIGNIFICANT CHANGE UP (ref 22–29)
CREAT SERPL-MCNC: 1.07 MG/DL — SIGNIFICANT CHANGE UP (ref 0.5–1.3)
GLUCOSE BLDC GLUCOMTR-MCNC: 116 MG/DL — HIGH (ref 70–99)
GLUCOSE BLDC GLUCOMTR-MCNC: 117 MG/DL — HIGH (ref 70–99)
GLUCOSE BLDC GLUCOMTR-MCNC: 152 MG/DL — HIGH (ref 70–99)
GLUCOSE BLDC GLUCOMTR-MCNC: 197 MG/DL — HIGH (ref 70–99)
GLUCOSE SERPL-MCNC: 169 MG/DL — HIGH (ref 70–115)
HCT VFR BLD CALC: 29.4 % — LOW (ref 42–52)
HGB BLD-MCNC: 9.2 G/DL — LOW (ref 14–18)
MCHC RBC-ENTMCNC: 27.9 PG — SIGNIFICANT CHANGE UP (ref 27–31)
MCHC RBC-ENTMCNC: 31.3 G/DL — LOW (ref 32–36)
MCV RBC AUTO: 89.1 FL — SIGNIFICANT CHANGE UP (ref 80–94)
PLATELET # BLD AUTO: 209 K/UL — SIGNIFICANT CHANGE UP (ref 150–400)
POTASSIUM SERPL-MCNC: 3.5 MMOL/L — SIGNIFICANT CHANGE UP (ref 3.5–5.3)
POTASSIUM SERPL-SCNC: 3.5 MMOL/L — SIGNIFICANT CHANGE UP (ref 3.5–5.3)
PROT SERPL-MCNC: 6.5 G/DL — LOW (ref 6.6–8.7)
RBC # BLD: 3.3 M/UL — LOW (ref 4.6–6.2)
RBC # FLD: 16.8 % — HIGH (ref 11–15.6)
SODIUM SERPL-SCNC: 139 MMOL/L — SIGNIFICANT CHANGE UP (ref 135–145)
WBC # BLD: 6.3 K/UL — SIGNIFICANT CHANGE UP (ref 4.8–10.8)
WBC # FLD AUTO: 6.3 K/UL — SIGNIFICANT CHANGE UP (ref 4.8–10.8)

## 2018-09-20 PROCEDURE — 74176 CT ABD & PELVIS W/O CONTRAST: CPT | Mod: 26

## 2018-09-20 PROCEDURE — 99233 SBSQ HOSP IP/OBS HIGH 50: CPT

## 2018-09-20 PROCEDURE — 73560 X-RAY EXAM OF KNEE 1 OR 2: CPT | Mod: 26,LT

## 2018-09-20 RX ORDER — ENOXAPARIN SODIUM 100 MG/ML
40 INJECTION SUBCUTANEOUS DAILY
Qty: 0 | Refills: 0 | Status: DISCONTINUED | OUTPATIENT
Start: 2018-09-20 | End: 2018-09-27

## 2018-09-20 RX ORDER — SACCHAROMYCES BOULARDII 250 MG
250 POWDER IN PACKET (EA) ORAL
Qty: 0 | Refills: 0 | Status: DISCONTINUED | OUTPATIENT
Start: 2018-09-20 | End: 2018-09-26

## 2018-09-20 RX ORDER — ACETAMINOPHEN 500 MG
650 TABLET ORAL ONCE
Qty: 0 | Refills: 0 | Status: COMPLETED | OUTPATIENT
Start: 2018-09-20 | End: 2018-09-20

## 2018-09-20 RX ADMIN — ATORVASTATIN CALCIUM 80 MILLIGRAM(S): 80 TABLET, FILM COATED ORAL at 00:05

## 2018-09-20 RX ADMIN — ENOXAPARIN SODIUM 40 MILLIGRAM(S): 100 INJECTION SUBCUTANEOUS at 14:35

## 2018-09-20 RX ADMIN — Medication 325 MILLIGRAM(S): at 14:35

## 2018-09-20 RX ADMIN — Medication 650 MILLIGRAM(S): at 02:51

## 2018-09-20 RX ADMIN — POLYMYXIN B SULFATE 500 UNIT(S): 500000 INJECTION, POWDER, LYOPHILIZED, FOR SOLUTION INTRAMUSCULAR; INTRATHECAL; INTRAVENOUS; OPHTHALMIC at 05:41

## 2018-09-20 RX ADMIN — ATORVASTATIN CALCIUM 80 MILLIGRAM(S): 80 TABLET, FILM COATED ORAL at 22:30

## 2018-09-20 RX ADMIN — Medication 250 MILLIGRAM(S): at 18:22

## 2018-09-20 RX ADMIN — Medication 2: at 09:23

## 2018-09-20 RX ADMIN — POLYMYXIN B SULFATE 500 UNIT(S): 500000 INJECTION, POWDER, LYOPHILIZED, FOR SOLUTION INTRAMUSCULAR; INTRATHECAL; INTRAVENOUS; OPHTHALMIC at 18:23

## 2018-09-20 RX ADMIN — Medication 650 MILLIGRAM(S): at 03:30

## 2018-09-20 NOTE — PROGRESS NOTE ADULT - SUBJECTIVE AND OBJECTIVE BOX
Asked to see patient that is known to the service after having a Left Tibial Plateau Fracture.  Presently he is laying in bed with his extremity in a maria brace.  X-Rays taken show fracture Tibial Plateau fracture.  Reviewed X-Rays with Dr. Petty.  Patient should continue with the [place] brace and be Non-weight bearing on Left Lower Extremity.  He should follow up with Dr. Petty as out patient in 3 weeks.

## 2018-09-20 NOTE — PROGRESS NOTE ADULT - SUBJECTIVE AND OBJECTIVE BOX
CC: Altered MS/Sepsis    INTERVAL HPI/OVERNIGHT EVENTS: Patient seen and examined. Cooperative with care today. Answers questions, denies chest pain, SOB, nausea, vomiting, fever, chills.     Vital Signs Last 24 Hrs  T(C): 36.9 (20 Sep 2018 07:57), Max: 37.2 (20 Sep 2018 03:00)  T(F): 98.4 (20 Sep 2018 07:57), Max: 98.9 (20 Sep 2018 03:00)  HR: 74 (20 Sep 2018 10:07) (74 - 97)  BP: 108/54 (20 Sep 2018 10:07) (108/54 - 121/67)  BP(mean): --  RR: 18 (20 Sep 2018 10:07) (18 - 19)  SpO2: 93% (20 Sep 2018 03:00) (93% - 93%)    PHYSICAL EXAM:  GENERAL: NAD, calm  HEAD:  Atraumatic, Normocephalic  NECK: Supple, No JVD, Normal thyroid  NERVOUS SYSTEM:  Alert, Motor Strength 5/5 B/L upper and lower extremities  CHEST/LUNG: Clear to auscultation bilaterally; No rales, rhonchi, wheezing, or rubs  HEART: Regular rate and rhythm; No murmurs, rubs, or gallops  ABDOMEN: Soft, Nontender, Nondistended; Bowel sounds present  EXTREMITIES:  2+ Peripheral Pulses, No clubbing, cyanosis, or edema; LLE with brace in place, +distal pulse  I&O's Detail    19 Sep 2018 07:01  -  20 Sep 2018 07:00  --------------------------------------------------------  IN:    Solution: 500 mL    Solution: 100 mL  Total IN: 600 mL    OUT:  Total OUT: 0 mL    Total NET: 600 mL      20 Sep 2018 07:01  -  20 Sep 2018 16:02  --------------------------------------------------------  IN:  Total IN: 0 mL    OUT:    Voided: 400 mL  Total OUT: 400 mL    Total NET: -400 mL                                    9.2    6.3   )-----------( 209      ( 20 Sep 2018 07:59 )             29.4     20 Sep 2018 07:59    139    |  101    |  14.0   ----------------------------<  169    3.5     |  27.0   |  1.07     Ca    8.8        20 Sep 2018 07:59    TPro  6.5    /  Alb  2.6    /  TBili  0.3    /  DBili  x      /  AST  22     /  ALT  19     /  AlkPhos  79     20 Sep 2018 07:59      CAPILLARY BLOOD GLUCOSE      POCT Blood Glucose.: 116 mg/dL (20 Sep 2018 12:57)  POCT Blood Glucose.: 152 mg/dL (20 Sep 2018 09:20)  POCT Blood Glucose.: 111 mg/dL (19 Sep 2018 20:35)  POCT Blood Glucose.: 187 mg/dL (19 Sep 2018 18:19)    LIVER FUNCTIONS - ( 20 Sep 2018 07:59 )  Alb: 2.6 g/dL / Pro: 6.5 g/dL / ALK PHOS: 79 U/L / ALT: 19 U/L / AST: 22 U/L / GGT: x             Hemoglobin A1C, Whole Blood: 6.6 % (09-18-18 @ 03:11)    MEDICATIONS  (STANDING):  aspirin enteric coated 325 milliGRAM(s) Oral daily  atorvastatin 80 milliGRAM(s) Oral at bedtime  cefTAZidime/avibactam IVPB      cefTAZidime/avibactam IVPB 2.5 Gram(s) IV Intermittent every 8 hours  dextrose 5%. 1000 milliLiter(s) (50 mL/Hr) IV Continuous <Continuous>  dextrose 50% Injectable 12.5 Gram(s) IV Push once  dextrose 50% Injectable 25 Gram(s) IV Push once  dextrose 50% Injectable 25 Gram(s) IV Push once  enoxaparin Injectable 40 milliGRAM(s) SubCutaneous daily  insulin lispro (HumaLOG) corrective regimen sliding scale   SubCutaneous Before meals and at bedtime  multiple electrolytes Injection Type 1 1000 milliLiter(s) (100 mL/Hr) IV Continuous <Continuous>  polymyxin B IVPB [id number] Unit(s) IV Intermittent every 12 hours  saccharomyces boulardii 250 milliGRAM(s) Oral two times a day    MEDICATIONS  (PRN):  dextrose 40% Gel 15 Gram(s) Oral once PRN Blood Glucose LESS THAN 70 milliGRAM(s)/deciliter  glucagon  Injectable 1 milliGRAM(s) IntraMuscular once PRN Glucose LESS THAN 70 milligrams/deciliter      RADIOLOGY & ADDITIONAL TESTS: CC: Altered MS/Sepsis    INTERVAL HPI/OVERNIGHT EVENTS: Patient seen and examined. Cooperative with care today. Answers questions, denies chest pain, SOB, nausea, vomiting, fever, chills.     Vital Signs Last 24 Hrs  T(C): 36.9 (20 Sep 2018 07:57), Max: 37.2 (20 Sep 2018 03:00)  T(F): 98.4 (20 Sep 2018 07:57), Max: 98.9 (20 Sep 2018 03:00)  HR: 74 (20 Sep 2018 10:07) (74 - 97)  BP: 108/54 (20 Sep 2018 10:07) (108/54 - 121/67)   RR: 18 (20 Sep 2018 10:07) (18 - 19)  SpO2: 93% (20 Sep 2018 03:00) (93% - 93%)    PHYSICAL EXAM:  GENERAL: NAD, calm  HEAD:  Atraumatic, Normocephalic  NECK: Supple, No JVD, Normal thyroid  NERVOUS SYSTEM:  Alert, Motor Strength 5/5 B/L upper and lower extremities  CHEST/LUNG: Clear to auscultation bilaterally; No rales, rhonchi, wheezing, or rubs  HEART: Regular rate and rhythm; No murmurs, rubs, or gallops  ABDOMEN: Soft, Nontender, Nondistended; Bowel sounds present  EXTREMITIES:  2+ Peripheral Pulses, No clubbing, cyanosis, or edema; LLE with brace in place, +distal pulse  I&O's Detail    19 Sep 2018 07:01  -  20 Sep 2018 07:00  --------------------------------------------------------  IN:    Solution: 500 mL    Solution: 100 mL  Total IN: 600 mL    OUT:  Total OUT: 0 mL    Total NET: 600 mL      20 Sep 2018 07:01  -  20 Sep 2018 16:02  --------------------------------------------------------  IN:  Total IN: 0 mL    OUT:    Voided: 400 mL  Total OUT: 400 mL    Total NET: -400 mL                                    9.2    6.3   )-----------( 209      ( 20 Sep 2018 07:59 )             29.4     20 Sep 2018 07:59    139    |  101    |  14.0   ----------------------------<  169    3.5     |  27.0   |  1.07     Ca    8.8        20 Sep 2018 07:59    TPro  6.5    /  Alb  2.6    /  TBili  0.3    /  DBili  x      /  AST  22     /  ALT  19     /  AlkPhos  79     20 Sep 2018 07:59      CAPILLARY BLOOD GLUCOSE      POCT Blood Glucose.: 116 mg/dL (20 Sep 2018 12:57)  POCT Blood Glucose.: 152 mg/dL (20 Sep 2018 09:20)  POCT Blood Glucose.: 111 mg/dL (19 Sep 2018 20:35)  POCT Blood Glucose.: 187 mg/dL (19 Sep 2018 18:19)    LIVER FUNCTIONS - ( 20 Sep 2018 07:59 )  Alb: 2.6 g/dL / Pro: 6.5 g/dL / ALK PHOS: 79 U/L / ALT: 19 U/L / AST: 22 U/L / GGT: x             Hemoglobin A1C, Whole Blood: 6.6 % (09-18-18 @ 03:11)    MEDICATIONS  (STANDING):  aspirin enteric coated 325 milliGRAM(s) Oral daily  atorvastatin 80 milliGRAM(s) Oral at bedtime  cefTAZidime/avibactam IVPB      cefTAZidime/avibactam IVPB 2.5 Gram(s) IV Intermittent every 8 hours  dextrose 5%. 1000 milliLiter(s) (50 mL/Hr) IV Continuous <Continuous>  dextrose 50% Injectable 12.5 Gram(s) IV Push once  dextrose 50% Injectable 25 Gram(s) IV Push once  dextrose 50% Injectable 25 Gram(s) IV Push once  enoxaparin Injectable 40 milliGRAM(s) SubCutaneous daily  insulin lispro (HumaLOG) corrective regimen sliding scale   SubCutaneous Before meals and at bedtime  multiple electrolytes Injection Type 1 1000 milliLiter(s) (100 mL/Hr) IV Continuous <Continuous>  polymyxin B IVPB [id number] Unit(s) IV Intermittent every 12 hours  saccharomyces boulardii 250 milliGRAM(s) Oral two times a day    MEDICATIONS  (PRN):  dextrose 40% Gel 15 Gram(s) Oral once PRN Blood Glucose LESS THAN 70 milliGRAM(s)/deciliter  glucagon  Injectable 1 milliGRAM(s) IntraMuscular once PRN Glucose LESS THAN 70 milligrams/deciliter      RADIOLOGY & ADDITIONAL TESTS:

## 2018-09-20 NOTE — PROGRESS NOTE ADULT - SUBJECTIVE AND OBJECTIVE BOX
[facility]  INFECTIOUS DISEASES AND INTERNAL MEDICINE at [employer]  =======================================================  José Luis Walton MD  Diplomates American Board of Internal Medicine and Infectious Diseases  =======================================================    Seen with      YAMIL OLEA [id number]    Follow up: [facility] Septicemia    Patient not verbalizing  nods head no to pain  No fevers    Allergies:  No Known Allergies      Antibiotics:  cefTAZidime/avibactam IVPB 2.5 Gram(s) IV Intermittent every 8 hours  polymyxin B IVPB [id number] Unit(s) IV Intermittent every 12 hours       REVIEW OF SYSTEMS:  Unable to assess      Physical Exam:  Vital Signs Last 24 Hrs  T(C): 36.9 (20 Sep 2018 07:57), Max: 37.4 (19 Sep 2018 15:33)  T(F): 98.4 (20 Sep 2018 07:57), Max: 99.3 (19 Sep 2018 15:33)  HR: 97 (20 Sep 2018 03:00) (87 - 97)  BP: 121/67 (20 Sep 2018 03:00) (121/67 - 160/76)  RR: 19 (20 Sep 2018 03:00) (19 - 20)  SpO2: 93% (20 Sep 2018 03:00) (93% - 94%)      GEN: NAD  HEENT: normocephalic and atraumatic. EOMI. PERRL.    NECK: Supple.   LUNGS: Clear to auscultation.  HEART: Regular rate and rhythm   ABDOMEN: Soft, nontender, and nondistended.  Positive bowel sounds.    : No CVA tenderness  EXTREMITIES: Left leg in immobilizer   MSK: no joint swelling  NEUROLOGIC: Awake, not verbalizing   PSYCHIATRIC: Unable to assess   SKIN: No Rash      Labs:  09-20    139  |  101  |  14.0  ----------------------------<  169<H>  3.5   |  27.0  |  1.07    Ca    8.8      20 Sep 2018 07:59    TPro  6.5<L>  /  Alb  2.6<L>  /  TBili  0.3<L>  /  DBili  x   /  AST  22  /  ALT  19  /  AlkPhos  79  09-20               9.2    6.3   )-----------( 209      ( 20 Sep 2018 07:59 )             29.4       LIVER FUNCTIONS - ( 20 Sep 2018 07:59 )  Alb: 2.6 g/dL / Pro: 6.5 g/dL / ALK PHOS: 79 U/L / ALT: 19 U/L / AST: 22 U/L / GGT: x             RECENT CULTURES:  09-17 @ 03:04 .Urine Catheterized Klepne MDRO    50,000 - 99,000 CFU/mL Klebsiella pneumoniae (Carbapenem Resistant)      09-17 @ 01:55 .Blood Blood     No growth at 48 hours      09-17 @ 01:53 .Blood Blood Klepne MDRO  Blood Culture PCR    Growth in aerobic bottle: Klebsiella pneumoniae (Carbapenem Resistant)  Aerobic Bottle: 22.04 Hours to positivity  Anaerobic Bottle: No growth to date  ***Blood Panel PCR results on this specimen are available  approximately 3 hours after the Gram stain result.***  Gram stain, PCR, and/or culture results may not always  correspond due to difference in methodologies.  ************************************************************  This PCR assay was performed using [employer].  The following targets are tested for: Enterococcus,  vancomycin resistant enterococci, Listeria monocytogenes,  coagulase negative staphylococci, S. aureus,  methicillin resistant S. aureus, Streptococcus agalactiae  (Group B), S. pneumoniae, S. pyogenes (Group A),  Acinetobacter baumannii, Enterobacter cloacae, E. coli,  Klebsiella oxytoca, K. pneumoniae, Proteus sp.,  Serratia marcescens, Haemophilus influenzae,  Neisseria meningitidis, Pseudomonas aeruginosa, Candida  albicans, C. glabrata, C krusei, C parapsilosis,  C. tropicalis and the KPC resistance gene.  "Due to technical problems, Proteus sp. will Not be reported as part of  the BCID panel until further notice"

## 2018-09-21 LAB
-  POLYMYXIN B: 0.5 — SIGNIFICANT CHANGE UP
ANION GAP SERPL CALC-SCNC: 11 MMOL/L — SIGNIFICANT CHANGE UP (ref 5–17)
BUN SERPL-MCNC: 19 MG/DL — SIGNIFICANT CHANGE UP (ref 8–20)
CALCIUM SERPL-MCNC: 8.9 MG/DL — SIGNIFICANT CHANGE UP (ref 8.6–10.2)
CHLORIDE SERPL-SCNC: 99 MMOL/L — SIGNIFICANT CHANGE UP (ref 98–107)
CO2 SERPL-SCNC: 27 MMOL/L — SIGNIFICANT CHANGE UP (ref 22–29)
CREAT SERPL-MCNC: 0.97 MG/DL — SIGNIFICANT CHANGE UP (ref 0.5–1.3)
GLUCOSE BLDC GLUCOMTR-MCNC: 132 MG/DL — HIGH (ref 70–99)
GLUCOSE BLDC GLUCOMTR-MCNC: 141 MG/DL — HIGH (ref 70–99)
GLUCOSE BLDC GLUCOMTR-MCNC: 150 MG/DL — HIGH (ref 70–99)
GLUCOSE BLDC GLUCOMTR-MCNC: 158 MG/DL — HIGH (ref 70–99)
GLUCOSE SERPL-MCNC: 162 MG/DL — HIGH (ref 70–115)
HCT VFR BLD CALC: 31.2 % — LOW (ref 42–52)
HGB BLD-MCNC: 9.5 G/DL — LOW (ref 14–18)
MAGNESIUM SERPL-MCNC: 1.8 MG/DL — SIGNIFICANT CHANGE UP (ref 1.6–2.6)
MCHC RBC-ENTMCNC: 27.5 PG — SIGNIFICANT CHANGE UP (ref 27–31)
MCHC RBC-ENTMCNC: 30.4 G/DL — LOW (ref 32–36)
MCV RBC AUTO: 90.4 FL — SIGNIFICANT CHANGE UP (ref 80–94)
METHOD TYPE: SIGNIFICANT CHANGE UP
PHOSPHATE SERPL-MCNC: 3.3 MG/DL — SIGNIFICANT CHANGE UP (ref 2.4–4.7)
PLATELET # BLD AUTO: 189 K/UL — SIGNIFICANT CHANGE UP (ref 150–400)
POTASSIUM SERPL-MCNC: 3.7 MMOL/L — SIGNIFICANT CHANGE UP (ref 3.5–5.3)
POTASSIUM SERPL-SCNC: 3.7 MMOL/L — SIGNIFICANT CHANGE UP (ref 3.5–5.3)
RBC # BLD: 3.45 M/UL — LOW (ref 4.6–6.2)
RBC # FLD: 17.2 % — HIGH (ref 11–15.6)
SODIUM SERPL-SCNC: 137 MMOL/L — SIGNIFICANT CHANGE UP (ref 135–145)
WBC # BLD: 6.5 K/UL — SIGNIFICANT CHANGE UP (ref 4.8–10.8)
WBC # FLD AUTO: 6.5 K/UL — SIGNIFICANT CHANGE UP (ref 4.8–10.8)

## 2018-09-21 PROCEDURE — 99233 SBSQ HOSP IP/OBS HIGH 50: CPT

## 2018-09-21 RX ADMIN — Medication 250 MILLIGRAM(S): at 18:57

## 2018-09-21 RX ADMIN — ATORVASTATIN CALCIUM 80 MILLIGRAM(S): 80 TABLET, FILM COATED ORAL at 22:37

## 2018-09-21 RX ADMIN — Medication 325 MILLIGRAM(S): at 13:23

## 2018-09-21 RX ADMIN — POLYMYXIN B SULFATE 500 UNIT(S): 500000 INJECTION, POWDER, LYOPHILIZED, FOR SOLUTION INTRAMUSCULAR; INTRATHECAL; INTRAVENOUS; OPHTHALMIC at 05:25

## 2018-09-21 RX ADMIN — POLYMYXIN B SULFATE 500 UNIT(S): 500000 INJECTION, POWDER, LYOPHILIZED, FOR SOLUTION INTRAMUSCULAR; INTRATHECAL; INTRAVENOUS; OPHTHALMIC at 18:57

## 2018-09-21 RX ADMIN — Medication 2: at 16:59

## 2018-09-21 RX ADMIN — Medication 250 MILLIGRAM(S): at 05:25

## 2018-09-21 RX ADMIN — ENOXAPARIN SODIUM 40 MILLIGRAM(S): 100 INJECTION SUBCUTANEOUS at 13:23

## 2018-09-21 NOTE — PROGRESS NOTE ADULT - SUBJECTIVE AND OBJECTIVE BOX
[facility]  INFECTIOUS DISEASES AND INTERNAL MEDICINE at [place]  =======================================================  José Luis Walton MD  Diplomates American Board of Internal Medicine and Infectious Diseases  =======================================================    Seen with      YAMIL OLEA [id number]    Follow up: [facility] Septicemia    Patient not verbalizing  nods head no to pain  No fevers    Allergies:  No Known Allergies      Antibiotics:  cefTAZidime/avibactam IVPB 2.5 Gram(s) IV Intermittent every 8 hours  polymyxin B IVPB [id number] Unit(s) IV Intermittent every 12 hours       REVIEW OF SYSTEMS:  Unable to assess      Physical Exam:  Vital Signs Last 24 Hrs  T(C): 37.1 (21 Sep 2018 08:52), Max: 37.1 (21 Sep 2018 08:52)  T(F): 98.8 (21 Sep 2018 08:52), Max: 98.8 (21 Sep 2018 08:52)  HR: 90 (21 Sep 2018 08:52) (90 - 90)  BP: 116/66 (21 Sep 2018 08:52) (116/66 - 116/66)  RR: 18 (21 Sep 2018 08:52) (18 - 18)  SpO2: 97% (21 Sep 2018 08:52) (97% - 97%)      GEN: NAD  HEENT: normocephalic and atraumatic. EOMI. PERRL.    NECK: Supple.   LUNGS: Clear to auscultation.  HEART: Regular rate and rhythm   ABDOMEN: Soft, nontender, and nondistended.  Positive bowel sounds.    : No CVA tenderness  EXTREMITIES: Left leg in immobilizer   MSK: no joint swelling  NEUROLOGIC: Awake, not verbalizing   PSYCHIATRIC: Unable to assess   SKIN: No Rash      Labs:  09-21    137  |  99  |  19.0  ----------------------------<  162<H>  3.7   |  27.0  |  0.97    Ca    8.9      21 Sep 2018 07:00  Phos  3.3     09-21  Mg     1.8     09-21    TPro  6.5<L>  /  Alb  2.6<L>  /  TBili  0.3<L>  /  DBili  x   /  AST  22  /  ALT  19  /  AlkPhos  79  09-20               9.5    6.5   )-----------( 189      ( 21 Sep 2018 07:00 )             31.2     LIVER FUNCTIONS - ( 20 Sep 2018 07:59 )  Alb: 2.6 g/dL / Pro: 6.5 g/dL / ALK PHOS: 79 U/L / ALT: 19 U/L / AST: 22 U/L / GGT: x             RECENT CULTURES:  09-18 @ 17:26 .Blood Blood-Peripheral     No growth at 48 hours      09-17 @ 03:04 .Urine Catheterized Klepne MDRO    50,000 - 99,000 CFU/mL Klebsiella pneumoniae (Carbapenem Resistant)      09-17 @ 01:55 .Blood Blood     No growth at 48 hours      09-17 @ 01:53 .Blood Blood Klepne MDRO  Blood Culture PCR    Growth in aerobic bottle: Klebsiella pneumoniae (Carbapenem Resistant)  Aerobic Bottle: 22.04 Hours to positivity  Anaerobic Bottle: No growth to date  ***Blood Panel PCR results on this specimen are available  approximately 3 hours after the Gram stain result.***  Gram stain, PCR, and/or culture results may not always  correspond due to difference in methodologies.  ************************************************************  This PCR assay was performed using [employer].  The following targets are tested for: Enterococcus,  vancomycin resistant enterococci, Listeria monocytogenes,  coagulase negative staphylococci, S. aureus,  methicillin resistant S. aureus, Streptococcus agalactiae  (Group B), S. pneumoniae, S. pyogenes (Group A),  Acinetobacter baumannii, Enterobacter cloacae, E. coli,  Klebsiella oxytoca, K. pneumoniae, Proteus sp.,  Serratia marcescens, Haemophilus influenzae,  Neisseria meningitidis, Pseudomonas aeruginosa, Candida  albicans, C. glabrata, C krusei, C parapsilosis,  C. tropicalis and the KPC resistance gene.  "Due to technical problems, Proteus sp. will Not be reported as part of  the BCID panel until further notice"        EXAM:  CT ABDOMEN AND PELVIS                        PROCEDURE DATE:  09/20/2018    INTERPRETATION:    EXAM:    CT Abdomen and Pelvis Without Intravenous Contrast  CLINICAL HISTORY:    68 years old, male; Pain; Other: Assess for pyelo; Additional info: Uti   and bacteremia causing sepsis.  TECHNIQUE:    Axial computed tomography images of the abdomen and pelvis without   intravenous contrast.  All CT scans at this facility use at least one of these   dose optimization techniques: automated exposure control; mA and/or kV   adjustment per patient size (includes targeted exams where dose is   matched to clinical indication); or iterative reconstruction.    Coronal and sagittal reformatted images were created and reviewed.  COMPARISON:    CT ABDOMEN AND PELVIS 8/29/2018 3:45 PM  FINDINGS:    Lung bases: Trace pleural effusions.   ABDOMEN:    Liver:  Unremarkable.    Gallbladder and bile ducts:  Cholecystectomy.  No fluid in gallbladder   fossa.    No ductal dilation.    Pancreas:  Pancreas is unremarkable. No peripancreatic fluid or   inflammatory   changes.  No ductal dilation.    Spleen:  Unremarkable.  No splenomegaly.    Adrenals:  Unremarkable.  No mass.    Kidneys and ureters:  No hydronephrosis or hydroureter or evidence of   obstructive nephrolithiasis. No perinephric fluid.    Stomach and bowel:  3.3 x 2.7 cm duodenal diverticulum. No adjacent   inflammatory changes.  No areas of wall thickening in the large bowel.    No evidence of large bowel obstruction.  No pericolonic inflammatory changes   to suggest acute diverticulitis.   PELVIS:    Appendix:  Normal appendix seen.    Bladder: Mildly thickened with surrounding edema.  No stones.    Reproductive:  Unremarkable as visualized.   ABDOMEN and PELVIS:    Intraperitoneal space:  Unremarkable.  No free air.  No significant   fluid collection.    Bones/joints: Heterogeneous marrow with lytic and sclerotic foci in the   bony pelvis, similar to 8/29, clinical workup for metastasis/multiple   myeloma and follow-up MRI and/or bone scan are recommended.   Soft tissues:  Unremarkable.   Vasculature:  Unremarkable.  No abdominal aortic aneurysm.   Lymph nodes: Mildly large deep pelvic lymph nodes, nonspecific stable.  IMPRESSION:   Probable cystitis. No hydronephrosis or hydroureter or evidence of obstructive   nephrolithiasis. No perinephric fluid.  Incidental findings in the bones which follow-up is recommended as above.

## 2018-09-21 NOTE — PROGRESS NOTE ADULT - SUBJECTIVE AND OBJECTIVE BOX
CC: Altered MS/Sepsis    INTERVAL HPI/OVERNIGHT EVENTS: Patient seen and examined. 1:1 reports large BM this morning. No acute issues overnight. Cooperative with care today. Answers questions, denies chest pain, SOB, nausea, vomiting, fever, chills.    Vital Signs Last 24 Hrs  T(C): 37.1 (21 Sep 2018 08:52), Max: 37.1 (21 Sep 2018 08:52)  T(F): 98.8 (21 Sep 2018 08:52), Max: 98.8 (21 Sep 2018 08:52)  HR: 90 (21 Sep 2018 08:52) (90 - 90)  BP: 116/66 (21 Sep 2018 08:52) (116/66 - 116/66)  BP(mean): --  RR: 18 (21 Sep 2018 08:52) (18 - 18)  SpO2: 97% (21 Sep 2018 08:52) (97% - 97%)    PHYSICAL EXAM:  GENERAL: NAD, calm  HEAD:  Atraumatic, Normocephalic  NECK: Supple, No JVD, Normal thyroid  NERVOUS SYSTEM:  Alert, Motor Strength 5/5 B/L upper and lower extremities  CHEST/LUNG: Clear to auscultation bilaterally; No rales, rhonchi, wheezing, or rubs  HEART: Regular rate and rhythm; No murmurs, rubs, or gallops  ABDOMEN: Soft, Nontender, Nondistended; Bowel sounds present  EXTREMITIES:  2+ Peripheral Pulses, No clubbing, cyanosis, or edema; LLE with brace in place, +distal pulse      I&O's Detail    20 Sep 2018 07:01  -  21 Sep 2018 07:00  --------------------------------------------------------  IN:    Solution: 200 mL    Solution: 500 mL  Total IN: 700 mL    OUT:    Voided: 400 mL  Total OUT: 400 mL    Total NET: 300 mL      21 Sep 2018 07:01  -  21 Sep 2018 14:17  --------------------------------------------------------  IN:  Total IN: 0 mL    OUT:    Voided: 800 mL  Total OUT: 800 mL    Total NET: -800 mL                                    9.5    6.5   )-----------( 189      ( 21 Sep 2018 07:00 )             31.2     21 Sep 2018 07:00    137    |  99     |  19.0   ----------------------------<  162    3.7     |  27.0   |  0.97     Ca    8.9        21 Sep 2018 07:00  Phos  3.3       21 Sep 2018 07:00  Mg     1.8       21 Sep 2018 07:00    TPro  6.5    /  Alb  2.6    /  TBili  0.3    /  DBili  x      /  AST  22     /  ALT  19     /  AlkPhos  79     20 Sep 2018 07:59      CAPILLARY BLOOD GLUCOSE      POCT Blood Glucose.: 141 mg/dL (21 Sep 2018 12:15)  POCT Blood Glucose.: 150 mg/dL (21 Sep 2018 09:11)  POCT Blood Glucose.: 197 mg/dL (20 Sep 2018 21:14)  POCT Blood Glucose.: 117 mg/dL (20 Sep 2018 18:21)    LIVER FUNCTIONS - ( 20 Sep 2018 07:59 )  Alb: 2.6 g/dL / Pro: 6.5 g/dL / ALK PHOS: 79 U/L / ALT: 19 U/L / AST: 22 U/L / GGT: x             Hemoglobin A1C, Whole Blood: 6.6 % (09-18-18 @ 03:11)    MEDICATIONS  (STANDING):  aspirin enteric coated 325 milliGRAM(s) Oral daily  atorvastatin 80 milliGRAM(s) Oral at bedtime  cefTAZidime/avibactam IVPB      cefTAZidime/avibactam IVPB 2.5 Gram(s) IV Intermittent every 8 hours  dextrose 5%. 1000 milliLiter(s) (50 mL/Hr) IV Continuous <Continuous>  dextrose 50% Injectable 12.5 Gram(s) IV Push once  dextrose 50% Injectable 25 Gram(s) IV Push once  dextrose 50% Injectable 25 Gram(s) IV Push once  enoxaparin Injectable 40 milliGRAM(s) SubCutaneous daily  insulin lispro (HumaLOG) corrective regimen sliding scale   SubCutaneous Before meals and at bedtime  multiple electrolytes Injection Type 1 1000 milliLiter(s) (100 mL/Hr) IV Continuous <Continuous>  polymyxin B IVPB [id number] Unit(s) IV Intermittent every 12 hours  saccharomyces boulardii 250 milliGRAM(s) Oral two times a day    MEDICATIONS  (PRN):  dextrose 40% Gel 15 Gram(s) Oral once PRN Blood Glucose LESS THAN 70 milliGRAM(s)/deciliter  glucagon  Injectable 1 milliGRAM(s) IntraMuscular once PRN Glucose LESS THAN 70 milligrams/deciliter      RADIOLOGY & ADDITIONAL TESTS:   EXAM:  CT ABDOMEN AND PELVIS                          PROCEDURE DATE:  09/20/2018          INTERPRETATION:      EXAM:    CT Abdomen and Pelvis Without Intravenous Contrast    CLINICAL HISTORY:    68 years old, male; Pain; Other: Assess for pyelo; Additional info: Uti   and   bacteremia causing sepsis.    TECHNIQUE:    Axial computed tomography images of the abdomen and pelvis without   intravenous contrast.  All CT scans at this facility use at least one of   these   dose optimization techniques: automated exposure control; mA and/or kV   adjustment per patient size (includes targeted exams where dose is   matched to   clinical indication); or iterative reconstruction.    Coronal and sagittal reformatted images were created and reviewed.    COMPARISON:    CT ABDOMEN AND PELVIS 8/29/2018 3:45 PM    FINDINGS:    Lung bases: Trace pleural effusions.     ABDOMEN:    Liver:  Unremarkable.    Gallbladder and bile ducts:  Cholecystectomy.  No fluid in gallbladder   fossa.    No ductal dilation.    Pancreas:  Pancreas is unremarkable. No peripancreatic fluid or   inflammatory   changes.  No ductal dilation.    Spleen:  Unremarkable.  No splenomegaly.    Adrenals:  Unremarkable.  No mass.    Kidneys and ureters:  No hydronephrosis or hydroureter or evidence of   obstructive nephrolithiasis. No perinephric fluid.    Stomach and bowel:  3.3 x 2.7 cm duodenal diverticulum. No adjacent   inflammatory changes.  No areas of wall thickening in the large bowel.    No   evidence of large bowel obstruction.  No pericolonic inflammatory changes   to   suggest acute diverticulitis.     PELVIS:    Appendix:  Normal appendix seen.    Bladder: Mildly thickened with surrounding edema.  No stones.    Reproductive:  Unremarkable as visualized.     ABDOMEN and PELVIS:    Intraperitoneal space:  Unremarkable.  No free air.  No significant   fluid   collection.    Bones/joints: Heterogeneous marrow with lytic and sclerotic foci in the   bony pelvis, similar to 8/29, clinical workup for metastasis/multiple   myeloma and follow-up MRI and/or bone scan are recommended.   Soft tissues:  Unremarkable.   Vasculature:  Unremarkable.  No abdominal aortic aneurysm.   Lymph nodes: Mildly large deep pelvic lymph nodes, nonspecific stable.    IMPRESSION:     Probable cystitis. No hydronephrosis or hydroureter or evidence of   obstructive   nephrolithiasis. No perinephric fluid.    Incidental findings in the bones which follow-up is recommended as above.                 ABEBA MAJOR M.D., ATTENDING RADIOLOGIST  This document has been electronically signed. Sep 21 2018 10:50AM

## 2018-09-22 LAB
-  CEFTAZIDIME/AVIBACTAM: SIGNIFICANT CHANGE UP
ANION GAP SERPL CALC-SCNC: 11 MMOL/L — SIGNIFICANT CHANGE UP (ref 5–17)
BUN SERPL-MCNC: 16 MG/DL — SIGNIFICANT CHANGE UP (ref 8–20)
CALCIUM SERPL-MCNC: 8.9 MG/DL — SIGNIFICANT CHANGE UP (ref 8.6–10.2)
CHLORIDE SERPL-SCNC: 100 MMOL/L — SIGNIFICANT CHANGE UP (ref 98–107)
CO2 SERPL-SCNC: 29 MMOL/L — SIGNIFICANT CHANGE UP (ref 22–29)
CREAT SERPL-MCNC: 1.08 MG/DL — SIGNIFICANT CHANGE UP (ref 0.5–1.3)
CULTURE RESULTS: SIGNIFICANT CHANGE UP
CULTURE RESULTS: SIGNIFICANT CHANGE UP
GLUCOSE BLDC GLUCOMTR-MCNC: 115 MG/DL — HIGH (ref 70–99)
GLUCOSE BLDC GLUCOMTR-MCNC: 116 MG/DL — HIGH (ref 70–99)
GLUCOSE BLDC GLUCOMTR-MCNC: 228 MG/DL — HIGH (ref 70–99)
GLUCOSE SERPL-MCNC: 172 MG/DL — HIGH (ref 70–115)
HCT VFR BLD CALC: 29.8 % — LOW (ref 42–52)
HGB BLD-MCNC: 9 G/DL — LOW (ref 14–18)
MAGNESIUM SERPL-MCNC: 1.6 MG/DL — SIGNIFICANT CHANGE UP (ref 1.6–2.6)
MCHC RBC-ENTMCNC: 27.5 PG — SIGNIFICANT CHANGE UP (ref 27–31)
MCHC RBC-ENTMCNC: 30.2 G/DL — LOW (ref 32–36)
MCV RBC AUTO: 91.1 FL — SIGNIFICANT CHANGE UP (ref 80–94)
ORGANISM # SPEC MICROSCOPIC CNT: SIGNIFICANT CHANGE UP
PHOSPHATE SERPL-MCNC: 2.9 MG/DL — SIGNIFICANT CHANGE UP (ref 2.4–4.7)
PLATELET # BLD AUTO: 231 K/UL — SIGNIFICANT CHANGE UP (ref 150–400)
POTASSIUM SERPL-MCNC: 3.8 MMOL/L — SIGNIFICANT CHANGE UP (ref 3.5–5.3)
POTASSIUM SERPL-SCNC: 3.8 MMOL/L — SIGNIFICANT CHANGE UP (ref 3.5–5.3)
RBC # BLD: 3.27 M/UL — LOW (ref 4.6–6.2)
RBC # FLD: 17.1 % — HIGH (ref 11–15.6)
SODIUM SERPL-SCNC: 140 MMOL/L — SIGNIFICANT CHANGE UP (ref 135–145)
SPECIMEN SOURCE: SIGNIFICANT CHANGE UP
WBC # BLD: 7 K/UL — SIGNIFICANT CHANGE UP (ref 4.8–10.8)
WBC # FLD AUTO: 7 K/UL — SIGNIFICANT CHANGE UP (ref 4.8–10.8)

## 2018-09-22 PROCEDURE — 99233 SBSQ HOSP IP/OBS HIGH 50: CPT

## 2018-09-22 PROCEDURE — 99232 SBSQ HOSP IP/OBS MODERATE 35: CPT

## 2018-09-22 RX ORDER — POTASSIUM CHLORIDE 20 MEQ
20 PACKET (EA) ORAL ONCE
Qty: 0 | Refills: 0 | Status: COMPLETED | OUTPATIENT
Start: 2018-09-22 | End: 2018-09-22

## 2018-09-22 RX ORDER — ACETAMINOPHEN 500 MG
650 TABLET ORAL ONCE
Qty: 0 | Refills: 0 | Status: COMPLETED | OUTPATIENT
Start: 2018-09-22 | End: 2018-09-22

## 2018-09-22 RX ORDER — MAGNESIUM SULFATE 500 MG/ML
2 VIAL (ML) INJECTION
Qty: 0 | Refills: 0 | Status: DISCONTINUED | OUTPATIENT
Start: 2018-09-22 | End: 2018-09-22

## 2018-09-22 RX ORDER — MAGNESIUM SULFATE 500 MG/ML
2 VIAL (ML) INJECTION
Qty: 0 | Refills: 0 | Status: COMPLETED | OUTPATIENT
Start: 2018-09-22 | End: 2018-09-22

## 2018-09-22 RX ADMIN — POLYMYXIN B SULFATE 500 UNIT(S): 500000 INJECTION, POWDER, LYOPHILIZED, FOR SOLUTION INTRAMUSCULAR; INTRATHECAL; INTRAVENOUS; OPHTHALMIC at 20:08

## 2018-09-22 RX ADMIN — Medication 325 MILLIGRAM(S): at 12:11

## 2018-09-22 RX ADMIN — Medication 20 MILLIEQUIVALENT(S): at 12:11

## 2018-09-22 RX ADMIN — Medication 50 GRAM(S): at 18:48

## 2018-09-22 RX ADMIN — Medication 4: at 21:21

## 2018-09-22 RX ADMIN — ENOXAPARIN SODIUM 40 MILLIGRAM(S): 100 INJECTION SUBCUTANEOUS at 12:11

## 2018-09-22 RX ADMIN — Medication 650 MILLIGRAM(S): at 01:22

## 2018-09-22 RX ADMIN — Medication 50 GRAM(S): at 12:11

## 2018-09-22 RX ADMIN — POLYMYXIN B SULFATE 500 UNIT(S): 500000 INJECTION, POWDER, LYOPHILIZED, FOR SOLUTION INTRAMUSCULAR; INTRATHECAL; INTRAVENOUS; OPHTHALMIC at 05:24

## 2018-09-22 RX ADMIN — ATORVASTATIN CALCIUM 80 MILLIGRAM(S): 80 TABLET, FILM COATED ORAL at 21:22

## 2018-09-22 RX ADMIN — Medication 250 MILLIGRAM(S): at 18:48

## 2018-09-22 RX ADMIN — Medication 250 MILLIGRAM(S): at 05:21

## 2018-09-22 NOTE — PROGRESS NOTE ADULT - SUBJECTIVE AND OBJECTIVE BOX
is a 69 y/o M with h/o MR, HTN, DM Type 2, dyslipidemia, recent Left tibial plateau fracture. He was admitted with AMS and Sepsis with shock present at admission requiring pressors and admitted to MICU. His BOUCHRA is secondary to ATN present at admission. He was treated with broad spectrum antibiotics for sepsis, secondary to UTI.  He was found to have UTI and Bacteremia with carbapenem resistant klebsiella.    He's on ceftazidime, doing fine, but we're waiting for sensitivities. He will need PICC line and his family wants him home on IV antibiotics during discharge. I appreciate ID consult and recs.    Summary:   REVIEW OF SYSTEMS    General:     Skin/Breast:  	  Ophthalmologic:  	  ENMT:	    Respiratory and Thorax:  	  Cardiovascular:	    Gastrointestinal:	    Genitourinary:	    Musculoskeletal:	 +left  foot pain    Neurological:	    Psychiatric:	    Hematology/Lymphatics:	    Endocrine:	    Allergic/Immunologic:	  Vital Signs Last 24 Hrs  T(C): 37.2 (22 Sep 2018 07:45), Max: 37.7 (22 Sep 2018 00:02)  T(F): 98.9 (22 Sep 2018 07:45), Max: 99.9 (22 Sep 2018 00:02)  HR: 74 (22 Sep 2018 07:45) (74 - 86)  BP: 111/64 (22 Sep 2018 07:45) (95/49 - 125/63)  BP(mean): --  RR: 18 (22 Sep 2018 07:45) (18 - 20)  SpO2: 96% (22 Sep 2018 07:45) (95% - 97%)  PHYSICAL EXAM:  GEN:  HEENT:   CVS:  PULM:  ABD:  EXTREM:  NEURO:  PSYCH  SKIN:                          9.0    7.0   )-----------( 231      ( 22 Sep 2018 07:40 )             29.8     09-22    140  |  100  |  16.0  ----------------------------<  172<H>  3.8   |  29.0  |  1.08    Ca    8.9      22 Sep 2018 07:40  Phos  2.9     09-22  Mg     1.6     09-22    Radiology:     MEDICATIONS  (STANDING):  aspirin enteric coated 325 milliGRAM(s) Oral daily  atorvastatin 80 milliGRAM(s) Oral at bedtime  cefTAZidime/avibactam IVPB      cefTAZidime/avibactam IVPB 2.5 Gram(s) IV Intermittent every 8 hours  dextrose 5%. 1000 milliLiter(s) (50 mL/Hr) IV Continuous <Continuous>  dextrose 50% Injectable 12.5 Gram(s) IV Push once  dextrose 50% Injectable 25 Gram(s) IV Push once  dextrose 50% Injectable 25 Gram(s) IV Push once  enoxaparin Injectable 40 milliGRAM(s) SubCutaneous daily  insulin lispro (HumaLOG) corrective regimen sliding scale   SubCutaneous Before meals and at bedtime  magnesium sulfate  IVPB 2 Gram(s) IV Intermittent every 2 hours  polymyxin B IVPB [id number] Unit(s) IV Intermittent every 12 hours  saccharomyces boulardii 250 milliGRAM(s) Oral two times a day    MEDICATIONS  (PRN):  dextrose 40% Gel 15 Gram(s) Oral once PRN Blood Glucose LESS THAN 70 milliGRAM(s)/deciliter  glucagon  Injectable 1 milliGRAM(s) IntraMuscular once PRN Glucose LESS THAN 70 milligrams/deciliter  is a 67 y/o M with h/o MR, HTN, DM Type 2, dyslipidemia, recent Left tibial plateau fracture. He was admitted with AMS and Sepsis with shock present at admission requiring pressors and admitted to MICU. His BOUCHRA is secondary to ATN present at admission. He was treated with broad spectrum antibiotics for sepsis, secondary to UTI.  He was found to have UTI and Bacteremia with carbapenem resistant klebsiella.    He's on ceftazidime, doing fine, but we're waiting for sensitivities. He will need PICC line and his family wants him home on IV antibiotics during discharge. I appreciate ID consult and recs.    Summary:   REVIEW OF SYSTEMS    General:     Skin/Breast:  	  Ophthalmologic:  	  ENMT:	    Respiratory and Thorax:  	  Cardiovascular:	    Gastrointestinal:	    Genitourinary:	    Musculoskeletal:	 +left  foot pain    Neurological:	    Psychiatric:	    Hematology/Lymphatics:	    Endocrine:	    Allergic/Immunologic:	  Vital Signs Last 24 Hrs  T(C): 37.2 (22 Sep 2018 07:45), Max: 37.7 (22 Sep 2018 00:02)  T(F): 98.9 (22 Sep 2018 07:45), Max: 99.9 (22 Sep 2018 00:02)  HR: 74 (22 Sep 2018 07:45) (74 - 86)  BP: 111/64 (22 Sep 2018 07:45) (95/49 - 125/63)  BP(mean): --  RR: 18 (22 Sep 2018 07:45) (18 - 20)  SpO2: 96% (22 Sep 2018 07:45) (95% - 97%)  GENERAL: NAD, calm  HEAD:  Atraumatic, Normocephalic  NECK: Supple, No JVD, Normal thyroid  NERVOUS SYSTEM:  Alert, Motor Strength 5/5 B/L upper and lower extremities  CHEST/LUNG: Clear to auscultation bilaterally; No rales, rhonchi, wheezing, or rubs  HEART: Regular rate and rhythm; No murmurs, rubs, or gallops  ABDOMEN: Soft, Nontender, Nondistended; Bowel sounds present  EXTREMITIES:  2+ Peripheral Pulses, No clubbing, cyanosis, or edema; LLE with brace in place, +distal pulse                      9.0    7.0   )-----------( 231      ( 22 Sep 2018 07:40 )             29.8     09-22    140  |  100  |  16.0  ----------------------------<  172<H>  3.8   |  29.0  |  1.08    Ca    8.9      22 Sep 2018 07:40  Phos  2.9     09-22  Mg     1.6     09-22    Radiology:     MEDICATIONS  (STANDING):  aspirin enteric coated 325 milliGRAM(s) Oral daily  atorvastatin 80 milliGRAM(s) Oral at bedtime  cefTAZidime/avibactam IVPB      cefTAZidime/avibactam IVPB 2.5 Gram(s) IV Intermittent every 8 hours  dextrose 5%. 1000 milliLiter(s) (50 mL/Hr) IV Continuous <Continuous>  dextrose 50% Injectable 12.5 Gram(s) IV Push once  dextrose 50% Injectable 25 Gram(s) IV Push once  dextrose 50% Injectable 25 Gram(s) IV Push once  enoxaparin Injectable 40 milliGRAM(s) SubCutaneous daily  insulin lispro (HumaLOG) corrective regimen sliding scale   SubCutaneous Before meals and at bedtime  magnesium sulfate  IVPB 2 Gram(s) IV Intermittent every 2 hours  polymyxin B IVPB [id number] Unit(s) IV Intermittent every 12 hours  saccharomyces boulardii 250 milliGRAM(s) Oral two times a day    MEDICATIONS  (PRN):  dextrose 40% Gel 15 Gram(s) Oral once PRN Blood Glucose LESS THAN 70 milliGRAM(s)/deciliter  glucagon  Injectable 1 milliGRAM(s) IntraMuscular once PRN Glucose LESS THAN 70 milligrams/deciliter

## 2018-09-22 NOTE — PROGRESS NOTE ADULT - SUBJECTIVE AND OBJECTIVE BOX
[facility]  INFECTIOUS DISEASES AND INTERNAL MEDICINE at [place]  =======================================================  José Luis Walton MD  Diplomates American Board of Internal Medicine and Infectious Diseases  =======================================================    Bedside aid translating in Samoan    YAMIL OLEA [id number]    Follow up: [facility] Septicemia    Patient more verbal today, answeing minimal question, reports feels better and no pain  No fevers    Allergies:  No Known Allergies      Antibiotics:  cefTAZidime/avibactam IVPB 2.5 Gram(s) IV Intermittent every 8 hours  polymyxin B IVPB [id number] Unit(s) IV Intermittent every 12 hours       REVIEW OF SYSTEMS:  Unable to assess, not answering all question      Physical Exam:  Vital Signs Last 24 Hrs  T(C): 37.2 (22 Sep 2018 07:45), Max: 37.7 (22 Sep 2018 00:02)  T(F): 98.9 (22 Sep 2018 07:45), Max: 99.9 (22 Sep 2018 00:02)  HR: 74 (22 Sep 2018 07:45) (74 - 86)  BP: 111/64 (22 Sep 2018 07:45) (95/49 - 125/63)  RR: 18 (22 Sep 2018 07:45) (18 - 20)  SpO2: 96% (22 Sep 2018 07:45) (95% - 97%)      GEN: NAD  HEENT: normocephalic and atraumatic. EOMI. PERRL.    NECK: Supple.   LUNGS: Clear to auscultation.  HEART: Regular rate and rhythm   ABDOMEN: Soft, nontender, and nondistended.  Positive bowel sounds.    : No CVA tenderness  EXTREMITIES: Left leg in immobilizer   MSK: no joint swelling  NEUROLOGIC: Awake, minimal verbalization    PSYCHIATRIC: Unable to assess   SKIN: No Rash      Labs:  09-22    140  |  100  |  16.0  ----------------------------<  172<H>  3.8   |  29.0  |  1.08    Ca    8.9      22 Sep 2018 07:40  Phos  2.9     09-22  Mg     1.6     09-22               9.0    7.0   )-----------( 231      ( 22 Sep 2018 07:40 )             29.8         RECENT CULTURES:  09-20 @ 08:00 .Blood Blood     No growth at 48 hours      09-20 @ 07:59 .Blood Blood     No growth at 48 hours      09-18 @ 17:26 .Blood Blood-Peripheral     No growth at 48 hours      09-17 @ 03:04 .Urine Catheterized Klepne MDRO    50,000 - 99,000 CFU/mL Klebsiella pneumoniae (Carbapenem Resistant)      09-17 @ 01:55 .Blood Blood     No growth at 5 days.      09-17 @ 01:53 .Blood Blood Klepne MDRO  Blood Culture PCR    Growth in aerobic bottle: Klebsiella pneumoniae (Carbapenem Resistant)  Aerobic Bottle: 22.04 Hours to positivity  Anaerobic Bottle: No growth to date  ***Blood Panel PCR results on this specimen are available  approximately 3 hours after the Gram stain result.***  Gram stain, PCR, and/or culture results may not always  correspond due to difference in methodologies.  ************************************************************  This PCR assay was performed using [employer].  The following targets are tested for: Enterococcus,  vancomycin resistant enterococci, Listeria monocytogenes,  coagulase negative staphylococci, S. aureus,  methicillin resistant S. aureus, Streptococcus agalactiae  (Group B), S. pneumoniae, S. pyogenes (Group A),  Acinetobacter baumannii, Enterobacter cloacae, E. coli,  Klebsiella oxytoca, K. pneumoniae, Proteus sp.,  Serratia marcescens, Haemophilus influenzae,  Neisseria meningitidis, Pseudomonas aeruginosa, Candida  albicans, C. glabrata, C krusei, C parapsilosis,  C. tropicalis and the KPC resistance gene.  "Due to technical problems, Proteus sp. will Not be reported as part of  the BCID panel until further notice"        EXAM:  CT ABDOMEN AND PELVIS                        PROCEDURE DATE:  09/20/2018    INTERPRETATION:    EXAM:    CT Abdomen and Pelvis Without Intravenous Contrast  CLINICAL HISTORY:    68 years old, male; Pain; Other: Assess for pyelo; Additional info: Uti   and bacteremia causing sepsis.  TECHNIQUE:    Axial computed tomography images of the abdomen and pelvis without   intravenous contrast.  All CT scans at this facility use at least one of these   dose optimization techniques: automated exposure control; mA and/or kV   adjustment per patient size (includes targeted exams where dose is   matched to clinical indication); or iterative reconstruction.    Coronal and sagittal reformatted images were created and reviewed.  COMPARISON:    CT ABDOMEN AND PELVIS 8/29/2018 3:45 PM  FINDINGS:    Lung bases: Trace pleural effusions.   ABDOMEN:    Liver:  Unremarkable.    Gallbladder and bile ducts:  Cholecystectomy.  No fluid in gallbladder   fossa.    No ductal dilation.    Pancreas:  Pancreas is unremarkable. No peripancreatic fluid or   inflammatory   changes.  No ductal dilation.    Spleen:  Unremarkable.  No splenomegaly.    Adrenals:  Unremarkable.  No mass.    Kidneys and ureters:  No hydronephrosis or hydroureter or evidence of   obstructive nephrolithiasis. No perinephric fluid.    Stomach and bowel:  3.3 x 2.7 cm duodenal diverticulum. No adjacent   inflammatory changes.  No areas of wall thickening in the large bowel.    No evidence of large bowel obstruction.  No pericolonic inflammatory changes   to suggest acute diverticulitis.   PELVIS:    Appendix:  Normal appendix seen.    Bladder: Mildly thickened with surrounding edema.  No stones.    Reproductive:  Unremarkable as visualized.   ABDOMEN and PELVIS:    Intraperitoneal space:  Unremarkable.  No free air.  No significant   fluid collection.    Bones/joints: Heterogeneous marrow with lytic and sclerotic foci in the   bony pelvis, similar to 8/29, clinical workup for metastasis/multiple   myeloma and follow-up MRI and/or bone scan are recommended.   Soft tissues:  Unremarkable.   Vasculature:  Unremarkable.  No abdominal aortic aneurysm.   Lymph nodes: Mildly large deep pelvic lymph nodes, nonspecific stable.  IMPRESSION:   Probable cystitis. No hydronephrosis or hydroureter or evidence of obstructive   nephrolithiasis. No perinephric fluid.  Incidental findings in the bones which follow-up is recommended as above.

## 2018-09-23 DIAGNOSIS — K29.60 OTHER GASTRITIS WITHOUT BLEEDING: ICD-10-CM

## 2018-09-23 LAB
ANION GAP SERPL CALC-SCNC: 11 MMOL/L — SIGNIFICANT CHANGE UP (ref 5–17)
BUN SERPL-MCNC: 13 MG/DL — SIGNIFICANT CHANGE UP (ref 8–20)
CALCIUM SERPL-MCNC: 9.1 MG/DL — SIGNIFICANT CHANGE UP (ref 8.6–10.2)
CHLORIDE SERPL-SCNC: 96 MMOL/L — LOW (ref 98–107)
CO2 SERPL-SCNC: 29 MMOL/L — SIGNIFICANT CHANGE UP (ref 22–29)
CREAT SERPL-MCNC: 0.96 MG/DL — SIGNIFICANT CHANGE UP (ref 0.5–1.3)
CULTURE RESULTS: SIGNIFICANT CHANGE UP
CULTURE RESULTS: SIGNIFICANT CHANGE UP
GLUCOSE BLDC GLUCOMTR-MCNC: 106 MG/DL — HIGH (ref 70–99)
GLUCOSE BLDC GLUCOMTR-MCNC: 110 MG/DL — HIGH (ref 70–99)
GLUCOSE BLDC GLUCOMTR-MCNC: 118 MG/DL — HIGH (ref 70–99)
GLUCOSE BLDC GLUCOMTR-MCNC: 123 MG/DL — HIGH (ref 70–99)
GLUCOSE SERPL-MCNC: 143 MG/DL — HIGH (ref 70–115)
HCT VFR BLD CALC: 29.6 % — LOW (ref 42–52)
HGB BLD-MCNC: 9.2 G/DL — LOW (ref 14–18)
MAGNESIUM SERPL-MCNC: 1.8 MG/DL — SIGNIFICANT CHANGE UP (ref 1.6–2.6)
MCHC RBC-ENTMCNC: 28.2 PG — SIGNIFICANT CHANGE UP (ref 27–31)
MCHC RBC-ENTMCNC: 31.1 G/DL — LOW (ref 32–36)
MCV RBC AUTO: 90.8 FL — SIGNIFICANT CHANGE UP (ref 80–94)
PHOSPHATE SERPL-MCNC: 2.8 MG/DL — SIGNIFICANT CHANGE UP (ref 2.4–4.7)
PLATELET # BLD AUTO: 261 K/UL — SIGNIFICANT CHANGE UP (ref 150–400)
POTASSIUM SERPL-MCNC: 4 MMOL/L — SIGNIFICANT CHANGE UP (ref 3.5–5.3)
POTASSIUM SERPL-SCNC: 4 MMOL/L — SIGNIFICANT CHANGE UP (ref 3.5–5.3)
RBC # BLD: 3.26 M/UL — LOW (ref 4.6–6.2)
RBC # FLD: 17 % — HIGH (ref 11–15.6)
SODIUM SERPL-SCNC: 136 MMOL/L — SIGNIFICANT CHANGE UP (ref 135–145)
SPECIMEN SOURCE: SIGNIFICANT CHANGE UP
SPECIMEN SOURCE: SIGNIFICANT CHANGE UP
WBC # BLD: 7.7 K/UL — SIGNIFICANT CHANGE UP (ref 4.8–10.8)
WBC # FLD AUTO: 7.7 K/UL — SIGNIFICANT CHANGE UP (ref 4.8–10.8)

## 2018-09-23 PROCEDURE — 99232 SBSQ HOSP IP/OBS MODERATE 35: CPT

## 2018-09-23 PROCEDURE — 99233 SBSQ HOSP IP/OBS HIGH 50: CPT

## 2018-09-23 RX ORDER — MAGNESIUM SULFATE 500 MG/ML
2 VIAL (ML) INJECTION ONCE
Qty: 0 | Refills: 0 | Status: COMPLETED | OUTPATIENT
Start: 2018-09-23 | End: 2018-09-23

## 2018-09-23 RX ORDER — ACETAMINOPHEN 500 MG
650 TABLET ORAL EVERY 6 HOURS
Qty: 0 | Refills: 0 | Status: DISCONTINUED | OUTPATIENT
Start: 2018-09-23 | End: 2018-09-27

## 2018-09-23 RX ORDER — PANTOPRAZOLE SODIUM 20 MG/1
40 TABLET, DELAYED RELEASE ORAL
Qty: 0 | Refills: 0 | Status: DISCONTINUED | OUTPATIENT
Start: 2018-09-23 | End: 2018-09-27

## 2018-09-23 RX ADMIN — Medication 650 MILLIGRAM(S): at 12:21

## 2018-09-23 RX ADMIN — PANTOPRAZOLE SODIUM 40 MILLIGRAM(S): 20 TABLET, DELAYED RELEASE ORAL at 14:35

## 2018-09-23 RX ADMIN — ATORVASTATIN CALCIUM 80 MILLIGRAM(S): 80 TABLET, FILM COATED ORAL at 21:49

## 2018-09-23 RX ADMIN — Medication 30 MILLILITER(S): at 14:35

## 2018-09-23 RX ADMIN — Medication 325 MILLIGRAM(S): at 12:22

## 2018-09-23 RX ADMIN — Medication 50 GRAM(S): at 10:01

## 2018-09-23 RX ADMIN — Medication 650 MILLIGRAM(S): at 13:20

## 2018-09-23 RX ADMIN — Medication 250 MILLIGRAM(S): at 05:38

## 2018-09-23 RX ADMIN — ENOXAPARIN SODIUM 40 MILLIGRAM(S): 100 INJECTION SUBCUTANEOUS at 12:22

## 2018-09-23 RX ADMIN — Medication 250 MILLIGRAM(S): at 17:18

## 2018-09-23 RX ADMIN — POLYMYXIN B SULFATE 500 UNIT(S): 500000 INJECTION, POWDER, LYOPHILIZED, FOR SOLUTION INTRAMUSCULAR; INTRATHECAL; INTRAVENOUS; OPHTHALMIC at 05:37

## 2018-09-23 NOTE — DIETITIAN INITIAL EVALUATION ADULT. - OTHER INFO
Family reports pt did not like the food at previous facility and lost a few lbs also pt had a lot of edema in legs which has since gone down.

## 2018-09-23 NOTE — PROGRESS NOTE ADULT - SUBJECTIVE AND OBJECTIVE BOX
[facility]  INFECTIOUS DISEASES AND INTERNAL MEDICINE at [place]  =======================================================  José Luis Walton MD  Diplomates American Board of Internal Medicine and Infectious Diseases  =======================================================    Bedside aid translating in Salvadorean    YAMIL OLEA [id number]    Follow up: [facility] Septicemia    Patient more verbal today, answeing minimal question, reports feels better and no pain  No fevers    Allergies:  No Known Allergies      Antibiotics:  cefTAZidime/avibactam IVPB 2.5 Gram(s) IV Intermittent every 8 hours  polymyxin B IVPB [id number] Unit(s) IV Intermittent every 12 hours       REVIEW OF SYSTEMS:  Unable to assess, not answering all question      Physical Exam:  Vital Signs Last 24 Hrs  T(C): 37.1 (23 Sep 2018 06:00), Max: 37.1 (22 Sep 2018 16:28)  T(F): 98.8 (23 Sep 2018 06:00), Max: 98.8 (23 Sep 2018 06:00)  HR: 82 (23 Sep 2018 06:00) (78 - 82)  BP: 120/59 (23 Sep 2018 06:00) (118/61 - 120/59)  RR: 18 (23 Sep 2018 06:00) (18 - 18)  SpO2: 95% (23 Sep 2018 06:00) (94% - 95%)      GEN: NAD  HEENT: normocephalic and atraumatic. EOMI. PERRL.    NECK: Supple.   LUNGS: Clear to auscultation.  HEART: Regular rate and rhythm   ABDOMEN: Soft, nontender, and nondistended.  Positive bowel sounds.    : No CVA tenderness  EXTREMITIES: Left leg in immobilizer   MSK: no joint swelling  NEUROLOGIC: Awake, minimal verbalization    PSYCHIATRIC: Unable to assess   SKIN: No Rash      Labs:  09-23    136  |  96<L>  |  13.0  ----------------------------<  143<H>  4.0   |  29.0  |  0.96    Ca    9.1      23 Sep 2018 07:46  Phos  2.8     09-23  Mg     1.8     09-23               9.2    7.7   )-----------( 261      ( 23 Sep 2018 07:46 )             29.6     RECENT CULTURES:  09-20 @ 18:15 .Other         09-20 @ 08:00 .Blood Blood     No growth at 48 hours      09-20 @ 07:59 .Blood Blood     No growth at 48 hours      09-18 @ 17:26 .Blood Blood-Peripheral     No growth at 48 hours      09-17 @ 03:04 .Urine Catheterized Klepne MDRO    50,000 - 99,000 CFU/mL Klebsiella pneumoniae (Carbapenem Resistant)      09-17 @ 01:55 .Blood Blood     No growth at 5 days.      09-17 @ 01:53 .Blood Blood Klepne MDRO  Blood Culture PCR    Growth in aerobic bottle: Klebsiella pneumoniae (Carbapenem Resistant)  Aerobic Bottle: 22.04 Hours to positivity  Anaerobic Bottle: No growth at 5 days.  ***Blood Panel PCR results on this specimen are available  approximately 3 hours after theGram stain result.***  Gram stain, PCR, and/or culture results may not always  correspond due to difference in methodologies.  ************************************************************  This PCR assay was performed using [employer].  The following targets are tested for: Enterococcus,  vancomycin resistant enterococci, Listeria monocytogenes,  coagulase negative staphylococci, S. aureus,  methicillin resistant S. aureus, Streptococcus agalactiae  (Group B), S. pneumoniae, S. pyogenes (Group A),  Acinetobacter baumannii, Enterobacter cloacae, E. coli,  Klebsiella oxytoca, K. pneumoniae, Proteus sp.,  Serratia marcescens, Haemophilus influenzae,  Neisseria meningitidis, Pseudomonas aeruginosa, Candida  albicans, C. glabrata, C krusei,C parapsilosis,  C. tropicalis and the KPC resistance gene.  "Due to technical problems, Proteus sp. will Not be reported as part of  the BCID panel until further notice"      EXAM:  CT ABDOMEN AND PELVIS                        PROCEDURE DATE:  09/20/2018    INTERPRETATION:    EXAM:    CT Abdomen and Pelvis Without Intravenous Contrast  CLINICAL HISTORY:    68 years old, male; Pain; Other: Assess for pyelo; Additional info: Uti   and bacteremia causing sepsis.  TECHNIQUE:    Axial computed tomography images of the abdomen and pelvis without   intravenous contrast.  All CT scans at this facility use at least one of these   dose optimization techniques: automated exposure control; mA and/or kV   adjustment per patient size (includes targeted exams where dose is   matched to clinical indication); or iterative reconstruction.    Coronal and sagittal reformatted images were created and reviewed.  COMPARISON:    CT ABDOMEN AND PELVIS 8/29/2018 3:45 PM  FINDINGS:    Lung bases: Trace pleural effusions.   ABDOMEN:    Liver:  Unremarkable.    Gallbladder and bile ducts:  Cholecystectomy.  No fluid in gallbladder   fossa.    No ductal dilation.    Pancreas:  Pancreas is unremarkable. No peripancreatic fluid or   inflammatory   changes.  No ductal dilation.    Spleen:  Unremarkable.  No splenomegaly.    Adrenals:  Unremarkable.  No mass.    Kidneys and ureters:  No hydronephrosis or hydroureter or evidence of   obstructive nephrolithiasis. No perinephric fluid.    Stomach and bowel:  3.3 x 2.7 cm duodenal diverticulum. No adjacent   inflammatory changes.  No areas of wall thickening in the large bowel.    No evidence of large bowel obstruction.  No pericolonic inflammatory changes   to suggest acute diverticulitis.   PELVIS:    Appendix:  Normal appendix seen.    Bladder: Mildly thickened with surrounding edema.  No stones.    Reproductive:  Unremarkable as visualized.   ABDOMEN and PELVIS:    Intraperitoneal space:  Unremarkable.  No free air.  No significant   fluid collection.    Bones/joints: Heterogeneous marrow with lytic and sclerotic foci in the   bony pelvis, similar to 8/29, clinical workup for metastasis/multiple   myeloma and follow-up MRI and/or bone scan are recommended.   Soft tissues:  Unremarkable.   Vasculature:  Unremarkable.  No abdominal aortic aneurysm.   Lymph nodes: Mildly large deep pelvic lymph nodes, nonspecific stable.  IMPRESSION:   Probable cystitis. No hydronephrosis or hydroureter or evidence of obstructive   nephrolithiasis. No perinephric fluid.  Incidental findings in the bones which follow-up is recommended as above.

## 2018-09-23 NOTE — PROGRESS NOTE ADULT - SUBJECTIVE AND OBJECTIVE BOX
is a 69 y/o M with h/o MR, HTN, DM Type 2, dyslipidemia, recent Left tibial plateau fracture. He was admitted with AMS and Sepsis with shock present at admission requiring pressors and admitted to MICU. His BOUCHRA is secondary to ATN present at admission. He was treated with broad spectrum antibiotics for sepsis, secondary to UTI.  He was found to have UTI and Bacteremia with carbapenem resistant klebsiella.    He's on ceftazidime, doing fine. He will need PICC line and his family wants him home on IV antibiotics during discharge. I appreciate ID consult and recs.    He is c/o of abdominal pain today, I suspect reflux given his pannus and his abdomen pushing up on his chest due to chronic bed positioning. I've ordered protonix and maalox for reflux treatment.    Summary:   REVIEW OF SYSTEMS    General:     Skin/Breast:  	  Ophthalmologic:  	  ENMT:	    Respiratory and Thorax:  	  Cardiovascular:	    Gastrointestinal:	+belly pain    Genitourinary:	    Musculoskeletal:	 +left  foot pain    Neurological:	    Psychiatric:	    Hematology/Lymphatics:	    Endocrine:	    Allergic/Immunologic:	    Vital Signs Last 24 Hrs  T(C): 37.1 (23 Sep 2018 06:00), Max: 37.1 (22 Sep 2018 16:28)  T(F): 98.8 (23 Sep 2018 06:00), Max: 98.8 (23 Sep 2018 06:00)  HR: 82 (23 Sep 2018 06:00) (78 - 82)  BP: 120/59 (23 Sep 2018 06:00) (118/61 - 120/59)  BP(mean): --  RR: 18 (23 Sep 2018 06:00) (18 - 18)  SpO2: 95% (23 Sep 2018 06:00) (94% - 95%)  GENERAL: NAD, calm  HEAD:  Atraumatic, Normocephalic  NECK: Supple, No JVD, Normal thyroid  NERVOUS SYSTEM:  Alert, Motor Strength 5/5 B/L upper and lower extremities  CHEST/LUNG: Clear to auscultation bilaterally; No rales, rhonchi, wheezing, or rubs  HEART: Regular rate and rhythm; No murmurs, rubs, or gallops  ABDOMEN: Soft, Nontender, Nondistended; Bowel sounds present  EXTREMITIES:  2+ Peripheral Pulses, No clubbing, cyanosis, or edema; LLE with brace in place, +distal pulse                                 9.2    7.7   )-----------( 261      ( 23 Sep 2018 07:46 )             29.6     09-23    136  |  96<L>  |  13.0  ----------------------------<  143<H>  4.0   |  29.0  |  0.96    Ca    9.1      23 Sep 2018 07:46  Phos  2.8     09-23  Mg     1.8     09-23    Radiology:     MEDICATIONS  (STANDING):  aspirin enteric coated 325 milliGRAM(s) Oral daily  atorvastatin 80 milliGRAM(s) Oral at bedtime  cefTAZidime/avibactam IVPB      cefTAZidime/avibactam IVPB 2.5 Gram(s) IV Intermittent every 8 hours  dextrose 5%. 1000 milliLiter(s) (50 mL/Hr) IV Continuous <Continuous>  dextrose 50% Injectable 12.5 Gram(s) IV Push once  dextrose 50% Injectable 25 Gram(s) IV Push once  dextrose 50% Injectable 25 Gram(s) IV Push once  enoxaparin Injectable 40 milliGRAM(s) SubCutaneous daily  insulin lispro (HumaLOG) corrective regimen sliding scale   SubCutaneous Before meals and at bedtime  pantoprazole    Tablet 40 milliGRAM(s) Oral before breakfast  saccharomyces boulardii 250 milliGRAM(s) Oral two times a day    MEDICATIONS  (PRN):  acetaminophen   Tablet .. 650 milliGRAM(s) Oral every 6 hours PRN Moderate Pain (4 - 6)  aluminum hydroxide/magnesium hydroxide/simethicone Suspension 30 milliLiter(s) Oral every 6 hours PRN Dyspepsia  dextrose 40% Gel 15 Gram(s) Oral once PRN Blood Glucose LESS THAN 70 milliGRAM(s)/deciliter  glucagon  Injectable 1 milliGRAM(s) IntraMuscular once PRN Glucose LESS THAN 70 milligrams/deciliter

## 2018-09-24 LAB
ANION GAP SERPL CALC-SCNC: 12 MMOL/L — SIGNIFICANT CHANGE UP (ref 5–17)
BUN SERPL-MCNC: 13 MG/DL — SIGNIFICANT CHANGE UP (ref 8–20)
CALCIUM SERPL-MCNC: 9.8 MG/DL — SIGNIFICANT CHANGE UP (ref 8.6–10.2)
CHLORIDE SERPL-SCNC: 97 MMOL/L — LOW (ref 98–107)
CO2 SERPL-SCNC: 30 MMOL/L — HIGH (ref 22–29)
CREAT SERPL-MCNC: 0.87 MG/DL — SIGNIFICANT CHANGE UP (ref 0.5–1.3)
GLUCOSE BLDC GLUCOMTR-MCNC: 121 MG/DL — HIGH (ref 70–99)
GLUCOSE BLDC GLUCOMTR-MCNC: 132 MG/DL — HIGH (ref 70–99)
GLUCOSE BLDC GLUCOMTR-MCNC: 137 MG/DL — HIGH (ref 70–99)
GLUCOSE BLDC GLUCOMTR-MCNC: 148 MG/DL — HIGH (ref 70–99)
GLUCOSE SERPL-MCNC: 100 MG/DL — SIGNIFICANT CHANGE UP (ref 70–115)
MAGNESIUM SERPL-MCNC: 1.7 MG/DL — SIGNIFICANT CHANGE UP (ref 1.6–2.6)
PHOSPHATE SERPL-MCNC: 3 MG/DL — SIGNIFICANT CHANGE UP (ref 2.4–4.7)
POTASSIUM SERPL-MCNC: 3.8 MMOL/L — SIGNIFICANT CHANGE UP (ref 3.5–5.3)
POTASSIUM SERPL-SCNC: 3.8 MMOL/L — SIGNIFICANT CHANGE UP (ref 3.5–5.3)
SODIUM SERPL-SCNC: 139 MMOL/L — SIGNIFICANT CHANGE UP (ref 135–145)

## 2018-09-24 PROCEDURE — 99232 SBSQ HOSP IP/OBS MODERATE 35: CPT

## 2018-09-24 PROCEDURE — 78320: CPT | Mod: 26

## 2018-09-24 PROCEDURE — 78306 BONE IMAGING WHOLE BODY: CPT | Mod: 26

## 2018-09-24 RX ORDER — MAGNESIUM SULFATE 500 MG/ML
2 VIAL (ML) INJECTION
Qty: 0 | Refills: 0 | Status: DISCONTINUED | OUTPATIENT
Start: 2018-09-24 | End: 2018-09-24

## 2018-09-24 RX ORDER — MAGNESIUM SULFATE 500 MG/ML
2 VIAL (ML) INJECTION
Qty: 0 | Refills: 0 | Status: COMPLETED | OUTPATIENT
Start: 2018-09-24 | End: 2018-09-24

## 2018-09-24 RX ORDER — POTASSIUM CHLORIDE 20 MEQ
20 PACKET (EA) ORAL ONCE
Qty: 0 | Refills: 0 | Status: COMPLETED | OUTPATIENT
Start: 2018-09-24 | End: 2018-09-24

## 2018-09-24 RX ADMIN — Medication 50 GRAM(S): at 14:22

## 2018-09-24 RX ADMIN — Medication 50 GRAM(S): at 18:27

## 2018-09-24 RX ADMIN — Medication 20 MILLIEQUIVALENT(S): at 14:21

## 2018-09-24 RX ADMIN — ATORVASTATIN CALCIUM 80 MILLIGRAM(S): 80 TABLET, FILM COATED ORAL at 21:17

## 2018-09-24 RX ADMIN — PANTOPRAZOLE SODIUM 40 MILLIGRAM(S): 20 TABLET, DELAYED RELEASE ORAL at 06:15

## 2018-09-24 RX ADMIN — ENOXAPARIN SODIUM 40 MILLIGRAM(S): 100 INJECTION SUBCUTANEOUS at 14:24

## 2018-09-24 RX ADMIN — Medication 650 MILLIGRAM(S): at 14:35

## 2018-09-24 RX ADMIN — Medication 650 MILLIGRAM(S): at 15:30

## 2018-09-24 RX ADMIN — Medication 250 MILLIGRAM(S): at 06:14

## 2018-09-24 RX ADMIN — Medication 250 MILLIGRAM(S): at 18:28

## 2018-09-24 RX ADMIN — Medication 325 MILLIGRAM(S): at 14:20

## 2018-09-24 NOTE — PHYSICAL THERAPY INITIAL EVALUATION ADULT - IMPAIRMENTS FOUND, PT EVAL
arousal, attention, and cognition/muscle strength/gait, locomotion, and balance/poor safety awareness

## 2018-09-24 NOTE — PROGRESS NOTE ADULT - SUBJECTIVE AND OBJECTIVE BOX
[facility]  INFECTIOUS DISEASES AND INTERNAL MEDICINE at [place]  =======================================================  José Luis Walton MD  Diplomates American Board of Internal Medicine and Infectious Diseases  =======================================================    YAMIL OLEA [id number]    Follow up: [facility] Septicemia    no fevers, no new complaints.     Allergies:  No Known Allergies    Antibiotics:  cefTAZidime/avibactam IVPB      cefTAZidime/avibactam IVPB 2.5 Gram(s) IV Intermittent every 8 hours      Other medications:  acetaminophen   Tablet .. 650 milliGRAM(s) Oral every 6 hours PRN  aluminum hydroxide/magnesium hydroxide/simethicone Suspension 30 milliLiter(s) Oral every 6 hours PRN  aspirin enteric coated 325 milliGRAM(s) Oral daily  atorvastatin 80 milliGRAM(s) Oral at bedtime  dextrose 40% Gel 15 Gram(s) Oral once PRN  dextrose 5%. 1000 milliLiter(s) IV Continuous <Continuous>  dextrose 50% Injectable 12.5 Gram(s) IV Push once  dextrose 50% Injectable 25 Gram(s) IV Push once  dextrose 50% Injectable 25 Gram(s) IV Push once  enoxaparin Injectable 40 milliGRAM(s) SubCutaneous daily  glucagon  Injectable 1 milliGRAM(s) IntraMuscular once PRN  insulin lispro (HumaLOG) corrective regimen sliding scale   SubCutaneous Before meals and at bedtime  magnesium sulfate  IVPB 2 Gram(s) IV Intermittent every 2 hours  pantoprazole    Tablet 40 milliGRAM(s) Oral before breakfast  saccharomyces boulardii 250 milliGRAM(s) Oral two times a day       REVIEW OF SYSTEMS:  Unable to assess, not answering all question    Physical Exam:  Vital Signs Last 24 Hrs  T(C): 37.2 (24 Sep 2018 15:37), Max: 37.2 (24 Sep 2018 15:37)  T(F): 98.9 (24 Sep 2018 15:37), Max: 98.9 (24 Sep 2018 15:37)  HR: 74 (24 Sep 2018 15:37) (74 - 74)  BP: 140/66 (24 Sep 2018 15:37) (140/66 - 140/66)  RR: 18 (24 Sep 2018 15:37) (18 - 18)  SpO2: 94% (24 Sep 2018 15:37) (94% - 94%)    GEN: NAD  HEENT: normocephalic and atraumatic. EOMI. PERRL.    NECK: Supple.   LUNGS: Clear to auscultation.  HEART: Regular rate and rhythm   ABDOMEN: Soft, nontender, and nondistended.  Positive bowel sounds.    : No CVA tenderness  EXTREMITIES: Left leg in immobilizer   MSK: no joint swelling  NEUROLOGIC: Awake, minimal verbalization    PSYCHIATRIC: Unable to assess   SKIN: No Rash      Labs:                        9.2    7.7   )-----------( 261      ( 23 Sep 2018 07:46 )             29.6     09-24    139  |  97<L>  |  13.0  ----------------------------<  100  3.8   |  30.0<H>  |  0.87    Ca    9.8      24 Sep 2018 06:38  Phos  3.0     09-24  Mg     1.7     09-24          Culture - Sensi (Special) (collected 09-20-18 @ 18:15)  Source: .Other    Culture - Blood (collected 09-20-18 @ 08:00)  Source: .Blood Blood    Culture - Blood (collected 09-20-18 @ 07:59)  Source: .Blood Blood    Culture - Blood (collected 09-18-18 @ 17:26)  Source: .Blood Blood-Venous  Final Report (09-23-18 @ 19:01):    No growth at 5 days.    Culture - Blood (collected 09-18-18 @ 17:26)  Source: .Blood Blood-Peripheral  Final Report (09-23-18 @ 19:00):    No growth at 5 days.    Culture - Urine (collected 09-17-18 @ 03:04)  Source: .Urine Catheterized  Final Report (09-19-18 @ 09:03):    50,000 - 99,000 CFU/mL Klebsiella pneumoniae (Carbapenem Resistant)    ,    TYPE: (C=Critical, N=Notification, A=Abnormal) c    TESTS:  _ K pneumo MDRO    DATE/TIME CALLED: _ 09/19/2018 09:02:27    CALLED TO: Jomar stokes rn    READ BACK (2 Patient Identifiers)(Y/N): _ y    READ BACK VALUES (Y/N): _ y    CALLED BY: Jomar wang copy to ic pt log  Organism: Klepne MDRO (09-19-18 @ 09:03)  Organism: Klepne MDRO (09-19-18 @ 09:03)    Sensitivities:      -  Amikacin: R >32      -  Ampicillin: R >16 These ampicillin results predict results for amoxicillin      -  Ampicillin/Sulbactam: R >16/8      -  Aztreonam: R >16      -  Cefazolin: R >16 For uncomplicated UTI with K. pneumoniae, E. coli, or P. mirablis: PRASHANTH <=16 is sensitive and PRASHANTH >=32 is resistant. This also predicts results for oral agents cefaclor, cefdinir, cefpodoxime, cefprozil, cefuroxime axetil, cephalexin and locarbef for uncomplicated UTI. Note that some isolates may be susceptible to these agents while testing resistant to cefazolin.      -  Cefepime: R >16      -  Cefoxitin: I 16      -  Ceftriaxone: R >32 Enterobacter, Citrobacter, and Serratia may develop resistance during prolonged therapy      -  Ciprofloxacin: R >2      -  Ertapenem: R >4      -  Gentamicin: S <=4      -  Imipenem: R >8      -  Levofloxacin: R >4      -  Meropenem: R >8      -  Nitrofurantoin: R >64 Should not be used to treat pyelonephritis      -  Piperacillin/Tazobactam: R >64      -  Tigecycline: S <=2      -  Tobramycin: R >8      -  Trimethoprim/Sulfamethoxazole: S <=2/38      Method Type: PRASHANTH    Culture - Blood (collected 09-17-18 @ 01:55)  Source: .Blood Blood  Final Report (09-22-18 @ 03:02):    No growth at 5 days.    Culture - Blood (collected 09-17-18 @ 01:53)  Source: .Blood Blood  Final Report (09-22-18 @ 12:20):    Growth in aerobic bottle: Klebsiella pneumoniae (Carbapenem Resistant)    Aerobic Bottle: 22.04 Hours to positivity    Anaerobic Bottle: No growth at 5 days.    .    ***Blood Panel PCR results on this specimen are available    approximately 3 hours after theGram stain result.***    Gram stain, PCR, and/or culture results may not always    correspond due to difference in methodologies.    ************************************************************    This PCR assay was performed using [employer].    The following targets are tested for: Enterococcus,    vancomycin resistant enterococci, Listeria monocytogenes,    coagulase negative staphylococci, S. aureus,    methicillin resistant S. aureus, Streptococcus agalactiae    (Group B), S. pneumoniae, S. pyogenes (Group A),    Acinetobacter baumannii, Enterobacter cloacae, E. coli,    Klebsiella oxytoca, K. pneumoniae, Proteus sp.,    Serratia marcescens, Haemophilus influenzae,    Neisseria meningitidis, Pseudomonas aeruginosa, Candida    albicans, C. glabrata, C krusei,C parapsilosis,    C. tropicalis and the KPC resistance gene.    "Due to technical problems, Proteus sp. will Not be reported as part of    the BCID panel until further notice"    .    TYPE: (C=Critical, N=Notification, A=Abnormal) C    TESTS:  _ GS    DATE/TIME CALLED: _ 09/18/2018 08:09:13    CALLED TO: Jomar Henry RN    READ BACK (2 Patient Identifiers)(Y/N): _ Y    READ BACK VALUES (Y/N): _ Y    CALLED BY: Jomar Parry    .    TYPE: (C=Critical, N=Notification, A=Abnormal) C    TESTS:  _ BLD     DATE/TIME CALLED: _ 09/19/2018 08:58:18    CALLED TO: CLAU WONG RN    READ BACK (2 Patient Identifiers)(Y/N): _ Y    READ BACK VALUES (Y/N): _ Y    CALLED BY: Jomar VALENCIA    .    Copy to Infection Control, Logistics.  Organism: Klepne MDRO  Blood Culture PCR (09-22-18 @ 12:20)  Organism: Blood Culture PCR (09-22-18 @ 12:20)    Sensitivities:      -  Klebsiella pneumoniae: Detec      -  Multidrug (KPC pos) resistant organism: Detec The KPC gene confers resistance to all penicillins, cephalosporins, carbapenems and aztreonam. Additional resistance to fluoroquinolones and aminoglycosides is likely.      Method Type: PCR  Organism: Urielpne MDRO (09-22-18 @ 12:20)    Sensitivities:      -  Polymyxin B: 0.5      Method Type: ETEST  Organism: Urielpne MDRO (09-22-18 @ 12:20)    Sensitivities:      -  Amikacin: R >32      -  Ampicillin: R >16 These ampicillin results predict results for amoxicillin      -  Ampicillin/Sulbactam: R >16/8      -  Aztreonam: R >16      -  Cefazolin: R >16      -  Cefepime: R >16      -  Cefoxitin: I 16      -  Ceftriaxone: R >32 Enterobacter, Citrobacter, and Serratia may develop resistance during prolonged therapy      -  Ciprofloxacin: R >2      -  Ertapenem: R >4      -  Gentamicin: S <=4      -  Imipenem: R >8      -  Levofloxacin: R >4      -  Meropenem: R >8      -  Piperacillin/Tazobactam: R >64      -  Tigecycline: S <=2      -  Tobramycin: R >8      -  Trimethoprim/Sulfamethoxazole: S <=2/38      Method Type: PRASHANTH        Rapid RVP

## 2018-09-24 NOTE — PHYSICAL THERAPY INITIAL EVALUATION ADULT - IMPAIRMENTS CONTRIBUTING TO GAIT DEVIATIONS, PT EVAL
decreased ROM/impaired postural control/impaired motor control/impaired balance/cognition/impaired coordination

## 2018-09-24 NOTE — PHYSICAL THERAPY INITIAL EVALUATION ADULT - ADDITIONAL COMMENTS
Pt to be d/c'd to his Niece's home where he will have family support at all times. No steps to navigate. Pt was independent prior to LLE fx with [facility]. Was sent to [facility] where he had difficulty participating 2*2 MR. Pt was d/c'd Home doing slide board transfers with family assist however, per his niece at bedside they only have a hospital bed and pt's cane at home, no other DME? Pt to be d/c'd to his Niece's home where he will have family support at all times. No steps to navigate. Pt was independent prior to LLE fx with [facility]. Was sent to [facility] where he had difficulty participating 2*2 MR. Pt was d/c'd Home doing slide board transfers with family assist however, per his niece at bedside they only have a hospital bed and pt's cane at home, no other DME

## 2018-09-24 NOTE — PROGRESS NOTE ADULT - SUBJECTIVE AND OBJECTIVE BOX
is a 69 y/o M with h/o MR, HTN, DM Type 2, dyslipidemia, recent Left tibial plateau fracture. He was admitted with AMS and Sepsis with shock present at admission requiring pressors and admitted to MICU. His BOUCHRA is secondary to ATN present at admission. He was treated with broad spectrum antibiotics for sepsis, secondary to UTI.  He was found to have UTI and Bacteremia with carbapenem resistant klebsiella.    He's on ceftazidime, doing fine. He will need PICC line and his family wants him home on IV antibiotics during discharge. I appreciate ID consult and recs.    He is doing well, needs to go home with PT.     Summary:   REVIEW OF SYSTEMS    General: "doing okay."    Skin/Breast:  	  Ophthalmologic:  	  ENMT:	    Respiratory and Thorax:  	  Cardiovascular:	    Gastrointestinal:	+belly pain    Genitourinary:	    Musculoskeletal:	 +left  foot pain    Neurological:	    Psychiatric:	    Hematology/Lymphatics:	    Endocrine:	      Vital Signs Last 24 Hrs  T(C): 36.7 (23 Sep 2018 16:20), Max: 36.7 (23 Sep 2018 16:20)  T(F): 98 (23 Sep 2018 16:20), Max: 98 (23 Sep 2018 16:20)  HR: 75 (23 Sep 2018 16:20) (75 - 75)  BP: 116/63 (23 Sep 2018 16:20) (116/63 - 116/63)  BP(mean): --  RR: 18 (23 Sep 2018 16:20) (18 - 18)  SpO2: 95% (23 Sep 2018 16:20) (95% - 95%)  GENERAL: NAD, calm  HEAD:  Atraumatic, Normocephalic  NECK: Supple, No JVD, Normal thyroid  NERVOUS SYSTEM:  Alert, Motor Strength 5/5 B/L upper and lower extremities  CHEST/LUNG: Clear to auscultation bilaterally; No rales, rhonchi, wheezing, or rubs  HEART: Regular rate and rhythm; No murmurs, rubs, or gallops  ABDOMEN: Soft, Nontender, Nondistended; Bowel sounds present  EXTREMITIES:  2+ Peripheral Pulses, No clubbing, cyanosis, or edema; LLE with brace in place, +distal pulse                                          9.2    7.7   )-----------( 261      ( 23 Sep 2018 07:46 )             29.6     09-24    139  |  97<L>  |  13.0  ----------------------------<  100  3.8   |  30.0<H>  |  0.87    Ca    9.8      24 Sep 2018 06:38  Phos  3.0     09-24  Mg     1.7     09-24    Radiology:     MEDICATIONS  (STANDING):  aspirin enteric coated 325 milliGRAM(s) Oral daily  atorvastatin 80 milliGRAM(s) Oral at bedtime  cefTAZidime/avibactam IVPB      cefTAZidime/avibactam IVPB 2.5 Gram(s) IV Intermittent every 8 hours  dextrose 5%. 1000 milliLiter(s) (50 mL/Hr) IV Continuous <Continuous>  dextrose 50% Injectable 12.5 Gram(s) IV Push once  dextrose 50% Injectable 25 Gram(s) IV Push once  dextrose 50% Injectable 25 Gram(s) IV Push once  enoxaparin Injectable 40 milliGRAM(s) SubCutaneous daily  insulin lispro (HumaLOG) corrective regimen sliding scale   SubCutaneous Before meals and at bedtime  magnesium sulfate  IVPB 2 Gram(s) IV Intermittent every 2 hours  pantoprazole    Tablet 40 milliGRAM(s) Oral before breakfast  potassium chloride    Tablet ER 20 milliEquivalent(s) Oral once  saccharomyces boulardii 250 milliGRAM(s) Oral two times a day    MEDICATIONS  (PRN):  acetaminophen   Tablet .. 650 milliGRAM(s) Oral every 6 hours PRN Moderate Pain (4 - 6)  aluminum hydroxide/magnesium hydroxide/simethicone Suspension 30 milliLiter(s) Oral every 6 hours PRN Dyspepsia  dextrose 40% Gel 15 Gram(s) Oral once PRN Blood Glucose LESS THAN 70 milliGRAM(s)/deciliter  glucagon  Injectable 1 milliGRAM(s) IntraMuscular once PRN Glucose LESS THAN 70 milligrams/deciliter

## 2018-09-24 NOTE — PHYSICAL THERAPY INITIAL EVALUATION ADULT - LEVEL OF INDEPENDENCE: BED TO CHAIR, REHAB EVAL
unable to perform Pt unable to maintain NWB LLE, Slide board not attempted as Pt was scheduled to go to [place] for test Per RN. Will attempt next session./unable to perform

## 2018-09-24 NOTE — PHYSICAL THERAPY INITIAL EVALUATION ADULT - PERTINENT HX OF CURRENT PROBLEM, REHAB EVAL
69 y/o M h/o MR, HTN, DM Type 2, dyslipidemia, recent Left tibial plateau fracture. Admitted with AMS and Sepsis with shock requiring pressors and admitted to MICU. Pt was treated with broad spectrum antibiotics for sepsis, secondary to UTI, Seen by Ortho as well for LLE tibial Plateau Fx, they recommend NWB LLE and remain in [place] Brace.

## 2018-09-24 NOTE — PHYSICAL THERAPY INITIAL EVALUATION ADULT - GAIT DISTANCE, PT EVAL
1 step, pt instructed on hopping but unable to maintain NWB LLE. Functional assessment stopped and pt assisted to sitting at EOB.

## 2018-09-24 NOTE — PHYSICAL THERAPY INITIAL EVALUATION ADULT - CRITERIA FOR SKILLED THERAPEUTIC INTERVENTIONS
Home with 24/7 supervision/assist, slide board transfers, Home PT, pt will need RW for home PT, w/c and commode/anticipated equipment needs at discharge/anticipated discharge recommendation/impairments found

## 2018-09-24 NOTE — PHYSICAL THERAPY INITIAL EVALUATION ADULT - RANGE OF MOTION EXAMINATION, REHAB EVAL
LLE in [place] brace/Right LE ROM was WFL (within functional limits)/bilateral upper extremity ROM was WFL (within functional limits)

## 2018-09-25 LAB
ANION GAP SERPL CALC-SCNC: 14 MMOL/L — SIGNIFICANT CHANGE UP (ref 5–17)
BUN SERPL-MCNC: 12 MG/DL — SIGNIFICANT CHANGE UP (ref 8–20)
CALCIUM SERPL-MCNC: 9.6 MG/DL — SIGNIFICANT CHANGE UP (ref 8.6–10.2)
CHLORIDE SERPL-SCNC: 97 MMOL/L — LOW (ref 98–107)
CO2 SERPL-SCNC: 28 MMOL/L — SIGNIFICANT CHANGE UP (ref 22–29)
CREAT SERPL-MCNC: 0.9 MG/DL — SIGNIFICANT CHANGE UP (ref 0.5–1.3)
CULTURE RESULTS: SIGNIFICANT CHANGE UP
CULTURE RESULTS: SIGNIFICANT CHANGE UP
GLUCOSE BLDC GLUCOMTR-MCNC: 109 MG/DL — HIGH (ref 70–99)
GLUCOSE BLDC GLUCOMTR-MCNC: 116 MG/DL — HIGH (ref 70–99)
GLUCOSE BLDC GLUCOMTR-MCNC: 116 MG/DL — HIGH (ref 70–99)
GLUCOSE BLDC GLUCOMTR-MCNC: 134 MG/DL — HIGH (ref 70–99)
GLUCOSE SERPL-MCNC: 117 MG/DL — HIGH (ref 70–115)
HCT VFR BLD CALC: 31.6 % — LOW (ref 42–52)
HGB BLD-MCNC: 9.7 G/DL — LOW (ref 14–18)
MAGNESIUM SERPL-MCNC: 2 MG/DL — SIGNIFICANT CHANGE UP (ref 1.6–2.6)
MCHC RBC-ENTMCNC: 27.6 PG — SIGNIFICANT CHANGE UP (ref 27–31)
MCHC RBC-ENTMCNC: 30.7 G/DL — LOW (ref 32–36)
MCV RBC AUTO: 89.8 FL — SIGNIFICANT CHANGE UP (ref 80–94)
PHOSPHATE SERPL-MCNC: 2.7 MG/DL — SIGNIFICANT CHANGE UP (ref 2.4–4.7)
PLATELET # BLD AUTO: 315 K/UL — SIGNIFICANT CHANGE UP (ref 150–400)
POTASSIUM SERPL-MCNC: 3.8 MMOL/L — SIGNIFICANT CHANGE UP (ref 3.5–5.3)
POTASSIUM SERPL-SCNC: 3.8 MMOL/L — SIGNIFICANT CHANGE UP (ref 3.5–5.3)
RBC # BLD: 3.52 M/UL — LOW (ref 4.6–6.2)
RBC # FLD: 16.6 % — HIGH (ref 11–15.6)
SODIUM SERPL-SCNC: 139 MMOL/L — SIGNIFICANT CHANGE UP (ref 135–145)
SPECIMEN SOURCE: SIGNIFICANT CHANGE UP
SPECIMEN SOURCE: SIGNIFICANT CHANGE UP
WBC # BLD: 7.6 K/UL — SIGNIFICANT CHANGE UP (ref 4.8–10.8)
WBC # FLD AUTO: 7.6 K/UL — SIGNIFICANT CHANGE UP (ref 4.8–10.8)

## 2018-09-25 PROCEDURE — 99232 SBSQ HOSP IP/OBS MODERATE 35: CPT

## 2018-09-25 PROCEDURE — 76937 US GUIDE VASCULAR ACCESS: CPT | Mod: 26

## 2018-09-25 PROCEDURE — 36569 INSJ PICC 5 YR+ W/O IMAGING: CPT

## 2018-09-25 PROCEDURE — 71045 X-RAY EXAM CHEST 1 VIEW: CPT | Mod: 26

## 2018-09-25 PROCEDURE — 99239 HOSP IP/OBS DSCHRG MGMT >30: CPT

## 2018-09-25 RX ORDER — MELOXICAM 15 MG/1
1 TABLET ORAL
Qty: 0 | Refills: 0 | COMMUNITY

## 2018-09-25 RX ORDER — CEFTAZIDIME, AVIBACTAM 2; .5 G/1; G/1
2.5 POWDER, FOR SOLUTION INTRAVENOUS
Qty: 20 | Refills: 0 | OUTPATIENT
Start: 2018-09-25

## 2018-09-25 RX ADMIN — ENOXAPARIN SODIUM 40 MILLIGRAM(S): 100 INJECTION SUBCUTANEOUS at 11:25

## 2018-09-25 RX ADMIN — Medication 250 MILLIGRAM(S): at 22:11

## 2018-09-25 RX ADMIN — PANTOPRAZOLE SODIUM 40 MILLIGRAM(S): 20 TABLET, DELAYED RELEASE ORAL at 05:42

## 2018-09-25 RX ADMIN — Medication 650 MILLIGRAM(S): at 11:26

## 2018-09-25 RX ADMIN — Medication 325 MILLIGRAM(S): at 11:25

## 2018-09-25 RX ADMIN — Medication 250 MILLIGRAM(S): at 05:42

## 2018-09-25 RX ADMIN — Medication 650 MILLIGRAM(S): at 12:20

## 2018-09-25 RX ADMIN — ATORVASTATIN CALCIUM 80 MILLIGRAM(S): 80 TABLET, FILM COATED ORAL at 22:11

## 2018-09-25 NOTE — DISCHARGE NOTE ADULT - HOSPITAL COURSE
is a 69 y/o M with h/o MR, HTN, DM Type 2, dyslipidemia, recent Left tibial plateau fracture. He was admitted with AMS and Sepsis with shock present at admission requiring pressors and admitted to MICU. His BOUCHRA is secondary to ATN present at admission, that improved. He was treated with broad spectrum antibiotics for sepsis, secondary to UTI.  He was found to have UTI and Bacteremia with carbapenem resistant klebsiella.  He's on ceftazidime. He will need PICC line and his family wants him home on IV antibiotics during discharge. PT evaluated patient and recommended home PT.  Patient is stable for discharge to home with home care after PICC line placed.

## 2018-09-25 NOTE — DISCHARGE NOTE ADULT - SECONDARY DIAGNOSIS.
Diabetes mellitus of other type with complication BOUCHRA (acute kidney injury) Encephalopathy Reflux gastritis Hyperlipidemia, unspecified hyperlipidemia type

## 2018-09-25 NOTE — DISCHARGE NOTE ADULT - CARE PROVIDER_API CALL
Jaime Caicedo), Internal Medicine  [address] [place] [address]  Phone: [phone]  Fax: [phone]    Mckay Leggett), Infectious Disease; Internal Medicine  [address] [facility]  [address]  [place] [address]  Phone: [phone]  Fax: [phone]

## 2018-09-25 NOTE — DISCHARGE NOTE ADULT - MEDICATION SUMMARY - MEDICATIONS TO TAKE
I will START or STAY ON the medications listed below when I get home from the hospital:    acetaminophen 325 mg oral tablet  -- 2 tab(s) by mouth every 6 hours, As needed, For Temp greater than 38 C (100.4 F)  -- Indication: For Pain    aspirin 325 mg oral delayed release tablet  -- 1 tab(s) by mouth once a day  -- Indication: For Home med    aluminum hydroxide-magnesium hydroxide 200 mg-200 mg/5 mL oral suspension  -- 30 milliliter(s) by mouth every 6 hours, As needed, Dyspepsia  -- Indication: For Dyspepsia    metFORMIN 1000 mg oral tablet  -- 1 tab(s) by mouth 2 times a day  -- Indication: For Diabetes mellitus of other type with complication    Crestor 20 mg oral tablet  -- 1 tab(s) by mouth once a day (at bedtime)  -- Indication: For Hyperlipidemia    ceftazidime-avibactam 2 g-0.5 g intravenous injection  -- 2.5 gram(s) intravenous every 8 hours until 10/1/18      -- Indication: For Bacteremia    simethicone 80 mg oral tablet, chewable  -- 1 tab(s) by mouth 4 times a day  -- Indication: For Dyspepsia    pantoprazole 40 mg oral delayed release tablet  -- 1 tab(s) by mouth once a day (before a meal)  -- Indication: For Dyspepsia    folic acid 1 mg oral tablet  -- 1 tab(s) by mouth once a day  -- Indication: For Supplement I will START or STAY ON the medications listed below when I get home from the hospital:    acetaminophen 325 mg oral tablet  -- 2 tab(s) by mouth every 6 hours, As needed, For Temp greater than 38 C (100.4 F)  -- Indication: For Pain    aspirin 325 mg oral delayed release tablet  -- 1 tab(s) by mouth once a day  -- Indication: For Home med    Vicodin 5 mg-300 mg oral tablet  -- 1 tab(s) by mouth every 6 hours, As Needed MDD:4 tabs  -- Indication: For Pain - home med    aluminum hydroxide-magnesium hydroxide 200 mg-200 mg/5 mL oral suspension  -- 30 milliliter(s) by mouth every 6 hours, As needed, Dyspepsia  -- Indication: For Dyspepsia    metFORMIN 1000 mg oral tablet  -- 1 tab(s) by mouth 2 times a day  -- Indication: For Diabetes mellitus of other type with complication    Crestor 20 mg oral tablet  -- 1 tab(s) by mouth once a day (at bedtime)  -- Indication: For Hyperlipidemia    ceftazidime-avibactam 2 g-0.5 g intravenous injection  -- 2.5 gram(s) intravenous every 8 hours until 10/1/18      -- Indication: For Bacteremia    simethicone 80 mg oral tablet, chewable  -- 1 tab(s) by mouth 4 times a day  -- Indication: For Dyspepsia    pantoprazole 40 mg oral delayed release tablet  -- 1 tab(s) by mouth once a day (before a meal)  -- Indication: For Dyspepsia    folic acid 1 mg oral tablet  -- 1 tab(s) by mouth once a day  -- Indication: For Supplement

## 2018-09-25 NOTE — DISCHARGE NOTE ADULT - CARE PLAN
Principal Discharge DX:	Carbapenem-resistant Klebsiella pneumoniae infection  Goal:	Improved  Assessment and plan of treatment:	Complete antibiotic dose.  Follow up with ID in 1 week.  Secondary Diagnosis:	Diabetes mellitus of other type with complication  Assessment and plan of treatment:	Continue current medications as prescribed.  Follow up with primary doctor.  Secondary Diagnosis:	BOUCHRA (acute kidney injury)  Assessment and plan of treatment:	Continue current medications as prescribed.  Follow up with primary doctor.  Secondary Diagnosis:	Encephalopathy  Assessment and plan of treatment:	Improved  Secondary Diagnosis:	Reflux gastritis  Assessment and plan of treatment:	Continue current medications as prescribed.  Follow up with primary doctor.  Secondary Diagnosis:	Hyperlipidemia, unspecified hyperlipidemia type  Assessment and plan of treatment:	Continue current medications as prescribed.  Follow up with primary doctor.

## 2018-09-25 NOTE — PROGRESS NOTE ADULT - ATTENDING COMMENTS
68 year old male with PMH MRDD, HTN, DMT2, dyslipidemia and Left tibial plateau fracture was sent from Rehab with AMS and fever. Sepsis with shock present at admission requiring pressors and admitted to MICU. BOUCHRA secondary to ATN present at admission - resolved.  Patient was seen and examined at bedside today for bacteremia.  Confused, combative - improved when family at bedside.  Culture show MDR - change to Tygacil  ID consult
68 year old male with PMH MRDD, HTN, DMT2, dyslipidemia and Left tibial plateau fracture was sent from Rehab with AMS and fever. Sepsis with shock present at admission requiring pressors and admitted to MICU. BOUCHRA secondary to ATN present at admission - resolved.  Patient was seen and examined at bedside today for bacteremia.  Less confused, no longer combative, 1:1 at bedside. Appears clinically improved  Culture show MDR Klebsiella pneumonia- changed to Avicaz, Polymyxin by ID.   Repeat culture pending.  Discussed with family at bedside
68 year old male with PMH MRDD, HTN, DMT2, dyslipidemia and Left tibial plateau fracture was sent from Rehab with AMS and fever. Sepsis with shock present at admission requiring pressors and admitted to MICU. BOUCHRA secondary to ATN present at admission - resolved.  Patient was seen and examined at bedside today for bacteremia.  Less confused, no longer combative, 1:1 at bedside.  Culture show MDR - change to Avicaz, Polymyxin by ID.   Follow repeat culture
Will Follow
Will Follow
anticipate discharge to facility
I agree w/above, plan is d/c home with PICC and abx.

## 2018-09-25 NOTE — DISCHARGE NOTE ADULT - MEDICATION SUMMARY - MEDICATIONS TO STOP TAKING
I will STOP taking the medications listed below when I get home from the hospital:    meloxicam 15 mg oral tablet  -- 1 tab(s) by mouth once a day    Vicodin 5 mg-300 mg oral tablet  -- 1 tab(s) by mouth every 6 hours, As Needed    losartan 50 mg oral tablet  -- 1 tab(s) by mouth once a day    enoxaparin  -- 40 milligram(s) subcutaneous once a day    saccharomyces boulardii lyo 250 mg oral capsule  -- 1 cap(s) by mouth 2 times a day, stop on 09/02    levoFLOXacin 500 mg oral tablet  -- 1 tab(s) by mouth every 24 hours

## 2018-09-25 NOTE — PROGRESS NOTE ADULT - SUBJECTIVE AND OBJECTIVE BOX
CC: Altered MS, Carbapenem resistant bacteremia/UTI    HPI:   is a 67 y/o M with h/o MR, HTN, DM Type 2, dyslipidemia, recent Left tibial plateau fracture. He was admitted with AMS and Sepsis with shock present at admission requiring pressors and admitted to MICU. His BOUCHRA is secondary to ATN present at admission. He was treated with broad spectrum antibiotics for sepsis, secondary to UTI.  He was found to have UTI and Bacteremia with carbapenem resistant klebsiella.  He's on ceftazidime. He will need PICC line and his family wants him home on IV antibiotics during discharge.     INTERVAL HPI/OVERNIGHT EVENTS: Patient seen and examined lying in bed. Patient appears comfortable.  No acute events overnight.  ROS limited secondary to MR.    Vital Signs Last 24 Hrs  T(C): 36.4 (25 Sep 2018 08:00), Max: 37.2 (24 Sep 2018 15:37)  T(F): 97.6 (25 Sep 2018 08:00), Max: 99 (24 Sep 2018 23:44)  HR: 75 (25 Sep 2018 08:00) (74 - 82)  BP: 167/75 (25 Sep 2018 08:00) (126/67 - 167/75)  BP(mean): --  RR: 18 (25 Sep 2018 08:00) (18 - 18)  SpO2: 95% (25 Sep 2018 08:00) (94% - 96%)  I&O's Detail    24 Sep 2018 07:01  -  25 Sep 2018 07:00  --------------------------------------------------------  IN:  Total IN: 0 mL    OUT:    Voided: 1250 mL  Total OUT: 1250 mL    Total NET: -1250 mL      25 Sep 2018 07:01  -  25 Sep 2018 11:13  --------------------------------------------------------  IN:  Total IN: 0 mL    OUT:    Voided: 375 mL  Total OUT: 375 mL    Total NET: -375 mL      PHYSICAL EXAM:  GENERAL: NAD  HEAD:  Atraumatic, Normocephalic  NECK: Supple, No JVD, Normal thyroid  NERVOUS SYSTEM:  Alert, Good concentration; generalized weakness  CHEST/LUNG: Clear to auscultation bilaterally; No rales, rhonchi, wheezing, or rubs  HEART: Regular rate and rhythm; No murmurs, rubs, or gallops  ABDOMEN: Soft, Nontender, distended; Bowel sounds present  EXTREMITIES:  2+ Peripheral Pulses, No clubbing, cyanosis, or edema                            9.7    7.6   )-----------( 315      ( 25 Sep 2018 06:52 )             31.6     25 Sep 2018 06:52    139    |  97     |  12.0   ----------------------------<  117    3.8     |  28.0   |  0.90     Ca    9.6        25 Sep 2018 06:52  Phos  2.7       25 Sep 2018 06:52  Mg     2.0       25 Sep 2018 06:52        CAPILLARY BLOOD GLUCOSE  POCT Blood Glucose.: 116 mg/dL (25 Sep 2018 07:50)  POCT Blood Glucose.: 137 mg/dL (24 Sep 2018 21:28)  POCT Blood Glucose.: 148 mg/dL (24 Sep 2018 17:53)  POCT Blood Glucose.: 132 mg/dL (24 Sep 2018 14:22)          MEDICATIONS  (STANDING):  aspirin enteric coated 325 milliGRAM(s) Oral daily  atorvastatin 80 milliGRAM(s) Oral at bedtime  cefTAZidime/avibactam IVPB      cefTAZidime/avibactam IVPB 2.5 Gram(s) IV Intermittent every 8 hours  dextrose 5%. 1000 milliLiter(s) (50 mL/Hr) IV Continuous <Continuous>  dextrose 50% Injectable 12.5 Gram(s) IV Push once  dextrose 50% Injectable 25 Gram(s) IV Push once  dextrose 50% Injectable 25 Gram(s) IV Push once  enoxaparin Injectable 40 milliGRAM(s) SubCutaneous daily  insulin lispro (HumaLOG) corrective regimen sliding scale   SubCutaneous Before meals and at bedtime  pantoprazole    Tablet 40 milliGRAM(s) Oral before breakfast  saccharomyces boulardii 250 milliGRAM(s) Oral two times a day    MEDICATIONS  (PRN):  acetaminophen   Tablet .. 650 milliGRAM(s) Oral every 6 hours PRN Moderate Pain (4 - 6)  aluminum hydroxide/magnesium hydroxide/simethicone Suspension 30 milliLiter(s) Oral every 6 hours PRN Dyspepsia  dextrose 40% Gel 15 Gram(s) Oral once PRN Blood Glucose LESS THAN 70 milliGRAM(s)/deciliter  glucagon  Injectable 1 milliGRAM(s) IntraMuscular once PRN Glucose LESS THAN 70 milligrams/deciliter

## 2018-09-25 NOTE — DISCHARGE NOTE ADULT - PLAN OF CARE
Improved Complete antibiotic dose.  Follow up with ID in 1 week. Continue current medications as prescribed.  Follow up with primary doctor.

## 2018-09-25 NOTE — PROCEDURE NOTE - NSPROCDETAILS_GEN_ALL_CORE
sterile dressing applied/sterile technique, catheter placed/location identified, draped/prepped, sterile technique used/supine position/ultrasound guidance

## 2018-09-25 NOTE — PROGRESS NOTE ADULT - SUBJECTIVE AND OBJECTIVE BOX
[facility]  INFECTIOUS DISEASES AND INTERNAL MEDICINE at [place]  =======================================================  José Luis Inman MD [facility]   Marco A Walton MD  Diplomates American Board of Internal Medicine and Infectious Diseases  =======================================================    YAMIL OLEA [id number]  chief complaint: follow up on [facility] Septicemia  patient seen and examined in follow up.  Chart and labs reviewed.     blood cultures negative since 9/18/18  =======================================================  Allergies:  No Known Allergies      =======================================================  Antibiotics:  cefTAZidime/avibactam IVPB      cefTAZidime/avibactam IVPB 2.5 Gram(s) IV Intermittent every 8 hours      Other medications:  acetaminophen   Tablet .. 650 milliGRAM(s) Oral every 6 hours PRN  aluminum hydroxide/magnesium hydroxide/simethicone Suspension 30 milliLiter(s) Oral every 6 hours PRN  aspirin enteric coated 325 milliGRAM(s) Oral daily  atorvastatin 80 milliGRAM(s) Oral at bedtime  dextrose 40% Gel 15 Gram(s) Oral once PRN  dextrose 5%. 1000 milliLiter(s) IV Continuous <Continuous>  dextrose 50% Injectable 12.5 Gram(s) IV Push once  dextrose 50% Injectable 25 Gram(s) IV Push once  dextrose 50% Injectable 25 Gram(s) IV Push once  enoxaparin Injectable 40 milliGRAM(s) SubCutaneous daily  glucagon  Injectable 1 milliGRAM(s) IntraMuscular once PRN  insulin lispro (HumaLOG) corrective regimen sliding scale   SubCutaneous Before meals and at bedtime  pantoprazole    Tablet 40 milliGRAM(s) Oral before breakfast  saccharomyces boulardii 250 milliGRAM(s) Oral two times a day       =======================================================     REVIEW OF SYSTEMS:  as above  all other ROS are negative    =======================================================    Physical Exam:  T(C): 36.4 (25 Sep 2018 08:00), Max: 37.2 (24 Sep 2018 15:37)  HR: 75 (25 Sep 2018 08:00)  BP: 167/75 (25 Sep 2018 08:00)  RR: 18 (25 Sep 2018 08:00)  SpO2: 95% (25 Sep 2018 08:00) (94% - 96%)    GEN: NAD  HEENT: normocephalic and atraumatic. EOMI. PERRL.    NECK: Supple.   LUNGS: Clear to auscultation.  HEART: Regular rate and rhythm   ABDOMEN: Soft, nontender, and nondistended.  Positive bowel sounds.    : No CVA tenderness  EXTREMITIES: Left leg in immobilizer   MSK: no joint swelling  NEUROLOGIC: Awake, minimal verbalization    PSYCHIATRIC: Unable to assess   SKIN: No Rash    =======================================================  Labs:   Labs:                        9.7    7.6   )-----------( 315      ( 25 Sep 2018 06:52 )             31.6     09-25    139  |  97<L>  |  12.0  ----------------------------<  117<H>  3.8   |  28.0  |  0.90    Ca    9.6      25 Sep 2018 06:52  Phos  2.7     09-25  Mg     2.0     09-25          Culture - Sensi (Special) (collected 09-20-18 @ 18:15)  Source: .Other    Culture - Blood (collected 09-20-18 @ 08:00)  Source: .Blood Blood  Final Report (09-25-18 @ 09:01):    No growth at 5 days.    Culture - Blood (collected 09-20-18 @ 07:59)  Source: .Blood Blood  Final Report (09-25-18 @ 09:01):    No growth at 5 days.    Culture - Blood (collected 09-18-18 @ 17:26)  Source: .Blood Blood-Venous  Final Report (09-23-18 @ 19:01):    No growth at 5 days.    Culture - Blood (collected 09-18-18 @ 17:26)  Source: .Blood Blood-Peripheral  Final Report (09-23-18 @ 19:00):    No growth at 5 days.    Culture - Urine (collected 09-17-18 @ 03:04)  Source: .Urine Catheterized  Final Report (09-19-18 @ 09:03):    50,000 - 99,000 CFU/mL Klebsiella pneumoniae (Carbapenem Resistant)    ,    TYPE: (C=Critical, N=Notification, A=Abnormal) c    TESTS:  _ K pneumo MDRO    DATE/TIME CALLED: _ 09/19/2018 09:02:27    CALLED TO: Jomar stokes rn    READ BACK (2 Patient Identifiers)(Y/N): _ y    READ BACK VALUES (Y/N): _ y    CALLED BY: Jomar wang copy to ic pt log  Organism: Klepne MDRO (09-19-18 @ 09:03)  Organism: Klepne MDRO (09-19-18 @ 09:03)    Sensitivities:      -  Amikacin: R >32      -  Ampicillin: R >16 These ampicillin results predict results for amoxicillin      -  Ampicillin/Sulbactam: R >16/8      -  Aztreonam: R >16      -  Cefazolin: R >16 For uncomplicated UTI with K. pneumoniae, E. coli, or P. mirablis: PRASHANTH <=16 is sensitive and PRASHANTH >=32 is resistant. This also predicts results for oral agents cefaclor, cefdinir, cefpodoxime, cefprozil, cefuroxime axetil, cephalexin and locarbef for uncomplicated UTI. Note that some isolates may be susceptible to these agents while testing resistant to cefazolin.      -  Cefepime: R >16      -  Cefoxitin: I 16      -  Ceftriaxone: R >32 Enterobacter, Citrobacter, and Serratia may develop resistance during prolonged therapy      -  Ciprofloxacin: R >2      -  Ertapenem: R >4      -  Gentamicin: S <=4      -  Imipenem: R >8      -  Levofloxacin: R >4      -  Meropenem: R >8      -  Nitrofurantoin: R >64 Should not be used to treat pyelonephritis      -  Piperacillin/Tazobactam: R >64      -  Tigecycline: S <=2      -  Tobramycin: R >8      -  Trimethoprim/Sulfamethoxazole: S <=2/38      Method Type: PRASHANTH    Culture - Blood (collected 09-17-18 @ 01:55)  Source: .Blood Blood  Final Report (09-22-18 @ 03:02):    No growth at 5 days.    Culture - Blood (collected 09-17-18 @ 01:53)  Source: .Blood Blood  Final Report (09-22-18 @ 12:20):    Growth in aerobic bottle: Klebsiella pneumoniae (Carbapenem Resistant)    Aerobic Bottle: 22.04 Hours to positivity    Anaerobic Bottle: No growth at 5 days.    .    ***Blood Panel PCR results on this specimen are available    approximately 3 hours after theGram stain result.***    Gram stain, PCR, and/or culture results may not always    correspond due to difference in methodologies.    ************************************************************    This PCR assay was performed using [employer].    The following targets are tested for: Enterococcus,    vancomycin resistant enterococci, Listeria monocytogenes,    coagulase negative staphylococci, S. aureus,    methicillin resistant S. aureus, Streptococcus agalactiae    (Group B), S. pneumoniae, S. pyogenes (Group A),    Acinetobacter baumannii, Enterobacter cloacae, E. coli,    Klebsiella oxytoca, K. pneumoniae, Proteus sp.,    Serratia marcescens, Haemophilus influenzae,    Neisseria meningitidis, Pseudomonas aeruginosa, Candida    albicans, C. glabrata, C krusei,C parapsilosis,    C. tropicalis and the KPC resistance gene.    "Due to technical problems, Proteus sp. will Not be reported as part of    the BCID panel until further notice"    .    TYPE: (C=Critical, N=Notification, A=Abnormal) C    TESTS:  _ GS    DATE/TIME CALLED: _ 09/18/2018 08:09:13    CALLED TO: Jomar Henry RN    READ BACK (2 Patient Identifiers)(Y/N): _ Y    READ BACK VALUES (Y/N): _ Y    CALLED BY: _ Brinda    .    TYPE: (C=Critical, N=Notification, A=Abnormal) C    TESTS:  _ BLD GS    DATE/TIME CALLED: _ 09/19/2018 08:58:18    CALLED TO: CLAU WONG RN    READ BACK (2 Patient Identifiers)(Y/N): _ Y    READ BACK VALUES (Y/N): _ Y    CALLED BY: _ ERIK    .    Copy to Infection Control, Logistics.  Organism: Urielpne MDRO  Blood Culture PCR (09-22-18 @ 12:20)  Organism: Blood Culture PCR (09-22-18 @ 12:20)    Sensitivities:      -  Klebsiella pneumoniae: Detec      -  Multidrug (KPC pos) resistant organism: Detec The KPC gene confers resistance to all penicillins, cephalosporins, carbapenems and aztreonam. Additional resistance to fluoroquinolones and aminoglycosides is likely.      Method Type: PCR  Organism: Maritzae MDRO (09-22-18 @ 12:20)    Sensitivities:      -  Polymyxin B: 0.5      Method Type: ETEST  Organism: Urielpne MDRO (09-22-18 @ 12:20)    Sensitivities:      -  Amikacin: R >32      -  Ampicillin: R >16 These ampicillin results predict results for amoxicillin      -  Ampicillin/Sulbactam: R >16/8      -  Aztreonam: R >16      -  Cefazolin: R >16      -  Cefepime: R >16      -  Cefoxitin: I 16      -  Ceftriaxone: R >32 Enterobacter, Citrobacter, and Serratia may develop resistance during prolonged therapy      -  Ciprofloxacin: R >2      -  Ertapenem: R >4      -  Gentamicin: S <=4      -  Imipenem: R >8      -  Levofloxacin: R >4      -  Meropenem: R >8      -  Piperacillin/Tazobactam: R >64      -  Tigecycline: S <=2      -  Tobramycin: R >8      -  Trimethoprim/Sulfamethoxazole: S <=2/38      Method Type: PRASHANTH

## 2018-09-25 NOTE — PROCEDURE NOTE - NSPOSTCAREGUIDE_GEN_A_CORE
Verbal/written post procedure instructions were given to patient/caregiver/Instructed patient/caregiver regarding signs and symptoms of infection/Instructed patient/caregiver to follow-up with primary care physician

## 2018-09-26 LAB
GLUCOSE BLDC GLUCOMTR-MCNC: 115 MG/DL — HIGH (ref 70–99)
GLUCOSE BLDC GLUCOMTR-MCNC: 123 MG/DL — HIGH (ref 70–99)
GLUCOSE BLDC GLUCOMTR-MCNC: 131 MG/DL — HIGH (ref 70–99)

## 2018-09-26 PROCEDURE — 71045 X-RAY EXAM CHEST 1 VIEW: CPT | Mod: 26

## 2018-09-26 PROCEDURE — 99232 SBSQ HOSP IP/OBS MODERATE 35: CPT

## 2018-09-26 RX ADMIN — Medication 250 MILLIGRAM(S): at 06:07

## 2018-09-26 RX ADMIN — Medication 325 MILLIGRAM(S): at 12:58

## 2018-09-26 RX ADMIN — ENOXAPARIN SODIUM 40 MILLIGRAM(S): 100 INJECTION SUBCUTANEOUS at 12:59

## 2018-09-26 RX ADMIN — ATORVASTATIN CALCIUM 80 MILLIGRAM(S): 80 TABLET, FILM COATED ORAL at 21:44

## 2018-09-26 RX ADMIN — PANTOPRAZOLE SODIUM 40 MILLIGRAM(S): 20 TABLET, DELAYED RELEASE ORAL at 06:07

## 2018-09-26 NOTE — PROGRESS NOTE ADULT - PROBLEM SELECTOR PROBLEM 4
Diabetes mellitus of other type with complication
Reflux gastritis

## 2018-09-26 NOTE — PROGRESS NOTE ADULT - PROBLEM SELECTOR PROBLEM 3
Pyelonephritis, acute
BOUCHRA (acute kidney injury)
Pyelonephritis, acute

## 2018-09-26 NOTE — PROGRESS NOTE ADULT - PROBLEM SELECTOR PROBLEM 1
Carbapenem-resistant Klebsiella pneumoniae infection
Sepsis due to Klebsiella pneumoniae
Sepsis due to Klebsiella pneumoniae
Carbapenem-resistant Klebsiella pneumoniae infection
Sepsis due to Klebsiella pneumoniae
Carbapenem-resistant Klebsiella pneumoniae infection

## 2018-09-26 NOTE — PROGRESS NOTE ADULT - SUBJECTIVE AND OBJECTIVE BOX
Brief Note  ========    awaiting discharge planning  will sign off    please call back with new concerns

## 2018-09-26 NOTE — PROGRESS NOTE ADULT - REASON FOR ADMISSION
Altered MS

## 2018-09-26 NOTE — PROGRESS NOTE ADULT - SUBJECTIVE AND OBJECTIVE BOX
CC: Altered MS, Carbapenem resistant bacteremia/UTI    HPI:  Mr. Vanegas is a 69 y/o M with h/o MR, HTN, DM Type 2, dyslipidemia, recent Left tibial plateau fracture. He was admitted with AMS and Sepsis with shock present at admission requiring pressors and admitted to MICU. His BOUCHRA is secondary to ATN present at admission. He was treated with broad spectrum antibiotics for sepsis, secondary to UTI.  He was found to have UTI and Bacteremia with carbapenem resistant klebsiella.  He's on ceftazidime. He will need PICC line and his family wants him home on IV antibiotics during discharge.     INTERVAL HPI/OVERNIGHT EVENTS: Patient seen and examined lying in bed with family at bedside and 1:1.  Patient unable to answer ROS secondary to MR.  No acute events overnight. Per case management, home infusion company unable to start until tomorrow afternoon.    Vital Signs Last 24 Hrs  T(C): 36.3 (26 Sep 2018 07:05), Max: 36.9 (26 Sep 2018 04:00)  T(F): 97.4 (26 Sep 2018 07:05), Max: 98.5 (26 Sep 2018 06:00)  HR: 69 (26 Sep 2018 07:05) (69 - 70)  BP: 159/75 (26 Sep 2018 07:05) (156/78 - 159/75)  BP(mean): --  RR: 20 (26 Sep 2018 07:05) (18 - 20)  SpO2: 97% (26 Sep 2018 07:05) (97% - 97%)  I&O's Detail    25 Sep 2018 07:01  -  26 Sep 2018 07:00  --------------------------------------------------------  IN:    Solution: 200 mL  Total IN: 200 mL    OUT:    Voided: 976 mL  Total OUT: 976 mL    Total NET: -776 mL      26 Sep 2018 07:01  -  26 Sep 2018 15:08  --------------------------------------------------------  IN:    Solution: 250 mL  Total IN: 250 mL    OUT:    Voided: 550 mL  Total OUT: 550 mL    Total NET: -300 mL      PHYSICAL EXAM:  GENERAL: NAD  HEAD:  Atraumatic, Normocephalic  NECK: Supple, No JVD, Normal thyroid  NERVOUS SYSTEM:  Alert, Good concentration; generalized weakness  CHEST/LUNG: Clear to auscultation bilaterally; No rales, rhonchi, wheezing, or rubs  HEART: Regular rate and rhythm; No murmurs, rubs, or gallops  ABDOMEN: Soft, Nontender, Nondistended; Bowel sounds present  EXTREMITIES:  2+ Peripheral Pulses, LLE with brace in place                                9.7    7.6   )-----------( 315      ( 25 Sep 2018 06:52 )             31.6     25 Sep 2018 06:52    139    |  97     |  12.0   ----------------------------<  117    3.8     |  28.0   |  0.90     Ca    9.6        25 Sep 2018 06:52  Phos  2.7       25 Sep 2018 06:52  Mg     2.0       25 Sep 2018 06:52        CAPILLARY BLOOD GLUCOSE  POCT Blood Glucose.: 123 mg/dL (26 Sep 2018 12:57)  POCT Blood Glucose.: 131 mg/dL (26 Sep 2018 08:32)  POCT Blood Glucose.: 134 mg/dL (25 Sep 2018 21:56)  POCT Blood Glucose.: 109 mg/dL (25 Sep 2018 17:16)          MEDICATIONS  (STANDING):  aspirin enteric coated 325 milliGRAM(s) Oral daily  atorvastatin 80 milliGRAM(s) Oral at bedtime  cefTAZidime/avibactam IVPB      cefTAZidime/avibactam IVPB 2.5 Gram(s) IV Intermittent every 8 hours  dextrose 5%. 1000 milliLiter(s) (50 mL/Hr) IV Continuous <Continuous>  dextrose 50% Injectable 12.5 Gram(s) IV Push once  dextrose 50% Injectable 25 Gram(s) IV Push once  dextrose 50% Injectable 25 Gram(s) IV Push once  enoxaparin Injectable 40 milliGRAM(s) SubCutaneous daily  insulin lispro (HumaLOG) corrective regimen sliding scale   SubCutaneous Before meals and at bedtime  pantoprazole    Tablet 40 milliGRAM(s) Oral before breakfast    MEDICATIONS  (PRN):  acetaminophen   Tablet .. 650 milliGRAM(s) Oral every 6 hours PRN Moderate Pain (4 - 6)  aluminum hydroxide/magnesium hydroxide/simethicone Suspension 30 milliLiter(s) Oral every 6 hours PRN Dyspepsia  dextrose 40% Gel 15 Gram(s) Oral once PRN Blood Glucose LESS THAN 70 milliGRAM(s)/deciliter  glucagon  Injectable 1 milliGRAM(s) IntraMuscular once PRN Glucose LESS THAN 70 milligrams/deciliter      RADIOLOGY & ADDITIONAL TESTS:  < from: Xray Chest 1 View- PORTABLE-Urgent (09.26.18 @ 12:29) >  PROCEDURE DATE:  09/26/2018          INTERPRETATION:      History: PICC line placement    Technique:  AP portable    Comparisons:  Chest x-ray dated 9/25/2018    Findings:     No acute infiltrates, congestion or pleural effusions  .   PICC line in the SVC.    The pulmonary vasculature and aorta are normal   for age. Heart size is unremarkable.     The thorax is normal for age.    Impression: No acute pulmonary disease. PICC line in the SVC. No change   from prior study                KIT MCKEON M.D., ATTENDING RADIOLOGIST  This document has been electronically signed. Sep 26 2018 12:56PM                < end of copied text >

## 2018-09-26 NOTE — PROGRESS NOTE ADULT - PROBLEM SELECTOR PLAN 4
-start protonix and maalox
-Continue protonix and maalox
-Continue protonix and maalox
-start protonix and maalox

## 2018-09-26 NOTE — PROGRESS NOTE ADULT - ASSESSMENT
67 y/o M with h/o MR, HTN, DM Type 2, dyslipidemia, recent Left tibial plateau fracture. Admitted with AMS and Sepsis with shock present at admission requiring pressors and admitted to MICU. BOUCHRA secondary to ATN present at admission. Treated with broad spectrum antibiotics for sepsis, secondary to UTI.  Found to have UTI and Bacteremia with carbapenem resistant klebsiella.    KPC Septicemia and pyelonephritis  KPC Pyelonephritis   Sepsis due to above    - Blood cultures with KPC  - Repeat blood cultures no growth 9/18/18 and 9/20/18  - Sensitivity to Polymxin back and is sensitive   - Sensitivity to Avycaz pending  - Continue Polymxin 1 million units IV q 12hours and Avycaz 2.5gm IV q 8hours until sensitivities are available   - CT A/P with cystitis   - Will need to monitor Cr function closely while on Polymxin   - So far Cr is stable  - Leukocytosis improved  - Afebrile  - Will monitor closely
 has:
67 y/o M with h/o MR, HTN, DM Type 2, dyslipidemia, recent Left tibial plateau fracture. Admitted with AMS and Sepsis with shock present at admission requiring pressors and admitted to MICU. BOUCHRA secondary to ATN present at admission. Treated with broad spectrum antibiotics for sepsis, secondary to UTI.  Found to have UTI and Bacteremia with carbapenem resistant klebsiella.    KPC Septicemia and pyelonephritis  [facility] Pyelonephritis   Sepsis due to above    - Blood cultures with [facility]PC  - Repeat blood cultures no growth since 9/18/18     - Sensitive to Polymxin  and Avycaz   - Continue Avycaz 2.5gm IV q 8hours  - end date is 10/1/18 for 14 days of effective therapy.   - CT A/P with cystitis
67 yo M w/ septic shock 2/2 MDR UTI/Bacteremia PMH  MRDD (unspecified type), DM2, HTN, HLD, Recent fall with Left Tibial plateau Fx    Septic Shock secondary to MDR UTI/Bacteremia: Clinically improved. ID consulted.  Abx changed to Polymyxin.  Repeat Blood cultures in am. Monitor for fevers. WBC 10.4.     BOUCHRA: Cr improved to 1.01 from 1.97 was likely due to ATN. Monitor Urine output and renal fxn.     Anemia: Likely Anemia of chronic disease. No signs of active bleeding. Hgb 9.4 and stable.     DM2: A1c 6.6% Continue SSI    HTN: BP stable and at goal. No longer hypotensive and off pressors    HLD: Continue statin
67 yo M w/ septic shock 2/2 MDR UTI/Bacteremia PMH  MRDD (unspecified type), DM2, HTN, HLD, Recent fall with Left Tibial plateau Fx    Septic Shock secondary to MDR UTI/Bacteremia: Clinically improved. ID following Abx changed to Polymyxin.  Repeat Blood cultures 9/20. Monitor for fevers. WBC 6.3     BOUCHRA: Cr improved ,  likely due to ATN. Monitor Urine output and renal fxn.     Anemia: Likely Anemia of chronic disease. No signs of active bleeding. Hgb  stable.     DM2: A1c 6.6% Continue SSI    HTN: BP stable and at goal. No longer hypotensive and off pressors    HLD: Continue statin    Left Tibial Plateau Fracture- Ortho notified of patient's return to hospital XRAYS repeated and reviewed, NWB LLE. PT ordered. DVT ppx Lovenox. follow up with Dr. Petty as out patient in 3 weeks.
67 yo M w/ septic shock 2/2 MDR UTI/Bacteremia PMH  MRDD (unspecified type), DM2, HTN, HLD, Recent fall with Left Tibial plateau Fx    Septic Shock secondary to MDR UTI/Bacteremia: Clinically improved. ID following Abx changed to Polymyxin.  Repeat Blood cultures 9/20. Monitor for fevers. WBC normalized. As per ID:- Will need to treat with Polymxin 1 million units IV q 12hours and Avycaz 2.5gm IV q 8hours until sensitivities are available, monitor renal function closely  CT A/P with cystitis      ABN. CT results:  Bones/joints: Heterogeneous marrow with lytic and sclerotic foci in the bony pelvis, similar to 8/29, clinical workup for metastasis/multiple   myeloma and follow-up MRI and/or bone scan are recommended. Bone scan ordered.     BOUCHRA: Cr improved ,  likely due to ATN. Monitor Urine output and renal fxn.     Anemia: Likely Anemia of chronic disease. No signs of active bleeding. Hgb  stable.     DM2: A1c 6.6% Continue SSI    HTN: BP stable and at goal. No longer hypotensive and off pressors    HLD: Continue statin    Left Tibial Plateau Fracture- Ortho notified of patient's return to hospital XRAYS repeated and reviewed, NWB LLE. PT ordered. DVT ppx Lovenox. Follow up with Dr. Petty as out patient in 3 weeks.
69 y/o M with h/o MR, HTN, DM Type 2, dyslipidemia, recent Left tibial plateau fracture. Admitted with AMS and Sepsis with shock present at admission requiring pressors and admitted to MICU. BOUCHRA secondary to ATN present at admission. Treated with broad spectrum antibiotics for sepsis, secondary to UTI.  Found to have UTI and Bacteremia with carbapenem resistant klebsiella.    KPC Septicemia and pyelonephritis  [facility] Pyelonephritis   Sepsis due to above    - Blood cultures with K[facility]  - Repeat blood cultures no growth 9/18/18  - Sensitivity to Polymxin and Avycaz pending  - Will need to treat with Polymxin 1 million units IV q 12hours and Avycaz 2.5gm IV q 8hours until sensitivities are available   - CT A/P with cystitis   - Will need to monitor Cr function closely while on Polymxin   - So far Cr is stable  - Leukocytosis improved  - Afebrile  - Will monitor closely
69 y/o M with h/o MR, HTN, DM Type 2, dyslipidemia, recent Left tibial plateau fracture. Admitted with AMS and Sepsis with shock present at admission requiring pressors and admitted to MICU. BOUCHRA secondary to ATN present at admission. Treated with broad spectrum antibiotics for sepsis, secondary to UTI.  Found to have UTI and Bacteremia with carbapenem resistant klebsiella.    KPC Septicemia and pyelonephritis  [facility]PC Pyelonephritis   Sepsis due to above    - Blood cultures with KPC  - Repeat blood cultures no growth 9/18/18 and 9/20/18  - Sensitivity to Polymxin back and is sensitive   - Sensitivity to Avycaz back and is sensitive  - Continue Avycaz 2.5gm IV q 8hours   - D/C Polymxin   - CT A/P with cystitis   - Will need to monitor Cr function closely while on Polymxin   - So far Cr is stable  - Leukocytosis improved  - Afebrile  - Will monitor closely
69 y/o M with h/o MR, HTN, DM Type 2, dyslipidemia, recent Left tibial plateau fracture. Admitted with AMS and Sepsis with shock present at admission requiring pressors and admitted to MICU. BOUCHRA secondary to ATN present at admission. Treated with broad spectrum antibiotics for sepsis, secondary to UTI.  Found to have UTI and Bacteremia with carbapenem resistant klebsiella.    KPC Septicemia and pyelonephritis  [facility] Pyelonephritis   Sepsis due to above    - Blood cultures with K[facility]  - Spoke to Microbiology lab to do sensitivity to Polymxin and Avycaz  - Will need to treat with Polymxin 1 million units IV q 12hours and Avycaz 2.5gm IV q 8hours until sensitivities are available   - CT A/P ordered  - Will need to monitor Cr function closely while on Polymxin   - So far Cr is stable  - Leukocytosis improved  - Afebrile  - Will monitor closely
67 y/o M with h/o MR, HTN, DM Type 2, dyslipidemia, recent Left tibial plateau fracture. Admitted with AMS and Sepsis with shock present at admission requiring pressors and admitted to MICU. BOUCHRA secondary to ATN present at admission. Treated with broad spectrum antibiotics for sepsis, secondary to UTI.  Found to have UTI and Bacteremia with carbapenem resistant klebsiella.    KPC Septicemia and pyelonephritis  [facility]PC Pyelonephritis   Sepsis due to above    - Blood cultures with K[facility]  - Repeat blood cultures no growth since 9/18/18     - Sensitive to Polymxin  and Avycaz   - Continue Avycaz 2.5gm IV q 8hours  - end date is 10/1/18 for 14 days of effective therapy.   - CT A/P with cystitis
67 yo M w/ septic shock 2/2 UTI PMH  MRDD (unspecified type), DM2, HTN, HLD, Recent fall with Left Tibial plateau Fx    Septic Shock: Clinically improved. Continue Cefepime. Monitor for fevers. WBC 10.4.     BOUCHRA: Cr improved to 1.01 from 1.97 was likely due to ATN. Monitor Urine output and renal fxn.     Anemia: Likely Anemia of chronic disease. No signs of active bleeding. Hgb 9.4 and stable.     DM2: A1c 6.6% Continue SSI    HTN: BP stable and at goal. No longer hypotensive and off pressors  HLD: Continue statin

## 2018-09-26 NOTE — PROGRESS NOTE ADULT - PROBLEM SELECTOR PLAN 1
-cont ceftazidime (day 4/7) and awaiting sensitivities
-cont ceftazidime (day 5/7)   -appreciate ID f/u
-cont ceftazidime (day 6/7)   -appreciate ID f/u  -bone scan today?
-cont ceftazidime until 10/1; PICC line ordered  -appreciate ID f/u  -bone scan with foci in the left lower anterior rib, right clavicle and coccyx are nonspecific, maybe post traumatic. Lytic and sclerotic foci in the pelvis seen on diagnostic CT are not well defined on the bone scan.
-cont ceftazidime until 10/1; PICC line placed  -appreciate ID f/u  -bone scan with foci in the left lower anterior rib, right clavicle and coccyx are nonspecific, maybe post traumatic. Lytic and sclerotic foci in the pelvis seen on diagnostic CT are not well defined on the bone scan.

## 2018-09-26 NOTE — PROGRESS NOTE ADULT - PROBLEM SELECTOR PROBLEM 2
Sepsis due to Klebsiella pneumoniae
Bacteremia
Bacteremia
Sepsis due to Klebsiella pneumoniae
Bacteremia
Sepsis due to Klebsiella pneumoniae

## 2018-09-26 NOTE — PROGRESS NOTE ADULT - PROVIDER SPECIALTY LIST ADULT
Critical Care
Hospitalist
Infectious Disease
Internal Medicine
Orthopedics
Infectious Disease
Infectious Disease

## 2018-09-27 VITALS
RESPIRATION RATE: 18 BRPM | TEMPERATURE: 99 F | OXYGEN SATURATION: 98 % | DIASTOLIC BLOOD PRESSURE: 67 MMHG | HEART RATE: 71 BPM | SYSTOLIC BLOOD PRESSURE: 157 MMHG

## 2018-09-27 LAB — GLUCOSE BLDC GLUCOMTR-MCNC: 128 MG/DL — HIGH (ref 70–99)

## 2018-09-28 ENCOUNTER — INPATIENT (INPATIENT)
Facility: HOSPITAL | Age: 68
LOS: 18 days | Discharge: NURSING, REHAB OR LONG-TERM CARE | DRG: 871 | End: 2018-10-17
Attending: HOSPITALIST | Admitting: HOSPITALIST
Payer: MEDICARE

## 2018-09-28 VITALS
WEIGHT: 229.06 LBS | DIASTOLIC BLOOD PRESSURE: 66 MMHG | SYSTOLIC BLOOD PRESSURE: 131 MMHG | HEART RATE: 158 BPM | RESPIRATION RATE: 30 BRPM | HEIGHT: 68 IN | OXYGEN SATURATION: 97 %

## 2018-09-28 PROBLEM — Z00.00 ENCOUNTER FOR PREVENTIVE HEALTH EXAMINATION: Status: ACTIVE | Noted: 2018-09-28

## 2018-09-28 LAB
ALBUMIN SERPL ELPH-MCNC: 3.5 G/DL — SIGNIFICANT CHANGE UP (ref 3.3–5.2)
ALP SERPL-CCNC: 96 U/L — SIGNIFICANT CHANGE UP (ref 40–120)
ALT FLD-CCNC: 30 U/L — SIGNIFICANT CHANGE UP
ANION GAP SERPL CALC-SCNC: 19 MMOL/L — HIGH (ref 5–17)
APPEARANCE UR: CLEAR — SIGNIFICANT CHANGE UP
AST SERPL-CCNC: 28 U/L — SIGNIFICANT CHANGE UP
BACTERIA # UR AUTO: ABNORMAL
BILIRUB SERPL-MCNC: 0.3 MG/DL — LOW (ref 0.4–2)
BILIRUB UR-MCNC: NEGATIVE — SIGNIFICANT CHANGE UP
BUN SERPL-MCNC: 26 MG/DL — HIGH (ref 8–20)
CALCIUM SERPL-MCNC: 9.8 MG/DL — SIGNIFICANT CHANGE UP (ref 8.6–10.2)
CHLORIDE SERPL-SCNC: 98 MMOL/L — SIGNIFICANT CHANGE UP (ref 98–107)
CO2 SERPL-SCNC: 22 MMOL/L — SIGNIFICANT CHANGE UP (ref 22–29)
COLOR SPEC: YELLOW — SIGNIFICANT CHANGE UP
COMMENT - URINE: SIGNIFICANT CHANGE UP
CREAT SERPL-MCNC: 1.39 MG/DL — HIGH (ref 0.5–1.3)
DIFF PNL FLD: ABNORMAL
EOSINOPHIL NFR BLD AUTO: 1 % — SIGNIFICANT CHANGE UP (ref 0–6)
EPI CELLS # UR: SIGNIFICANT CHANGE UP
GLUCOSE SERPL-MCNC: 161 MG/DL — HIGH (ref 70–115)
GLUCOSE UR QL: NEGATIVE MG/DL — SIGNIFICANT CHANGE UP
HCT VFR BLD CALC: 36.5 % — LOW (ref 42–52)
HGB BLD-MCNC: 11.7 G/DL — LOW (ref 14–18)
KETONES UR-MCNC: ABNORMAL
LACTATE BLDV-MCNC: 5.6 MMOL/L — CRITICAL HIGH (ref 0.5–2)
LEUKOCYTE ESTERASE UR-ACNC: ABNORMAL
LYMPHOCYTES # BLD AUTO: 6 % — LOW (ref 20–55)
MCHC RBC-ENTMCNC: 28.5 PG — SIGNIFICANT CHANGE UP (ref 27–31)
MCHC RBC-ENTMCNC: 32.1 G/DL — SIGNIFICANT CHANGE UP (ref 32–36)
MCV RBC AUTO: 89 FL — SIGNIFICANT CHANGE UP (ref 80–94)
MONOCYTES NFR BLD AUTO: 6 % — SIGNIFICANT CHANGE UP (ref 3–10)
NEUTROPHILS NFR BLD AUTO: 85 % — HIGH (ref 37–73)
NEUTS BAND # BLD: 2 % — SIGNIFICANT CHANGE UP (ref 0–8)
NITRITE UR-MCNC: NEGATIVE — SIGNIFICANT CHANGE UP
PH UR: 5 — SIGNIFICANT CHANGE UP (ref 5–8)
PLAT MORPH BLD: NORMAL — SIGNIFICANT CHANGE UP
PLATELET # BLD AUTO: 485 K/UL — HIGH (ref 150–400)
POTASSIUM SERPL-MCNC: 3.7 MMOL/L — SIGNIFICANT CHANGE UP (ref 3.5–5.3)
POTASSIUM SERPL-SCNC: 3.7 MMOL/L — SIGNIFICANT CHANGE UP (ref 3.5–5.3)
PROT SERPL-MCNC: 8.5 G/DL — SIGNIFICANT CHANGE UP (ref 6.6–8.7)
PROT UR-MCNC: 500 MG/DL
RBC # BLD: 4.1 M/UL — LOW (ref 4.6–6.2)
RBC # FLD: 16.9 % — HIGH (ref 11–15.6)
RBC BLD AUTO: NORMAL — SIGNIFICANT CHANGE UP
RBC CASTS # UR COMP ASSIST: SIGNIFICANT CHANGE UP /HPF (ref 0–4)
SODIUM SERPL-SCNC: 139 MMOL/L — SIGNIFICANT CHANGE UP (ref 135–145)
SP GR SPEC: 1.02 — SIGNIFICANT CHANGE UP (ref 1.01–1.02)
UROBILINOGEN FLD QL: NEGATIVE MG/DL — SIGNIFICANT CHANGE UP
WBC # BLD: 23.9 K/UL — HIGH (ref 4.8–10.8)
WBC # FLD AUTO: 23.9 K/UL — HIGH (ref 4.8–10.8)
WBC UR QL: SIGNIFICANT CHANGE UP

## 2018-09-28 PROCEDURE — 71045 X-RAY EXAM CHEST 1 VIEW: CPT | Mod: 26

## 2018-09-28 PROCEDURE — 70450 CT HEAD/BRAIN W/O DYE: CPT | Mod: 26

## 2018-09-28 PROCEDURE — 74177 CT ABD & PELVIS W/CONTRAST: CPT | Mod: 26

## 2018-09-28 PROCEDURE — 71260 CT THORAX DX C+: CPT | Mod: 26

## 2018-09-28 PROCEDURE — 99285 EMERGENCY DEPT VISIT HI MDM: CPT

## 2018-09-28 RX ORDER — SODIUM CHLORIDE 9 MG/ML
1000 INJECTION INTRAMUSCULAR; INTRAVENOUS; SUBCUTANEOUS ONCE
Qty: 0 | Refills: 0 | Status: COMPLETED | OUTPATIENT
Start: 2018-09-28 | End: 2018-09-28

## 2018-09-28 RX ORDER — VANCOMYCIN HCL 1 G
1000 VIAL (EA) INTRAVENOUS ONCE
Qty: 0 | Refills: 0 | Status: COMPLETED | OUTPATIENT
Start: 2018-09-28 | End: 2018-09-28

## 2018-09-28 RX ORDER — ACETAMINOPHEN 500 MG
650 TABLET ORAL ONCE
Qty: 0 | Refills: 0 | Status: COMPLETED | OUTPATIENT
Start: 2018-09-28 | End: 2018-09-28

## 2018-09-28 RX ORDER — SODIUM CHLORIDE 9 MG/ML
2000 INJECTION INTRAMUSCULAR; INTRAVENOUS; SUBCUTANEOUS ONCE
Qty: 0 | Refills: 0 | Status: COMPLETED | OUTPATIENT
Start: 2018-09-28 | End: 2018-09-28

## 2018-09-28 RX ADMIN — SODIUM CHLORIDE 1000 MILLILITER(S): 9 INJECTION INTRAMUSCULAR; INTRAVENOUS; SUBCUTANEOUS at 19:35

## 2018-09-28 RX ADMIN — SODIUM CHLORIDE 1000 MILLILITER(S): 9 INJECTION INTRAMUSCULAR; INTRAVENOUS; SUBCUTANEOUS at 22:40

## 2018-09-28 RX ADMIN — Medication 650 MILLIGRAM(S): at 19:34

## 2018-09-28 RX ADMIN — Medication 650 MILLIGRAM(S): at 20:04

## 2018-09-28 RX ADMIN — Medication 250 MILLIGRAM(S): at 22:40

## 2018-09-28 RX ADMIN — SODIUM CHLORIDE 1000 MILLILITER(S): 9 INJECTION INTRAMUSCULAR; INTRAVENOUS; SUBCUTANEOUS at 20:32

## 2018-09-28 RX ADMIN — SODIUM CHLORIDE 1000 MILLILITER(S): 9 INJECTION INTRAMUSCULAR; INTRAVENOUS; SUBCUTANEOUS at 20:23

## 2018-09-28 RX ADMIN — SODIUM CHLORIDE 1000 MILLILITER(S): 9 INJECTION INTRAMUSCULAR; INTRAVENOUS; SUBCUTANEOUS at 21:32

## 2018-09-28 RX ADMIN — Medication 1000 MILLIGRAM(S): at 23:40

## 2018-09-28 RX ADMIN — Medication 2 MILLIGRAM(S): at 19:40

## 2018-09-28 NOTE — ED ADULT NURSE NOTE - OBJECTIVE STATEMENT
Patient brought in for agitation, is A&Ox3, denies chest pain/shortness of breath, denies belly pain. Code Sepsis called for rectal temp and tachycardia.

## 2018-09-28 NOTE — ED PROVIDER NOTE - PROGRESS NOTE DETAILS
Please utilize this progress note as medical necessity note permitting patient to receive ct with iv contrast to rule out other occult infection . Discussed need to admit with patient & discussed risk and benefits.  Patient agreed to admission.  Discussed case w/ admitting medicine doctor - agreed to admit to their service.

## 2018-09-28 NOTE — ED PROVIDER NOTE - OBJECTIVE STATEMENT
Pt is a 69 yo M with h/o MR,  DM, gastritis, HLD, recent admission for sepsis 2/2 resistant klebsiella pneumonia UTI, receiving outpt ceftazidime-avibactam per PICC line, brought in by family for agitation.  Per niece, patient started to seem agitated yesterday and notes that it progressed throughout the day today. She feels it worsens after the antibiotics infusions. She denies any cough/ vomiting/diarrhea.  States that he has not been complaining of specific pain but has been making wining and whimpering noises. Patient noted to feel warm upon arrival; VS obtained and pt made code sepsis.

## 2018-09-28 NOTE — PHARMACOTHERAPY INTERVENTION NOTE - COMMENTS
Pt recently admitted for CRE klebsiella UTI/bacteremia (discharge one day ago), sensitive only to ceftazidime/avibactam and polymixin. Recommended giving ceftazidime/avibactam as stat abx during code sepsis due to likelihood of same infection.

## 2018-09-28 NOTE — ED ADULT NURSE NOTE - CHIEF COMPLAINT QUOTE
Patient brought in by ambulance Alert/disoriented, sent ED for acting out, patient was just discharged from [facility] yesterday for UTI, family believes either infection is back or having a reaction to ABX.

## 2018-09-28 NOTE — ED PROVIDER NOTE - MEDICAL DECISION MAKING DETAILS
pt brought in by family for agitation; found tachycardic, febrile despite ongoing antibiotics; will redose abx, broaden coverage, check RVP/ CXR/ UA, Cth and will consider CT chest/ abd pelvis to look for occult infection

## 2018-09-28 NOTE — ED ADULT NURSE NOTE - NSIMPLEMENTINTERV_GEN_ALL_ED
Implemented All Universal Safety Interventions:  Ethan to call system. Call bell, personal items and telephone within reach. Instruct patient to call for assistance. Room bathroom lighting operational. Non-slip footwear when patient is off stretcher. Physically safe environment: no spills, clutter or unnecessary equipment. Stretcher in lowest position, wheels locked, appropriate side rails in place.

## 2018-09-29 DIAGNOSIS — A41.9 SEPSIS, UNSPECIFIED ORGANISM: ICD-10-CM

## 2018-09-29 DIAGNOSIS — E78.5 HYPERLIPIDEMIA, UNSPECIFIED: ICD-10-CM

## 2018-09-29 DIAGNOSIS — N17.9 ACUTE KIDNEY FAILURE, UNSPECIFIED: ICD-10-CM

## 2018-09-29 DIAGNOSIS — E11.65 TYPE 2 DIABETES MELLITUS WITH HYPERGLYCEMIA: ICD-10-CM

## 2018-09-29 LAB
ANION GAP SERPL CALC-SCNC: 13 MMOL/L — SIGNIFICANT CHANGE UP (ref 5–17)
BASOPHILS # BLD AUTO: 0 K/UL — SIGNIFICANT CHANGE UP (ref 0–0.2)
BASOPHILS NFR BLD AUTO: 0.3 % — SIGNIFICANT CHANGE UP (ref 0–2)
BUN SERPL-MCNC: 18 MG/DL — SIGNIFICANT CHANGE UP (ref 8–20)
CALCIUM SERPL-MCNC: 8.6 MG/DL — SIGNIFICANT CHANGE UP (ref 8.6–10.2)
CHLORIDE SERPL-SCNC: 106 MMOL/L — SIGNIFICANT CHANGE UP (ref 98–107)
CO2 SERPL-SCNC: 23 MMOL/L — SIGNIFICANT CHANGE UP (ref 22–29)
CREAT SERPL-MCNC: 0.89 MG/DL — SIGNIFICANT CHANGE UP (ref 0.5–1.3)
CULTURE RESULTS: NO GROWTH — SIGNIFICANT CHANGE UP
EOSINOPHIL # BLD AUTO: 0.1 K/UL — SIGNIFICANT CHANGE UP (ref 0–0.5)
EOSINOPHIL NFR BLD AUTO: 1 % — SIGNIFICANT CHANGE UP (ref 0–5)
GLUCOSE BLDC GLUCOMTR-MCNC: 101 MG/DL — HIGH (ref 70–99)
GLUCOSE BLDC GLUCOMTR-MCNC: 102 MG/DL — HIGH (ref 70–99)
GLUCOSE BLDC GLUCOMTR-MCNC: 118 MG/DL — HIGH (ref 70–99)
GLUCOSE BLDC GLUCOMTR-MCNC: 95 MG/DL — SIGNIFICANT CHANGE UP (ref 70–99)
GLUCOSE SERPL-MCNC: 99 MG/DL — SIGNIFICANT CHANGE UP (ref 70–115)
HCT VFR BLD CALC: 30.1 % — LOW (ref 42–52)
HGB BLD-MCNC: 9 G/DL — LOW (ref 14–18)
LACTATE BLDV-MCNC: 0.7 MMOL/L — SIGNIFICANT CHANGE UP (ref 0.5–2)
LYMPHOCYTES # BLD AUTO: 1.3 K/UL — SIGNIFICANT CHANGE UP (ref 1–4.8)
LYMPHOCYTES # BLD AUTO: 13.4 % — LOW (ref 20–55)
MCHC RBC-ENTMCNC: 27.5 PG — SIGNIFICANT CHANGE UP (ref 27–31)
MCHC RBC-ENTMCNC: 29.9 G/DL — LOW (ref 32–36)
MCV RBC AUTO: 92 FL — SIGNIFICANT CHANGE UP (ref 80–94)
MONOCYTES # BLD AUTO: 0.8 K/UL — SIGNIFICANT CHANGE UP (ref 0–0.8)
MONOCYTES NFR BLD AUTO: 8.3 % — SIGNIFICANT CHANGE UP (ref 3–10)
NEUTROPHILS # BLD AUTO: 7.2 K/UL — SIGNIFICANT CHANGE UP (ref 1.8–8)
NEUTROPHILS NFR BLD AUTO: 76.7 % — HIGH (ref 37–73)
PLATELET # BLD AUTO: 378 K/UL — SIGNIFICANT CHANGE UP (ref 150–400)
POTASSIUM SERPL-MCNC: 3.4 MMOL/L — LOW (ref 3.5–5.3)
POTASSIUM SERPL-SCNC: 3.4 MMOL/L — LOW (ref 3.5–5.3)
RAPID RVP RESULT: SIGNIFICANT CHANGE UP
RBC # BLD: 3.27 M/UL — LOW (ref 4.6–6.2)
RBC # FLD: 17.3 % — HIGH (ref 11–15.6)
SODIUM SERPL-SCNC: 142 MMOL/L — SIGNIFICANT CHANGE UP (ref 135–145)
SPECIMEN SOURCE: SIGNIFICANT CHANGE UP
WBC # BLD: 9.4 K/UL — SIGNIFICANT CHANGE UP (ref 4.8–10.8)
WBC # FLD AUTO: 9.4 K/UL — SIGNIFICANT CHANGE UP (ref 4.8–10.8)

## 2018-09-29 PROCEDURE — 93970 EXTREMITY STUDY: CPT | Mod: 26

## 2018-09-29 PROCEDURE — 99223 1ST HOSP IP/OBS HIGH 75: CPT

## 2018-09-29 PROCEDURE — 12345: CPT | Mod: NC

## 2018-09-29 RX ORDER — DEXTROSE 50 % IN WATER 50 %
25 SYRINGE (ML) INTRAVENOUS ONCE
Qty: 0 | Refills: 0 | Status: DISCONTINUED | OUTPATIENT
Start: 2018-09-29 | End: 2018-10-17

## 2018-09-29 RX ORDER — SIMETHICONE 80 MG/1
80 TABLET, CHEWABLE ORAL
Qty: 0 | Refills: 0 | Status: DISCONTINUED | OUTPATIENT
Start: 2018-09-29 | End: 2018-10-03

## 2018-09-29 RX ORDER — ASPIRIN/CALCIUM CARB/MAGNESIUM 324 MG
325 TABLET ORAL DAILY
Qty: 0 | Refills: 0 | Status: DISCONTINUED | OUTPATIENT
Start: 2018-09-29 | End: 2018-10-05

## 2018-09-29 RX ORDER — INSULIN LISPRO 100/ML
VIAL (ML) SUBCUTANEOUS AT BEDTIME
Qty: 0 | Refills: 0 | Status: DISCONTINUED | OUTPATIENT
Start: 2018-09-29 | End: 2018-10-17

## 2018-09-29 RX ORDER — POTASSIUM CHLORIDE 20 MEQ
20 PACKET (EA) ORAL
Qty: 0 | Refills: 0 | Status: COMPLETED | OUTPATIENT
Start: 2018-09-29 | End: 2018-09-29

## 2018-09-29 RX ORDER — DEXTROSE 50 % IN WATER 50 %
15 SYRINGE (ML) INTRAVENOUS ONCE
Qty: 0 | Refills: 0 | Status: DISCONTINUED | OUTPATIENT
Start: 2018-09-29 | End: 2018-10-17

## 2018-09-29 RX ORDER — HEPARIN SODIUM 5000 [USP'U]/ML
5000 INJECTION INTRAVENOUS; SUBCUTANEOUS EVERY 12 HOURS
Qty: 0 | Refills: 0 | Status: DISCONTINUED | OUTPATIENT
Start: 2018-09-29 | End: 2018-10-07

## 2018-09-29 RX ORDER — PANTOPRAZOLE SODIUM 20 MG/1
40 TABLET, DELAYED RELEASE ORAL
Qty: 0 | Refills: 0 | Status: DISCONTINUED | OUTPATIENT
Start: 2018-09-29 | End: 2018-10-17

## 2018-09-29 RX ORDER — GLUCAGON INJECTION, SOLUTION 0.5 MG/.1ML
1 INJECTION, SOLUTION SUBCUTANEOUS ONCE
Qty: 0 | Refills: 0 | Status: DISCONTINUED | OUTPATIENT
Start: 2018-09-29 | End: 2018-10-17

## 2018-09-29 RX ORDER — ATORVASTATIN CALCIUM 80 MG/1
20 TABLET, FILM COATED ORAL AT BEDTIME
Qty: 0 | Refills: 0 | Status: DISCONTINUED | OUTPATIENT
Start: 2018-09-29 | End: 2018-10-17

## 2018-09-29 RX ORDER — SODIUM CHLORIDE 9 MG/ML
1000 INJECTION INTRAMUSCULAR; INTRAVENOUS; SUBCUTANEOUS
Qty: 0 | Refills: 0 | Status: COMPLETED | OUTPATIENT
Start: 2018-09-29 | End: 2018-09-29

## 2018-09-29 RX ORDER — DEXTROSE 50 % IN WATER 50 %
12.5 SYRINGE (ML) INTRAVENOUS ONCE
Qty: 0 | Refills: 0 | Status: DISCONTINUED | OUTPATIENT
Start: 2018-09-29 | End: 2018-10-17

## 2018-09-29 RX ORDER — SODIUM CHLORIDE 9 MG/ML
1000 INJECTION, SOLUTION INTRAVENOUS
Qty: 0 | Refills: 0 | Status: DISCONTINUED | OUTPATIENT
Start: 2018-09-29 | End: 2018-10-17

## 2018-09-29 RX ORDER — FOLIC ACID 0.8 MG
1 TABLET ORAL DAILY
Qty: 0 | Refills: 0 | Status: DISCONTINUED | OUTPATIENT
Start: 2018-09-29 | End: 2018-10-17

## 2018-09-29 RX ORDER — ACETAMINOPHEN 500 MG
650 TABLET ORAL EVERY 6 HOURS
Qty: 0 | Refills: 0 | Status: DISCONTINUED | OUTPATIENT
Start: 2018-09-29 | End: 2018-10-17

## 2018-09-29 RX ORDER — INSULIN LISPRO 100/ML
VIAL (ML) SUBCUTANEOUS
Qty: 0 | Refills: 0 | Status: DISCONTINUED | OUTPATIENT
Start: 2018-09-29 | End: 2018-10-17

## 2018-09-29 RX ADMIN — Medication 325 MILLIGRAM(S): at 11:57

## 2018-09-29 RX ADMIN — Medication 1 MILLIGRAM(S): at 11:57

## 2018-09-29 RX ADMIN — HEPARIN SODIUM 5000 UNIT(S): 5000 INJECTION INTRAVENOUS; SUBCUTANEOUS at 16:50

## 2018-09-29 RX ADMIN — ATORVASTATIN CALCIUM 20 MILLIGRAM(S): 80 TABLET, FILM COATED ORAL at 21:46

## 2018-09-29 RX ADMIN — SIMETHICONE 80 MILLIGRAM(S): 80 TABLET, CHEWABLE ORAL at 05:46

## 2018-09-29 RX ADMIN — PANTOPRAZOLE SODIUM 40 MILLIGRAM(S): 20 TABLET, DELAYED RELEASE ORAL at 05:46

## 2018-09-29 RX ADMIN — SODIUM CHLORIDE 100 MILLILITER(S): 9 INJECTION INTRAMUSCULAR; INTRAVENOUS; SUBCUTANEOUS at 03:49

## 2018-09-29 RX ADMIN — HEPARIN SODIUM 5000 UNIT(S): 5000 INJECTION INTRAVENOUS; SUBCUTANEOUS at 05:46

## 2018-09-29 RX ADMIN — SIMETHICONE 80 MILLIGRAM(S): 80 TABLET, CHEWABLE ORAL at 21:48

## 2018-09-29 NOTE — H&P ADULT - NSHPPHYSICALEXAM_GEN_ALL_CORE
General: Well developed male in bed not in distress.   HEENT: AT, NC. PERRL. somewhat sedated but arouse able ( got iv ativan in ER ) no throat erythema or exudate.   Neck: supple. no JVD.   Chest: CTA bilaterally  Heart: normal S1,S2. RRR. no heart murmur appreciated.   Abdomen: soft. obese.  + BS.  pt. not in pain on abdominal palpation.   Ext: no C/C/E. pt's left lower ext is in brace.   Vascular : 2 + DP b/L.   Neuro: Pt. is somewhat sedated but arouse able, moves his extremities, non focal. pt. not co-operative with exam.  Skin: no rash noted , somewhat warm, skin under brace is without any skin break or rash. pt's RUE picc line is intact, no surrounding erythema noted.   Psychiatric : pt. not agitated at the time of admission, resting comfortably.

## 2018-09-29 NOTE — H&P ADULT - NSHPSOCIALHISTORY_GEN_ALL_CORE
unknown smoking and etoh use history as pt. does not give much history and family not present at the time of admission.

## 2018-09-29 NOTE — H&P ADULT - HISTORY OF PRESENT ILLNESS
67 y/o male with MR who was discharged on 9/27/18 from [facility] after being diagnosed with Klebsiella pneumoniae sepsis and was sent home with PICC line and AVYCAZ till 10/1/18 was brought back to ER by family as he was somewhat more agitated than his baseline. When pt. arrived to ER he had fever of 101.6, code sepsis was called, pt's wbc on this vist were 23.9 and were within normal range prior to discharge on 9/27/18. As per history there is no cough or  n/v/d. pt. does not contribute to history. no other complaints at the time of admission. pt. got ativan in the ER and is resting comfortably. 69 y/o male with MR who was discharged on 9/27/18 from [facility] after being diagnosed with Klebsiella pneumoniae sepsis and was sent home with PICC line and AVYCAZ till 10/1/18 was brought back to ER by family as he was somewhat more agitated than his baseline. When pt. arrived to ER he had fever of 101.6, code sepsis was called, pt's wbc on this vist were 23.9 and were within normal range prior to discharge on 9/27/18. As per history there is no cough or  n/v/d. pt. does not contribute to history. no other complaints at the time of admission. pt. got ativan in the ER and is resting comfortably.  As per record pt. was seen by ortho service on august 25th for left tibial plateau fracture and brace and NWB LLE was recommended and out- pt. ortho follow up. 69 y/o male with DM, hyperlipidemia, anemia and  MR as per history was discharged on 9/27/18 from [facility] after being diagnosed with Klebsiella pneumoniae sepsis and was sent home with PICC line and AVYCAZ till 10/1/18 was brought back to ER by family as he was somewhat more agitated than his baseline. When pt. arrived to ER he had fever of 101.6, code sepsis was called, pt's wbc on this vist were 23.9 and were within normal range prior to discharge on 9/27/18. As per history there is no cough or  n/v/d. pt. does not contribute to history. pt's ct chest abdomen and pelvis was done in ER and did not show any focus of infection. ct head did not show acute findings as well. no other complaints at the time of admission. pt. got ativan in the ER and is resting comfortably.  As per record pt. was seen by ortho service on august 25th for left tibial plateau fracture and brace and NWB LLE was recommended and out- pt. ortho follow up.

## 2018-09-29 NOTE — PROGRESS NOTE ADULT - SUBJECTIVE AND OBJECTIVE BOX
YAMIL EMMIE  ----------------------------------------  The patient was seen and evaluated for sepsis.  The patient is in no acute distress.  Offers no complaints.    Vital Signs Last 24 Hrs  T(C): 36.6 (29 Sep 2018 08:25), Max: 38.7 (28 Sep 2018 19:27)  T(F): 97.8 (29 Sep 2018 08:25), Max: 101.6 (28 Sep 2018 19:27)  HR: 72 (29 Sep 2018 08:25) (72 - 158)  BP: 163/65 (29 Sep 2018 08:25) (118/55 - 163/65)  BP(mean): --  RR: 18 (29 Sep 2018 08:25) (18 - 30)  SpO2: 97% (29 Sep 2018 08:25) (92% - 99%)    CAPILLARY BLOOD GLUCOSE  POCT Blood Glucose.: 95 mg/dL (29 Sep 2018 12:07)  POCT Blood Glucose.: 101 mg/dL (29 Sep 2018 08:02)    PHYSICAL EXAMINATION:  ----------------------------------------  General appearance: No acute distress, Awake, Alert  HEENT: Normocephalic, Atraumatic, Conjunctiva clear, EOMI  Neck: Supple, No JVD, No tenderness  Lungs: Clear to auscultation, Breath sound equal bilaterally, No wheezes, No rales  Cardiovascular: S1S2, Regular rhythm  Abdomen: Soft, Nontender, Nondistended, No guarding/rebound, Positive bowel sounds  Extremities: No clubbing, No cyanosis, No edema, No calf tenderness, Left leg brace  Neuro: Strength equal bilaterally, No tremors  Psychiatric: Appropriate mood, Normal affect    LABORATORY STUDIES:  ----------------------------------------             9.0    9.4   )-----------( 378      ( 29 Sep 2018 08:16 )             30.1     09-29    142  |  106  |  18.0  ----------------------------<  99  3.4<L>   |  23.0  |  0.89    Ca    8.6      29 Sep 2018 08:16    TPro  8.5  /  Alb  3.5  /  TBili  0.3<L>  /  DBili  x   /  AST  28  /  ALT  30  /  AlkPhos  96  09-28    LIVER FUNCTIONS - ( 28 Sep 2018 19:37 )  Alb: 3.5 g/dL / Pro: 8.5 g/dL / ALK PHOS: 96 U/L / ALT: 30 U/L / AST: 28 U/L / GGT: x           Urinalysis Basic - ( 28 Sep 2018 21:42 )  Color: Yellow / Appearance: Clear / S.020 / pH: x  Gluc: x / Ketone: Trace  / Bili: Negative / Urobili: Negative mg/dL   Blood: x / Protein: 500 mg/dL / Nitrite: Negative   Leuk Esterase: Trace / RBC: 0-2 /HPF / WBC 3-5   Sq Epi: x / Non Sq Epi: Occasional / Bacteria: Occasional    MEDICATIONS  (STANDING):  aspirin enteric coated 325 milliGRAM(s) Oral daily  atorvastatin 20 milliGRAM(s) Oral at bedtime  cefTAZidime/avibactam IVPB      cefTAZidime/avibactam IVPB 2.5 Gram(s) IV Intermittent every 8 hours  dextrose 5%. 1000 milliLiter(s) (50 mL/Hr) IV Continuous <Continuous>  dextrose 50% Injectable 12.5 Gram(s) IV Push once  dextrose 50% Injectable 25 Gram(s) IV Push once  dextrose 50% Injectable 25 Gram(s) IV Push once  folic acid 1 milliGRAM(s) Oral daily  heparin  Injectable 5000 Unit(s) SubCutaneous every 12 hours  insulin lispro (HumaLOG) corrective regimen sliding scale   SubCutaneous three times a day before meals  insulin lispro (HumaLOG) corrective regimen sliding scale   SubCutaneous at bedtime  pantoprazole    Tablet 40 milliGRAM(s) Oral before breakfast  potassium chloride    Tablet ER 20 milliEquivalent(s) Oral every 2 hours  simethicone 80 milliGRAM(s) Chew four times a day    MEDICATIONS  (PRN):  acetaminophen   Tablet .. 650 milliGRAM(s) Oral every 6 hours PRN Temp greater or equal to 38C (100.4F)  dextrose 40% Gel 15 Gram(s) Oral once PRN Blood Glucose LESS THAN 70 milliGRAM(s)/deciliter  glucagon  Injectable 1 milliGRAM(s) IntraMuscular once PRN Glucose LESS THAN 70 milligrams/deciliter      ASSESSMENT / PLAN:  ----------------------------------------  Sepsis - On ceftazidime/avibactam. Infectious Disease consultation noted. Afebrile with resolved leukocytosis.    Diabetes - Insulin coverage, close monitoring of blood glucose levels.    Acute kidney injury - Improved with intravenous fluids.

## 2018-09-29 NOTE — CONSULT NOTE ADULT - SUBJECTIVE AND OBJECTIVE BOX
[facility]  INFECTIOUS DISEASES AND INTERNAL MEDICINE at [place]  =======================================================  José Luis Walton MD  Diplomates American Board of Internal Medicine and Infectious Diseases  =======================================================    MRN-[id number]  YAMILDENZEL OLEA     CC:  sepsis     HPI:  67 y/o man with DM, hyperlipidemia, anemia and MR as per history was discharged on 18 from [facility] after being diagnosed with Klebsiella pneumoniae sepsis and was sent home with PICC line and Avycaz till 10/1/18, admitted again last night with fever and high WBC. He has no chest pain, SOB, cough or n/v/d. pt.   He has a neg Abdominal and chest CT for infectious etiologies. He is a very poor historian but he is known to ID.  Patient was seen by ortho service on  for left tibial plateau fracture and brace and NWB LLE was recommended with outpt ortho follow up.     PAST MEDICAL & SURGICAL HISTORY:  Anemia, unspecified type  Hyperlipidemia, unspecified hyperlipidemia type  Diabetes mellitus of other type with complication  Hypertension, unspecified type  No significant past surgical history    FAMILY HISTORY:  No pertinent family history in first degree relatives    Allergies  No Known Allergies    Antibiotics:  cefTAZidime/avibactam IVPB        REVIEW OF SYSTEMS:  CONSTITUTIONAL:  No Fever or chills  HEENT:  No diplopia or blurred vision.  No sore throat or runny nose.  CARDIOVASCULAR:  No chest pain or SOB.  RESPIRATORY:  No cough, shortness of breath, PND or orthopnea.  GASTROINTESTINAL:  No nausea, vomiting or diarrhea.  GENITOURINARY:  No dysuria, frequency or urgency. No Blood in urine  MUSCULOSKELETAL:  no joint aches, no muscle pain  SKIN:  No change in skin, hair or nails.  NEUROLOGIC:  No paresthesias, fasciculations, seizures or weakness.  PSYCHIATRIC:  No disorder of thought or mood.  ENDOCRINE:  No heat or cold intolerance, polyuria or polydipsia.  HEMATOLOGICAL:  No easy bruising or bleeding.     Physical Exam:  Vital Signs Last 24 Hrs  T(C): 36.6 (29 Sep 2018 08:25), Max: 38.7 (28 Sep 2018 19:27)  T(F): 97.8 (29 Sep 2018 08:25), Max: 101.6 (28 Sep 2018 19:27)  HR: 72 (29 Sep 2018 08:25) (72 - 158)  BP: 163/65 (29 Sep 2018 08:25) (118/55 - 163/65)  RR: 18 (29 Sep 2018 08:25) (18 - 30)  SpO2: 97% (29 Sep 2018 08:25) (92% - 99%)  Height (cm): 172.72 ( @ 19:05)  Weight (kg): 103.9 ( @ 19:05)  BMI (kg/m2): 34.8 ( @ 19:05)  BSA (m2): 2.17 ( @ 19:05)  GEN: NAD, obese  HEENT: normocephalic and atraumatic. EOMI. PERRL.    NECK: Supple.  No lymphadenopathy   LUNGS: Clear to auscultation.  HEART: Regular rate and rhythm without murmur.  ABDOMEN: Soft, nontender, and nondistended.  Positive bowel sounds.    : No CVA tenderness  EXTREMITIES: left leg in brace, unable to check the skin, has 1+ edema   NEUROLOGIC: grossly intact.  PSYCHIATRIC: Appropriate affect .  SKIN: No ulceration or induration present.    Labs:      142  |  106  |  18.0  ----------------------------<  99  3.4<L>   |  23.0  |  0.89    Ca    8.6      29 Sep 2018 08:16    TPro  8.5  /  Alb  3.5  /  TBili  0.3<L>  /  DBili  x   /  AST  28  /  ALT  30  /  AlkPhos  96                          9.0    9.4   )-----------( 378      ( 29 Sep 2018 08:16 )             30.1     Urinalysis Basic - ( 28 Sep 2018 21:42 )    Color: Yellow / Appearance: Clear / S.020 / pH: x  Gluc: x / Ketone: Trace  / Bili: Negative / Urobili: Negative mg/dL   Blood: x / Protein: 500 mg/dL / Nitrite: Negative   Leuk Esterase: Trace / RBC: 0-2 /HPF / WBC 3-5   Sq Epi: x / Non Sq Epi: Occasional / Bacteria: Occasional    LIVER FUNCTIONS - ( 28 Sep 2018 19:37 )  Alb: 3.5 g/dL / Pro: 8.5 g/dL / ALK PHOS: 96 U/L / ALT: 30 U/L / AST: 28 U/L / GGT: x           RECENT CULTURES:   @ 22:55        NotDetec   @ 18:15 .Other        @ 08:00 .Blood Blood     No growth at 5 days.     @ 07:59 .Blood Blood     No growth at 5 days.     @ 17:26 .Blood Blood-Peripheral     No growth at 5 days.     @ 03:04 .Urine Catheterized Klepne MDRO    50,000 - 99,000 CFU/mL Klebsiella pneumoniae (Carbapenem Resistant)    READ BACK (2 Patient Identifiers)(Y/N): _ y  READ BACK VALUES (Y/N): _ y  CALLED BY: _ Zoila wang copy to ic pt log     @ 01:55 .Blood Blood     No growth at 5 days.   @ 01:53 .Blood Blood Klepne MDRO  Blood Culture PCR    Growth in aerobic bottle: Klebsiella pneumoniae (Carbapenem Resistant)  Aerobic Bottle: 22.04 Hours to positivity  Anaerobic Bottle: No growth at 5 days.      All imaging and other data have been reviewed.    IMPRESSION:  CT Chest: No lobar consolidation or acute findings.    CT Abdomen and Pelvis: No acute findings. Morphology of the vertebral   bodies and sacroiliac joints, suspicious for ankylosing spondylitis.   Intervally unchanged nonspecific marrow heterogeneity and lytic and   sclerotic pelvic lesions. Consider rheumatologic and   hematologic/oncologic workup to further evaluate these findings.

## 2018-09-29 NOTE — H&P ADULT - PROBLEM SELECTOR PLAN 1
pt. spike fever while on AVYCAZ, source unknown at this point, will get TTE first to r/o any source, if negative and still spiking fever may need TALON, will get lower ext. doppler r/o dvt as well.  lactate was 5.6 upon arrival and repeat is  0.7. will continue AVYCAZ for now and get ID consult. pt. spike fever while on AVYCAZ, source unknown at this point, will get TTE first to r/o any source, if negative and still spiking fever may need TALON, will get lower ext. doppler r/o dvt as well.  lactate was 5.6 upon arrival and repeat is  0.7. pt. hemodynamically stable. will continue AVYCAZ for now and get ID consult.

## 2018-09-29 NOTE — CONSULT NOTE ADULT - ASSESSMENT
67 y/o man with DM, hyperlipidemia, anemia and MR as per history was discharged on 9/27/18 from [facility] after being diagnosed with Klebsiella pneumoniae sepsis and was sent home with PICC line and Avycaz till 10/1/18, admitted again last night with fever and high WBC.   Source is unclear at this point but no major source in CT of chest or abdomen seen. Unclear if he was receiving avycaz regularly in rehab.     1-Fever:  -Blood [id number]  -UA and urine culture  -Orhto to check left leg   -Restart Avycaz   -Hematology for lytic lesions seen in pelvic bone to rule out any malignancy    2-Leukocytosis:  -came back to normal.   -will trend it    3-BOUCHRA:  -Improved.

## 2018-09-30 LAB
ANION GAP SERPL CALC-SCNC: 12 MMOL/L — SIGNIFICANT CHANGE UP (ref 5–17)
BUN SERPL-MCNC: 11 MG/DL — SIGNIFICANT CHANGE UP (ref 8–20)
CALCIUM SERPL-MCNC: 8.6 MG/DL — SIGNIFICANT CHANGE UP (ref 8.6–10.2)
CHLORIDE SERPL-SCNC: 105 MMOL/L — SIGNIFICANT CHANGE UP (ref 98–107)
CO2 SERPL-SCNC: 24 MMOL/L — SIGNIFICANT CHANGE UP (ref 22–29)
CREAT SERPL-MCNC: 0.84 MG/DL — SIGNIFICANT CHANGE UP (ref 0.5–1.3)
GLUCOSE BLDC GLUCOMTR-MCNC: 87 MG/DL — SIGNIFICANT CHANGE UP (ref 70–99)
GLUCOSE BLDC GLUCOMTR-MCNC: 88 MG/DL — SIGNIFICANT CHANGE UP (ref 70–99)
GLUCOSE BLDC GLUCOMTR-MCNC: 92 MG/DL — SIGNIFICANT CHANGE UP (ref 70–99)
GLUCOSE BLDC GLUCOMTR-MCNC: 98 MG/DL — SIGNIFICANT CHANGE UP (ref 70–99)
GLUCOSE SERPL-MCNC: 92 MG/DL — SIGNIFICANT CHANGE UP (ref 70–115)
HCT VFR BLD CALC: 29.2 % — LOW (ref 42–52)
HGB BLD-MCNC: 9.2 G/DL — LOW (ref 14–18)
LACTATE SERPL-SCNC: 1.1 MMOL/L — SIGNIFICANT CHANGE UP (ref 0.5–2)
MCHC RBC-ENTMCNC: 28 PG — SIGNIFICANT CHANGE UP (ref 27–31)
MCHC RBC-ENTMCNC: 31.5 G/DL — LOW (ref 32–36)
MCV RBC AUTO: 89 FL — SIGNIFICANT CHANGE UP (ref 80–94)
PLATELET # BLD AUTO: 399 K/UL — SIGNIFICANT CHANGE UP (ref 150–400)
POTASSIUM SERPL-MCNC: 3.7 MMOL/L — SIGNIFICANT CHANGE UP (ref 3.5–5.3)
POTASSIUM SERPL-SCNC: 3.7 MMOL/L — SIGNIFICANT CHANGE UP (ref 3.5–5.3)
RBC # BLD: 3.28 M/UL — LOW (ref 4.6–6.2)
RBC # FLD: 17 % — HIGH (ref 11–15.6)
SODIUM SERPL-SCNC: 141 MMOL/L — SIGNIFICANT CHANGE UP (ref 135–145)
WBC # BLD: 11.8 K/UL — HIGH (ref 4.8–10.8)
WBC # FLD AUTO: 11.8 K/UL — HIGH (ref 4.8–10.8)

## 2018-09-30 PROCEDURE — 99232 SBSQ HOSP IP/OBS MODERATE 35: CPT

## 2018-09-30 PROCEDURE — 99233 SBSQ HOSP IP/OBS HIGH 50: CPT

## 2018-09-30 RX ORDER — HALOPERIDOL DECANOATE 100 MG/ML
2 INJECTION INTRAMUSCULAR EVERY 6 HOURS
Qty: 0 | Refills: 0 | Status: DISCONTINUED | OUTPATIENT
Start: 2018-09-30 | End: 2018-10-17

## 2018-09-30 RX ORDER — HALOPERIDOL DECANOATE 100 MG/ML
2 INJECTION INTRAMUSCULAR ONCE
Qty: 0 | Refills: 0 | Status: COMPLETED | OUTPATIENT
Start: 2018-09-30 | End: 2018-09-30

## 2018-09-30 RX ORDER — HALOPERIDOL DECANOATE 100 MG/ML
1 INJECTION INTRAMUSCULAR EVERY 6 HOURS
Qty: 0 | Refills: 0 | Status: DISCONTINUED | OUTPATIENT
Start: 2018-09-30 | End: 2018-09-30

## 2018-09-30 RX ADMIN — Medication 325 MILLIGRAM(S): at 12:41

## 2018-09-30 RX ADMIN — HEPARIN SODIUM 5000 UNIT(S): 5000 INJECTION INTRAVENOUS; SUBCUTANEOUS at 06:57

## 2018-09-30 RX ADMIN — Medication 2 MILLIGRAM(S): at 04:18

## 2018-09-30 RX ADMIN — SIMETHICONE 80 MILLIGRAM(S): 80 TABLET, CHEWABLE ORAL at 12:41

## 2018-09-30 RX ADMIN — HEPARIN SODIUM 5000 UNIT(S): 5000 INJECTION INTRAVENOUS; SUBCUTANEOUS at 17:36

## 2018-09-30 RX ADMIN — Medication 2 MILLIGRAM(S): at 02:43

## 2018-09-30 RX ADMIN — Medication 2 MILLIGRAM(S): at 21:37

## 2018-09-30 RX ADMIN — Medication 1 MILLIGRAM(S): at 12:41

## 2018-09-30 RX ADMIN — HALOPERIDOL DECANOATE 2 MILLIGRAM(S): 100 INJECTION INTRAMUSCULAR at 04:17

## 2018-09-30 RX ADMIN — HALOPERIDOL DECANOATE 1 MILLIGRAM(S): 100 INJECTION INTRAMUSCULAR at 15:20

## 2018-09-30 NOTE — CHART NOTE - NSCHARTNOTEFT_GEN_A_CORE
Called by staff pt becoming increasingly agitated and stating this is not his room.  Pt reoriented to hospital and despite this he still has escalations of his agitation    REVIEW OF SYSTEMS:  CONSTITUTIONAL: No fever, weight loss, or fatigue  EYES: No eye pain, visual disturbances, or discharge  EENT:  No difficulty hearing, tinnitus, vertigo; No sinus or throat pain  NECK: No pain or stiffness  BREASTS: No pain, masses, or nipple discharge  RESPIRATORY: No cough, wheezing, chills or hemoptysis; No shortness of breath  CARDIOVASCULAR: No chest pain, palpitations, dizziness, or leg swelling  GASTROINTESTINAL: No abdominal or epigastric pain. No nausea, vomiting, or hematemesis; No diarrhea or constipation. No melena or hematochezia.  GENITOURINARY: No dysuria, frequency, hematuria, or incontinence  NEUROLOGICAL: No headaches, memory loss, loss of strength, numbness, or tremors  SKIN: No itching, burning, rashes, or lesions   LYMPH NODES: No enlarged glands  ENDOCRINE: No heat or cold intolerance; No hair loss  MUSCULOSKELETAL: No joint pain or swelling; No muscle, back, or extremity pain  PSYCHIATRIC: No depression, anxiety, mood swings, or difficulty sleeping  HEME/LYMPH: No easy bruising, or bleeding gums  ALLERGY AND IMMUNOLOGIC: No hives or eczema    PAST MEDICAL & SURGICAL HISTORY:  Anemia, unspecified type  Hyperlipidemia, unspecified hyperlipidemia type  Diabetes mellitus of other type with complication  Hypertension, unspecified type  No significant past surgical history    Patient is a 68y old  Male who presents with a chief complaint of sepsis (29 Sep 2018 13:07)  Current medications:   acetaminophen   Tablet .. 650 milliGRAM(s) Oral every 6 hours PRN  aspirin enteric coated 325 milliGRAM(s) Oral daily  atorvastatin 20 milliGRAM(s) Oral at bedtime  cefTAZidime/avibactam IVPB      cefTAZidime/avibactam IVPB 2.5 Gram(s) IV Intermittent every 8 hours  dextrose 40% Gel 15 Gram(s) Oral once PRN  dextrose 5%. 1000 milliLiter(s) IV Continuous <Continuous>  dextrose 50% Injectable 12.5 Gram(s) IV Push once  dextrose 50% Injectable 25 Gram(s) IV Push once  dextrose 50% Injectable 25 Gram(s) IV Push once  folic acid 1 milliGRAM(s) Oral daily  glucagon  Injectable 1 milliGRAM(s) IntraMuscular once PRN  heparin  Injectable 5000 Unit(s) SubCutaneous every 12 hours  insulin lispro (HumaLOG) corrective regimen sliding scale   SubCutaneous three times a day before meals  insulin lispro (HumaLOG) corrective regimen sliding scale   SubCutaneous at bedtime  LORazepam   Injectable 2 milliGRAM(s) IV Push once  pantoprazole    Tablet 40 milliGRAM(s) Oral before breakfast  simethicone 80 milliGRAM(s) Chew four times a day    Vital Signs Last 24 Hrs  T(C): 98.6 (30 Sep 2018 01:26), Max: 98.6 (30 Sep 2018 01:26)  T(F): 209.4 (30 Sep 2018 01:26), Max: 209.4 (30 Sep 2018 01:26)  HR: 88 (30 Sep 2018 01:26) (72 - 88)  BP: 118/62 (30 Sep 2018 01:26) (118/62 - 166/72)  BP(mean): --  RR: 18 (30 Sep 2018 01:26) (18 - 20)  SpO2: 95% (30 Sep 2018 01:26) (95% - 99%)  Labs:   Serum lactate 1.1      Impression:  1- agitation secondary to displacement from his home  Plan: 1- serum lactate to r/o rise in sepsis (WNL)          2- ativan for agitation i.v. x one dose          3- observe, re-evaluate as necessary

## 2018-09-30 NOTE — CHART NOTE - NSCHARTNOTEFT_GEN_A_CORE
Pt had PICC line right upper arm. Pt pulled PICC line out. Picc line intact. No bleeding , no erythema , no discharge, soft and nontender at insertion site .

## 2018-09-30 NOTE — PROGRESS NOTE ADULT - SUBJECTIVE AND OBJECTIVE BOX
YAMIL EMMIE  ----------------------------------------  The patient was seen and evaluated for sepsis.  The patient is in no acute distress.  Overnight events noted. Appears disoriented. Asks to go home.    Vital Signs Last 24 Hrs  T(C): 37 (30 Sep 2018 08:30), Max: 98.6 (30 Sep 2018 01:26)  T(F): 98.6 (30 Sep 2018 08:30), Max: 209.4 (30 Sep 2018 01:26)  HR: 107 (30 Sep 2018 08:30) (73 - 110)  BP: 159/80 (30 Sep 2018 08:30) (118/62 - 166/72)  BP(mean): --  RR: 20 (30 Sep 2018 08:30) (18 - 20)  SpO2: 95% (30 Sep 2018 01:) (95% - 96%)    CAPILLARY BLOOD GLUCOSE  POCT Blood Glucose.: 92 mg/dL (30 Sep 2018 08:19)  POCT Blood Glucose.: 118 mg/dL (29 Sep 2018 21:41)  POCT Blood Glucose.: 102 mg/dL (29 Sep 2018 16:53)  POCT Blood Glucose.: 95 mg/dL (29 Sep 2018 12:07)    PHYSICAL EXAMINATION:  ----------------------------------------  General appearance: No acute distress, Awake, Alert  HEENT: Normocephalic, Atraumatic, Conjunctiva clear, EOMI  Neck: Supple, No JVD, No tenderness  Lungs: Clear to auscultation, Breath sound equal bilaterally, No wheezes, No rales  Cardiovascular: S1S2, Regular rhythm  Abdomen: Soft, Nontender, Nondistended, No guarding/rebound, Positive bowel sounds  Extremities: No clubbing, No cyanosis, No edema, No calf tenderness  Neuro: Strength equal bilaterally, No tremors  Psychiatric: Appropriate mood, Normal affect    LABORATORY STUDIES:  ----------------------------------------             9.2    11.8  )-----------( 399      ( 30 Sep 2018 08:50 )             29.2     09-30    141  |  105  |  11.0  ----------------------------<  92  3.7   |  24.0  |  0.84    Ca    8.6      30 Sep 2018 08:50    TPro  8.5  /  Alb  3.5  /  TBili  0.3<L>  /  DBili  x   /  AST  28  /  ALT  30  /  AlkPhos  96      LIVER FUNCTIONS - ( 28 Sep 2018 19:37 )  Alb: 3.5 g/dL / Pro: 8.5 g/dL / ALK PHOS: 96 U/L / ALT: 30 U/L / AST: 28 U/L / GGT: x           Urinalysis Basic - ( 28 Sep 2018 21:42 )  Color: Yellow / Appearance: Clear / S.020 / pH: x  Gluc: x / Ketone: Trace  / Bili: Negative / Urobili: Negative mg/dL   Blood: x / Protein: 500 mg/dL / Nitrite: Negative   Leuk Esterase: Trace / RBC: 0-2 /HPF / WBC 3-5   Sq Epi: x / Non Sq Epi: Occasional / Bacteria: Occasional    Culture - Urine (collected 28 Sep 2018 21:43)  Source: .Urine Catheterized  Final Report (29 Sep 2018 17:02):    No growth    MEDICATIONS  (STANDING):  aspirin enteric coated 325 milliGRAM(s) Oral daily  atorvastatin 20 milliGRAM(s) Oral at bedtime  cefTAZidime/avibactam IVPB      cefTAZidime/avibactam IVPB 2.5 Gram(s) IV Intermittent every 8 hours  dextrose 5%. 1000 milliLiter(s) (50 mL/Hr) IV Continuous <Continuous>  dextrose 50% Injectable 12.5 Gram(s) IV Push once  dextrose 50% Injectable 25 Gram(s) IV Push once  dextrose 50% Injectable 25 Gram(s) IV Push once  folic acid 1 milliGRAM(s) Oral daily  heparin  Injectable 5000 Unit(s) SubCutaneous every 12 hours  insulin lispro (HumaLOG) corrective regimen sliding scale   SubCutaneous three times a day before meals  insulin lispro (HumaLOG) corrective regimen sliding scale   SubCutaneous at bedtime  pantoprazole    Tablet 40 milliGRAM(s) Oral before breakfast  simethicone 80 milliGRAM(s) Chew four times a day    MEDICATIONS  (PRN):  acetaminophen   Tablet .. 650 milliGRAM(s) Oral every 6 hours PRN Temp greater or equal to 38C (100.4F)  dextrose 40% Gel 15 Gram(s) Oral once PRN Blood Glucose LESS THAN 70 milliGRAM(s)/deciliter  glucagon  Injectable 1 milliGRAM(s) IntraMuscular once PRN Glucose LESS THAN 70 milligrams/deciliter      ASSESSMENT / PLAN:  ----------------------------------------  Sepsis - On ceftazidime/avibactam. Infectious Disease consultation noted. Afebrile with resolved leukocytosis. Repeat blood culture results pending.    Diabetes - Insulin coverage, close monitoring of blood glucose levels.    Acute kidney injury - Improved with intravenous fluids.    Tibial plateau fracture - [place] brace bent. To be replaced. YAMIL EMMIE  ----------------------------------------  The patient was seen and evaluated for sepsis.  The patient is in no acute distress.  Overnight events noted. Appears disoriented. Asks to go home.    Vital Signs Last 24 Hrs  T(C): 37 (30 Sep 2018 08:30), Max: 98.6 (30 Sep 2018 01:26)  T(F): 98.6 (30 Sep 2018 08:30), Max: 209.4 (30 Sep 2018 01:26)  HR: 107 (30 Sep 2018 08:30) (73 - 110)  BP: 159/80 (30 Sep 2018 08:30) (118/62 - 166/72)  BP(mean): --  RR: 20 (30 Sep 2018 08:30) (18 - 20)  SpO2: 95% (30 Sep 2018 01:) (95% - 96%)    CAPILLARY BLOOD GLUCOSE  POCT Blood Glucose.: 92 mg/dL (30 Sep 2018 08:19)  POCT Blood Glucose.: 118 mg/dL (29 Sep 2018 21:41)  POCT Blood Glucose.: 102 mg/dL (29 Sep 2018 16:53)  POCT Blood Glucose.: 95 mg/dL (29 Sep 2018 12:07)    PHYSICAL EXAMINATION:  ----------------------------------------  General appearance: No acute distress, Awake, Alert  HEENT: Normocephalic, Atraumatic, Conjunctiva clear, EOMI  Neck: Supple, No JVD, No tenderness  Lungs: Clear to auscultation, Breath sound equal bilaterally, No wheezes, No rales  Cardiovascular: S1S2, Regular rhythm  Abdomen: Soft, Nontender, Nondistended, No guarding/rebound, Positive bowel sounds  Extremities: No clubbing, No cyanosis, No edema, No calf tenderness  Neuro: Strength equal bilaterally, No tremors  Psychiatric: Appropriate mood, Normal affect    LABORATORY STUDIES:  ----------------------------------------             9.2    11.8  )-----------( 399      ( 30 Sep 2018 08:50 )             29.2     09-30    141  |  105  |  11.0  ----------------------------<  92  3.7   |  24.0  |  0.84    Ca    8.6      30 Sep 2018 08:50    TPro  8.5  /  Alb  3.5  /  TBili  0.3<L>  /  DBili  x   /  AST  28  /  ALT  30  /  AlkPhos  96      LIVER FUNCTIONS - ( 28 Sep 2018 19:37 )  Alb: 3.5 g/dL / Pro: 8.5 g/dL / ALK PHOS: 96 U/L / ALT: 30 U/L / AST: 28 U/L / GGT: x           Urinalysis Basic - ( 28 Sep 2018 21:42 )  Color: Yellow / Appearance: Clear / S.020 / pH: x  Gluc: x / Ketone: Trace  / Bili: Negative / Urobili: Negative mg/dL   Blood: x / Protein: 500 mg/dL / Nitrite: Negative   Leuk Esterase: Trace / RBC: 0-2 /HPF / WBC 3-5   Sq Epi: x / Non Sq Epi: Occasional / Bacteria: Occasional    Culture - Urine (collected 28 Sep 2018 21:43)  Source: .Urine Catheterized  Final Report (29 Sep 2018 17:02):    No growth    MEDICATIONS  (STANDING):  aspirin enteric coated 325 milliGRAM(s) Oral daily  atorvastatin 20 milliGRAM(s) Oral at bedtime  cefTAZidime/avibactam IVPB      cefTAZidime/avibactam IVPB 2.5 Gram(s) IV Intermittent every 8 hours  dextrose 5%. 1000 milliLiter(s) (50 mL/Hr) IV Continuous <Continuous>  dextrose 50% Injectable 12.5 Gram(s) IV Push once  dextrose 50% Injectable 25 Gram(s) IV Push once  dextrose 50% Injectable 25 Gram(s) IV Push once  folic acid 1 milliGRAM(s) Oral daily  heparin  Injectable 5000 Unit(s) SubCutaneous every 12 hours  insulin lispro (HumaLOG) corrective regimen sliding scale   SubCutaneous three times a day before meals  insulin lispro (HumaLOG) corrective regimen sliding scale   SubCutaneous at bedtime  pantoprazole    Tablet 40 milliGRAM(s) Oral before breakfast  simethicone 80 milliGRAM(s) Chew four times a day    MEDICATIONS  (PRN):  acetaminophen   Tablet .. 650 milliGRAM(s) Oral every 6 hours PRN Temp greater or equal to 38C (100.4F)  dextrose 40% Gel 15 Gram(s) Oral once PRN Blood Glucose LESS THAN 70 milliGRAM(s)/deciliter  glucagon  Injectable 1 milliGRAM(s) IntraMuscular once PRN Glucose LESS THAN 70 milligrams/deciliter      ASSESSMENT / PLAN:  ----------------------------------------  Sepsis - On ceftazidime/avibactam. Infectious Disease consultation noted. Afebrile with resolved leukocytosis. Repeat blood culture results pending.    Diabetes - Insulin coverage, close monitoring of blood glucose levels.    Acute kidney injury - Improved with intravenous fluids.    Tibial plateau fracture - Episodes of agitation overnight resulting in the [place] brace being damaged. To replace brace. Haloperidol as needed. On constant observation. YAMIL EMMIE  ----------------------------------------  The patient was seen and evaluated for sepsis.  The patient is in no acute distress.  Overnight events noted. Appears disoriented. Asks to go home.    Vital Signs Last 24 Hrs  T(C): 37 (30 Sep 2018 08:30), Max: 98.6 (30 Sep 2018 01:26)  T(F): 98.6 (30 Sep 2018 08:30), Max: 209.4 (30 Sep 2018 01:26)  HR: 107 (30 Sep 2018 08:30) (73 - 110)  BP: 159/80 (30 Sep 2018 08:30) (118/62 - 166/72)  BP(mean): --  RR: 20 (30 Sep 2018 08:30) (18 - 20)  SpO2: 95% (30 Sep 2018 01:) (95% - 96%)    CAPILLARY BLOOD GLUCOSE  POCT Blood Glucose.: 92 mg/dL (30 Sep 2018 08:19)  POCT Blood Glucose.: 118 mg/dL (29 Sep 2018 21:41)  POCT Blood Glucose.: 102 mg/dL (29 Sep 2018 16:53)  POCT Blood Glucose.: 95 mg/dL (29 Sep 2018 12:07)    PHYSICAL EXAMINATION:  ----------------------------------------  General appearance: No acute distress, Awake, Alert  HEENT: Normocephalic, Atraumatic, Conjunctiva clear, EOMI  Neck: Supple, No JVD, No tenderness  Lungs: Clear to auscultation, Breath sound equal bilaterally, No wheezes, No rales  Cardiovascular: S1S2, Regular rhythm  Abdomen: Soft, Nontender, Nondistended, No guarding/rebound, Positive bowel sounds  Extremities: No clubbing, No cyanosis, No edema, No calf tenderness  Neuro: Strength equal bilaterally, No tremors  Psychiatric: Appropriate mood, Normal affect    LABORATORY STUDIES:  ----------------------------------------             9.2    11.8  )-----------( 399      ( 30 Sep 2018 08:50 )             29.2     09-30    141  |  105  |  11.0  ----------------------------<  92  3.7   |  24.0  |  0.84    Ca    8.6      30 Sep 2018 08:50    TPro  8.5  /  Alb  3.5  /  TBili  0.3<L>  /  DBili  x   /  AST  28  /  ALT  30  /  AlkPhos  96      LIVER FUNCTIONS - ( 28 Sep 2018 19:37 )  Alb: 3.5 g/dL / Pro: 8.5 g/dL / ALK PHOS: 96 U/L / ALT: 30 U/L / AST: 28 U/L / GGT: x           Urinalysis Basic - ( 28 Sep 2018 21:42 )  Color: Yellow / Appearance: Clear / S.020 / pH: x  Gluc: x / Ketone: Trace  / Bili: Negative / Urobili: Negative mg/dL   Blood: x / Protein: 500 mg/dL / Nitrite: Negative   Leuk Esterase: Trace / RBC: 0-2 /HPF / WBC 3-5   Sq Epi: x / Non Sq Epi: Occasional / Bacteria: Occasional    Culture - Urine (collected 28 Sep 2018 21:43)  Source: .Urine Catheterized  Final Report (29 Sep 2018 17:02):    No growth    MEDICATIONS  (STANDING):  aspirin enteric coated 325 milliGRAM(s) Oral daily  atorvastatin 20 milliGRAM(s) Oral at bedtime  cefTAZidime/avibactam IVPB      cefTAZidime/avibactam IVPB 2.5 Gram(s) IV Intermittent every 8 hours  dextrose 5%. 1000 milliLiter(s) (50 mL/Hr) IV Continuous <Continuous>  dextrose 50% Injectable 12.5 Gram(s) IV Push once  dextrose 50% Injectable 25 Gram(s) IV Push once  dextrose 50% Injectable 25 Gram(s) IV Push once  folic acid 1 milliGRAM(s) Oral daily  heparin  Injectable 5000 Unit(s) SubCutaneous every 12 hours  insulin lispro (HumaLOG) corrective regimen sliding scale   SubCutaneous three times a day before meals  insulin lispro (HumaLOG) corrective regimen sliding scale   SubCutaneous at bedtime  pantoprazole    Tablet 40 milliGRAM(s) Oral before breakfast  simethicone 80 milliGRAM(s) Chew four times a day    MEDICATIONS  (PRN):  acetaminophen   Tablet .. 650 milliGRAM(s) Oral every 6 hours PRN Temp greater or equal to 38C (100.4F)  dextrose 40% Gel 15 Gram(s) Oral once PRN Blood Glucose LESS THAN 70 milliGRAM(s)/deciliter  glucagon  Injectable 1 milliGRAM(s) IntraMuscular once PRN Glucose LESS THAN 70 milligrams/deciliter      ASSESSMENT / PLAN:  ----------------------------------------  Sepsis / Bactermia - On ceftazidime/avibactam from prior admission for bacteremia. Infectious Disease consultation noted. Afebrile with resolved leukocytosis. Repeat blood culture results pending.    Diabetes - Insulin coverage, close monitoring of blood glucose levels.    Acute kidney injury - Improved with intravenous fluids.    Tibial plateau fracture - Episodes of agitation overnight resulting in the [place] brace being damaged. To replace brace. Haloperidol as needed. On constant observation. YAMIL EMMIE  ----------------------------------------  The patient was seen and evaluated for sepsis.  The patient is in no acute distress.  Overnight events noted. Appears disoriented. Asks to go home.    HPI:  68M with a recent admission for a tibial plateau fracture and sepsis due to Klebsiella UTI/bacteremia presented with agitation. On presentation, the patient was noted to be febrile(101.6), WBC(23.9). Urinalysis and respiratory panel were negative. CT of the head was without acute intracranial pathology. CT of the chest, abdomen, and pelvis noted lytic and sclerotic lesions in the pelvis but no acute pathology. The patient was continued on intravenous antibiotics which were started on the prior admission and was seen by Infectious Disease in consultation. Repeat laboratory studies the following day noted resolution of the leukocytosis. The patient developed agitation overnight and the [place] brace was damaged.    Vital Signs Last 24 Hrs  T(C): 37 (30 Sep 2018 08:30), Max: 98.6 (30 Sep 2018 01:26)  T(F): 98.6 (30 Sep 2018 08:30), Max: 209.4 (30 Sep 2018 01:26)  HR: 107 (30 Sep 2018 08:30) (73 - 110)  BP: 159/80 (30 Sep 2018 08:30) (118/62 - 166/72)  BP(mean): --  RR: 20 (30 Sep 2018 08:30) (18 - 20)  SpO2: 95% (30 Sep 2018 01:26) (95% - 96%)    CAPILLARY BLOOD GLUCOSE  POCT Blood Glucose.: 92 mg/dL (30 Sep 2018 08:19)  POCT Blood Glucose.: 118 mg/dL (29 Sep 2018 21:41)  POCT Blood Glucose.: 102 mg/dL (29 Sep 2018 16:53)  POCT Blood Glucose.: 95 mg/dL (29 Sep 2018 12:07)    PHYSICAL EXAMINATION:  ----------------------------------------  General appearance: No acute distress, Awake, Alert  HEENT: Normocephalic, Atraumatic, Conjunctiva clear, EOMI  Neck: Supple, No JVD, No tenderness  Lungs: Clear to auscultation, Breath sound equal bilaterally, No wheezes, No rales  Cardiovascular: S1S2, Regular rhythm  Abdomen: Soft, Nontender, Nondistended, No guarding/rebound, Positive bowel sounds  Extremities: No clubbing, No cyanosis, No edema, No calf tenderness  Neuro: Strength equal bilaterally, No tremors  Psychiatric: Appropriate mood, Normal affect    LABORATORY STUDIES:  ----------------------------------------             9.2    11.8  )-----------( 399      ( 30 Sep 2018 08:50 )             29.2     09-    141  |  105  |  11.0  ----------------------------<  92  3.7   |  24.0  |  0.84    Ca    8.6      30 Sep 2018 08:50    TPro  8.5  /  Alb  3.5  /  TBili  0.3<L>  /  DBili  x   /  AST  28  /  ALT  30  /  AlkPhos  96  -    LIVER FUNCTIONS - ( 28 Sep 2018 19:37 )  Alb: 3.5 g/dL / Pro: 8.5 g/dL / ALK PHOS: 96 U/L / ALT: 30 U/L / AST: 28 U/L / GGT: x           Urinalysis Basic - ( 28 Sep 2018 21:42 )  Color: Yellow / Appearance: Clear / S.020 / pH: x  Gluc: x / Ketone: Trace  / Bili: Negative / Urobili: Negative mg/dL   Blood: x / Protein: 500 mg/dL / Nitrite: Negative   Leuk Esterase: Trace / RBC: 0-2 /HPF / WBC 3-5   Sq Epi: x / Non Sq Epi: Occasional / Bacteria: Occasional    Culture - Urine (collected 28 Sep 2018 21:43)  Source: .Urine Catheterized  Final Report (29 Sep 2018 17:02):    No growth    MEDICATIONS  (STANDING):  aspirin enteric coated 325 milliGRAM(s) Oral daily  atorvastatin 20 milliGRAM(s) Oral at bedtime  cefTAZidime/avibactam IVPB      cefTAZidime/avibactam IVPB 2.5 Gram(s) IV Intermittent every 8 hours  dextrose 5%. 1000 milliLiter(s) (50 mL/Hr) IV Continuous <Continuous>  dextrose 50% Injectable 12.5 Gram(s) IV Push once  dextrose 50% Injectable 25 Gram(s) IV Push once  dextrose 50% Injectable 25 Gram(s) IV Push once  folic acid 1 milliGRAM(s) Oral daily  heparin  Injectable 5000 Unit(s) SubCutaneous every 12 hours  insulin lispro (HumaLOG) corrective regimen sliding scale   SubCutaneous three times a day before meals  insulin lispro (HumaLOG) corrective regimen sliding scale   SubCutaneous at bedtime  pantoprazole    Tablet 40 milliGRAM(s) Oral before breakfast  simethicone 80 milliGRAM(s) Chew four times a day    MEDICATIONS  (PRN):  acetaminophen   Tablet .. 650 milliGRAM(s) Oral every 6 hours PRN Temp greater or equal to 38C (100.4F)  dextrose 40% Gel 15 Gram(s) Oral once PRN Blood Glucose LESS THAN 70 milliGRAM(s)/deciliter  glucagon  Injectable 1 milliGRAM(s) IntraMuscular once PRN Glucose LESS THAN 70 milligrams/deciliter      ASSESSMENT / PLAN:  ----------------------------------------  Sepsis / Bactermia - On ceftazidime/avibactam from prior admission for bacteremia. Infectious Disease consultation noted. Afebrile with resolved leukocytosis. Repeat blood culture results pending.    Diabetes - Insulin coverage, close monitoring of blood glucose levels.    Acute kidney injury - Improved with intravenous fluids.    Tibial plateau fracture - Episodes of agitation overnight resulting in the [place] brace being damaged. To replace brace. Haloperidol as needed. On constant observation.

## 2018-10-01 ENCOUNTER — OUTPATIENT (OUTPATIENT)
Dept: OUTPATIENT SERVICES | Facility: HOSPITAL | Age: 68
LOS: 1 days | End: 2018-10-01
Payer: MEDICARE

## 2018-10-01 DIAGNOSIS — E13.8 OTHER SPECIFIED DIABETES MELLITUS WITH UNSPECIFIED COMPLICATIONS: ICD-10-CM

## 2018-10-01 DIAGNOSIS — R50.9 FEVER, UNSPECIFIED: ICD-10-CM

## 2018-10-01 DIAGNOSIS — A41.9 SEPSIS, UNSPECIFIED ORGANISM: ICD-10-CM

## 2018-10-01 DIAGNOSIS — R78.81 BACTEREMIA: ICD-10-CM

## 2018-10-01 DIAGNOSIS — S82.143A DISPLACED BICONDYLAR FRACTURE OF UNSPECIFIED TIBIA, INITIAL ENCOUNTER FOR CLOSED FRACTURE: ICD-10-CM

## 2018-10-01 DIAGNOSIS — F43.25 ADJUSTMENT DISORDER WITH MIXED DISTURBANCE OF EMOTIONS AND CONDUCT: ICD-10-CM

## 2018-10-01 DIAGNOSIS — Z29.9 ENCOUNTER FOR PROPHYLACTIC MEASURES, UNSPECIFIED: ICD-10-CM

## 2018-10-01 DIAGNOSIS — F79 UNSPECIFIED INTELLECTUAL DISABILITIES: ICD-10-CM

## 2018-10-01 DIAGNOSIS — M89.8X5 OTHER SPECIFIED DISORDERS OF BONE, THIGH: ICD-10-CM

## 2018-10-01 DIAGNOSIS — R45.1 RESTLESSNESS AND AGITATION: ICD-10-CM

## 2018-10-01 LAB
APPEARANCE UR: CLEAR — SIGNIFICANT CHANGE UP
BACTERIA # UR AUTO: ABNORMAL
BILIRUB UR-MCNC: NEGATIVE — SIGNIFICANT CHANGE UP
COLOR SPEC: YELLOW — SIGNIFICANT CHANGE UP
DIFF PNL FLD: ABNORMAL
EPI CELLS # UR: SIGNIFICANT CHANGE UP
GLUCOSE BLDC GLUCOMTR-MCNC: 121 MG/DL — HIGH (ref 70–99)
GLUCOSE BLDC GLUCOMTR-MCNC: 160 MG/DL — HIGH (ref 70–99)
GLUCOSE BLDC GLUCOMTR-MCNC: 160 MG/DL — HIGH (ref 70–99)
GLUCOSE BLDC GLUCOMTR-MCNC: 99 MG/DL — SIGNIFICANT CHANGE UP (ref 70–99)
GLUCOSE UR QL: NEGATIVE MG/DL — SIGNIFICANT CHANGE UP
HYALINE CASTS # UR AUTO: ABNORMAL /LPF
KETONES UR-MCNC: NEGATIVE — SIGNIFICANT CHANGE UP
LEUKOCYTE ESTERASE UR-ACNC: ABNORMAL
NITRITE UR-MCNC: NEGATIVE — SIGNIFICANT CHANGE UP
PH UR: 6 — SIGNIFICANT CHANGE UP (ref 5–8)
PROT UR-MCNC: 500 MG/DL
RBC CASTS # UR COMP ASSIST: SIGNIFICANT CHANGE UP /HPF (ref 0–4)
SP GR SPEC: 1.01 — SIGNIFICANT CHANGE UP (ref 1.01–1.02)
UROBILINOGEN FLD QL: NEGATIVE MG/DL — SIGNIFICANT CHANGE UP
WBC UR QL: SIGNIFICANT CHANGE UP

## 2018-10-01 PROCEDURE — 99223 1ST HOSP IP/OBS HIGH 75: CPT

## 2018-10-01 PROCEDURE — 73590 X-RAY EXAM OF LOWER LEG: CPT | Mod: 26,LT

## 2018-10-01 PROCEDURE — 99232 SBSQ HOSP IP/OBS MODERATE 35: CPT

## 2018-10-01 PROCEDURE — 99233 SBSQ HOSP IP/OBS HIGH 50: CPT | Mod: GC

## 2018-10-01 RX ORDER — SACCHAROMYCES BOULARDII 250 MG
250 POWDER IN PACKET (EA) ORAL
Qty: 0 | Refills: 0 | Status: DISCONTINUED | OUTPATIENT
Start: 2018-10-01 | End: 2018-10-09

## 2018-10-01 RX ADMIN — SIMETHICONE 80 MILLIGRAM(S): 80 TABLET, CHEWABLE ORAL at 12:33

## 2018-10-01 RX ADMIN — Medication 250 MILLIGRAM(S): at 18:04

## 2018-10-01 RX ADMIN — Medication 325 MILLIGRAM(S): at 12:33

## 2018-10-01 RX ADMIN — ATORVASTATIN CALCIUM 20 MILLIGRAM(S): 80 TABLET, FILM COATED ORAL at 21:24

## 2018-10-01 RX ADMIN — SIMETHICONE 80 MILLIGRAM(S): 80 TABLET, CHEWABLE ORAL at 05:57

## 2018-10-01 RX ADMIN — SIMETHICONE 80 MILLIGRAM(S): 80 TABLET, CHEWABLE ORAL at 21:24

## 2018-10-01 RX ADMIN — SIMETHICONE 80 MILLIGRAM(S): 80 TABLET, CHEWABLE ORAL at 18:04

## 2018-10-01 RX ADMIN — Medication 1 MILLIGRAM(S): at 12:33

## 2018-10-01 RX ADMIN — Medication 650 MILLIGRAM(S): at 15:18

## 2018-10-01 RX ADMIN — Medication 2: at 16:58

## 2018-10-01 RX ADMIN — HEPARIN SODIUM 5000 UNIT(S): 5000 INJECTION INTRAVENOUS; SUBCUTANEOUS at 05:47

## 2018-10-01 RX ADMIN — PANTOPRAZOLE SODIUM 40 MILLIGRAM(S): 20 TABLET, DELAYED RELEASE ORAL at 05:57

## 2018-10-01 RX ADMIN — HEPARIN SODIUM 5000 UNIT(S): 5000 INJECTION INTRAVENOUS; SUBCUTANEOUS at 18:04

## 2018-10-01 RX ADMIN — Medication 650 MILLIGRAM(S): at 15:50

## 2018-10-01 NOTE — BEHAVIORAL HEALTH ASSESSMENT NOTE - HPI (INCLUDE ILLNESS QUALITY, SEVERITY, DURATION, TIMING, CONTEXT, MODIFYING FACTORS, ASSOCIATED SIGNS AND SYMPTOMS)
Patient is a 68  year old, male; domiciled with family; h/o intellectual disability, unclear if other psychiatric history; with PMxh DM, hyperlipidemia, anemia  was discharged on 9/27/18 from [facility] after being diagnosed with Klebsiella pneumoniae sepsis and was sent home with PICC line and AVYCAZ till 10/1/18 was brought back to ER by family as he was somewhat more agitated than his baseline. When pt. arrived to ER he had fever of 101.6,  currently being treated with IV abx.  Asked to evaluate patient's agitaiton.      Patient was reportedly agitated the last two previous nights and last night pulled out PICC line, as per report was making statements that this was not his room.  Patient currently calm with 1:1 at bedside.  He is able to state that he is at [facility]. He states he is a little depressed because he wants to go to be with his family. He states that family does visit him.  He reports he slept poorly last night.  He denies any disturbance in appetite. He denies any S/H I/I/P. No psychotic sx's were elicited.  Patient is not able to give description of what led to his hospitalization. He denies any physical complaints.  No manic sx's were elicited.  Patient denies any auditory/visual hallucinations, no delusions were elicited.       Spoke with nurse (Anai) who reported that patient has been calm all day today.  His niece came to visit him and she reportedly felt that this was his baseline behavior.  She states that patient has poor attention even when speaking his native language.  He has not been aggressive or agitated today.      Spoke with 1:1 who has been with patient for the last three hours. She states patient hs not been aggressive. He tries to get out of bed at times but responds to redirection.      Called niece and left voicemail message to call writer back. Patient is a 68  year old, male; domiciled with family; h/o intellectual disability, with no prior psychiatric history, no prior inpatient hospitalizations; no previous attempts, no h/o violence; with PMxh DM, hyperlipidemia, anemia  was discharged on 9/27/18 from [facility] after being diagnosed with Klebsiella pneumoniae sepsis and was sent home with PICC line and AVYCAZ till 10/1/18 was brought back to ER by family as he was somewhat more agitated than his baseline. When pt. arrived to ER he had fever of 101.6,  currently being treated with IV abx.  Asked to evaluate patient's agitation      Patient was reportedly agitated the last two previous nights and last night pulled out PICC line, as per report was making statements that this was not his room.  Patient currently calm with 1:1 at bedside.  He is able to state that he is at [facility]. He states he is a little depressed because he wants to go to be with his family. He states that family does visit him.  He reports he slept poorly last night.  He denies any disturbance in appetite. He denies any S/H I/I/P. No psychotic sx's were elicited.  Patient is not able to give description of what led to his hospitalization. He denies any physical complaints.  No manic sx's were elicited.  Patient denies any auditory/visual hallucinations, no delusions were elicited.       Spoke with niece who reported that problems started when patient fractured his leg.  She was with patient until late yesterday and today. She feels patient mental state in his usual self.  She feels he is frustrated about not being home.  She does not feel patient fully understands that his leg is fractured.  He usually attends Day program during the day.  At times he can get rose and frustrated.   At baseline he walks, bathes himself and takes care of his ADLs.  He can get mad if he feels bothered but is not violent or aggressive.  She states patient did not intentionally pull out PICC line but rather removed as a result of patient rubbing body.  She does not feel patient is a danger to himself or others.  He has not required 1:1's in prior hospitalizations.       Spoke with nurse (Anai) who reported that patient has been calm all day today.  His niece came to visit him and she reportedly felt that this was his baseline behavior.  She states that patient has poor attention even when speaking his native language.  He has not been aggressive or agitated today.      Spoke with 1:1 who has been with patient for the last three hours. She states patient hs not been aggressive. He tries to get out of bed at times but responds to redirection.

## 2018-10-01 NOTE — BEHAVIORAL HEALTH ASSESSMENT NOTE - NSBHCHARTREVIEWVS_PSY_A_CORE FT
Vital Signs Last 24 Hrs  T(C): 37.1 (01 Oct 2018 17:13), Max: 37.1 (01 Oct 2018 17:13)  T(F): 98.7 (01 Oct 2018 17:13), Max: 98.7 (01 Oct 2018 17:13)  HR: 80 (01 Oct 2018 17:13) (72 - 80)  BP: 160/73 (01 Oct 2018 17:13) (124/59 - 160/73)  BP(mean): --  RR: 19 (01 Oct 2018 17:13) (19 - 21)  SpO2: 93% (01 Oct 2018 17:13) (93% - 97%)

## 2018-10-01 NOTE — PROGRESS NOTE ADULT - ASSESSMENT
67 y/o M with h/o MR, HTN, DM Type 2, dyslipidemia, recent Left tibial plateau fracture. had been admitted for Sepsis , BOUCHRA secondary to ATN found to have UTI and Bacteremia with carbapenem resistant klebsiella.  Sent home on Avycaz, now back for fever    - continue Avycaz;; initial end date was 10/1/18; but new fevers,. will extend.   - check blood cultures, Sputum Cx.     - if febrile will need to ADD vancomycin.

## 2018-10-01 NOTE — BEHAVIORAL HEALTH ASSESSMENT NOTE - RISK ASSESSMENT
Moderate- Patient denies any S/H I/I/P, no h/o aggression, no prior suicide attempts, poor frustration tolerance, does not appear to understand current physical limitation, no psychotic sx's were elicited.

## 2018-10-01 NOTE — BEHAVIORAL HEALTH ASSESSMENT NOTE - SUMMARY
Patient is a 68  year old, male; domiciled with family; h/o intellectual disability, unclear if other psychiatric history; with PMxh DM, hyperlipidemia, anemia  was discharged on 9/27/18 from [facility] after being diagnosed with Klebsiella pneumoniae sepsis and was sent home with PICC line and AVYCAZ till 10/1/18 was brought back to ER by family as he was somewhat more agitated than his baseline. When pt. arrived to ER he had fever of 101.6,  currently being treated with IV abx.  Asked to evaluate patient's agitation.  Patient Patient is a 68  year old, male; domiciled with family; h/o intellectual disability, unclear if other psychiatric history; with PMxh DM, hyperlipidemia, anemia  was discharged on 9/27/18 from [facility] after being diagnosed with Klebsiella pneumoniae sepsis and was sent home with PICC line and AVYCAZ till 10/1/18 was brought back to ER by family as he was somewhat more agitated than his baseline. When pt. arrived to ER he had fever of 101.6,  currently being treated with IV abx.  Asked to evaluate patient's agitation.  Patient reportedly was more agitated the last couple of nights. Patient is currently calm, as per niece she feels patient's mental status is baseline.  He is rose, with low frustration tolerance and wants to go home.  She feels he may not have full understanding that he can't walk but did not find him to be more confused than his baseline.  Patient has limited coping skills.  Currently diagnosed with intellectual disability, Adjustment disorder with disturbance in mood and conduct r/o Delirium secondary to bactermia. No S/H I/I/P, no psychosis, no aggression elicited.

## 2018-10-01 NOTE — CONSULT NOTE ADULT - SUBJECTIVE AND OBJECTIVE BOX
Pt Name: YAMIL OLEA    MRN: [id number]      As per request from medicine PA, patient seen and examined at bedside. Patient is German speaking only,  requested. Patient is a 68y Male presenting to the emergency department with a chief complaint of Patient is a 68y old  Male who presents with a chief complaint of sepsis (30 Sep 2018 10:04)  .      REVIEW OF SYSTEMS    General: Alert, responsive, in NAD    Skin/Breast: No rashes, no pruritis   	  Ophthalmologic: No visual changes. No redness.   	  ENMT:	No discharge. No swelling.    Respiratory and Thorax: No difficulty breathing. No cough.  	   Cardiovascular:	No chest pain. No palpitations.    Gastrointestinal:	 No abdominal pain. No diarrhea.     Genitourinary: No dysuria. No bleeding.    Musculoskeletal: SEE HPI.    Neurological: No sensory or motor changes.     Psychiatric: No anxiety or depression.    Hematology/Lymphatics: No swelling.    Endocrine: No Hx of diabetes.    ROS is otherwise negative.    PAST MEDICAL & SURGICAL HISTORY:  PAST MEDICAL & SURGICAL HISTORY:  Anemia, unspecified type  Hyperlipidemia, unspecified hyperlipidemia type  Diabetes mellitus of other type with complication  Hypertension, unspecified type  No significant past surgical history      Allergies: No Known Allergies      Medications: acetaminophen   Tablet .. 650 milliGRAM(s) Oral every 6 hours PRN  aspirin enteric coated 325 milliGRAM(s) Oral daily  atorvastatin 20 milliGRAM(s) Oral at bedtime  cefTAZidime/avibactam IVPB      cefTAZidime/avibactam IVPB 2.5 Gram(s) IV Intermittent every 8 hours  dextrose 40% Gel 15 Gram(s) Oral once PRN  dextrose 5%. 1000 milliLiter(s) IV Continuous <Continuous>  dextrose 50% Injectable 12.5 Gram(s) IV Push once  dextrose 50% Injectable 25 Gram(s) IV Push once  dextrose 50% Injectable 25 Gram(s) IV Push once  folic acid 1 milliGRAM(s) Oral daily  glucagon  Injectable 1 milliGRAM(s) IntraMuscular once PRN  haloperidol    Injectable 2 milliGRAM(s) IntraMuscular every 6 hours PRN  heparin  Injectable 5000 Unit(s) SubCutaneous every 12 hours  insulin lispro (HumaLOG) corrective regimen sliding scale   SubCutaneous three times a day before meals  insulin lispro (HumaLOG) corrective regimen sliding scale   SubCutaneous at bedtime  pantoprazole    Tablet 40 milliGRAM(s) Oral before breakfast  simethicone 80 milliGRAM(s) Chew four times a day      FAMILY HISTORY:  No pertinent family history in first degree relatives  : non-contributory    Social History:     Ambulation: Walking independently [ ] With Cane [ ] With Walker [ ]  Bedbound [ ]                           9.2    11.8  )-----------( 399      ( 30 Sep 2018 08:50 )             29.2       09-30    141  |  105  |  11.0  ----------------------------<  92  3.7   |  24.0  |  0.84    Ca    8.6      30 Sep 2018 08:50        Vital Signs Last 24 Hrs  T(C): 36.6 (01 Oct 2018 08:07), Max: 36.9 (01 Oct 2018 05:49)  T(F): 97.8 (01 Oct 2018 08:07), Max: 98.5 (01 Oct 2018 05:49)  HR: 74 (01 Oct 2018 08:07) (72 - 74)  BP: 124/59 (01 Oct 2018 08:07) (124/59 - 154/71)  BP(mean): --  RR: 21 (01 Oct 2018 08:07) (20 - 21)  SpO2: 95% (01 Oct 2018 08:07) (95% - 97%)    Daily     Daily       PHYSICAL EXAM:      Appearance: Alert, responsive, in no acute distress.    Neurological: Sensation is grossly intact to light touch. 5/5 motor function of all extremities. No focal deficits or weaknesses found.    Skin: no rash on visible skin. Skin is clean, dry and intact. No bleeding. No abrasions. No ulcerations.    Vascular: 2+ distal pulses. Cap refill < 2 sec. No signs of venous insufficiency or stasis. No extremity ulcerations. No cyanosis.    Musculoskeletal:         Left Upper Extremity:       Right Upper Extremity:       Left Lower Extremity:       Right Lower Extremity:    Imaging Studies:    A/P:  Pt is a  68y Male with    found to have    PLAN:   * NPO for OR tomorrow  * IV fluids ordered and to start once NPO  * Pre-operative ABX ordered  *Routine daily anticoagulation held for OR  * Single dose anticoaguation ordered  * Medical clearance requested for procedure  * Bed rest Pt Name: YAMIL OLEA    MRN: [id number]      As per request from medicine PA, patient seen and examined at bedside. Patient is Croatian speaking only, translation done by hospital  [facility]. Patient is nown to the service, he is  a 68y Male s/p left tibia plateau fracture. As per medicine PA, patient became combative and noncompliant, subsequently removed maria brace that was in place. Upon questioning, patient claims that the, "brace broke itself." Currently denies pain, numbness or tingling of left leg. No new orthopedic complaints. Patient agrees to leave brace in place when a new one is fitted for him. He understands the importance of the brace for healing and immobilization.  .      REVIEW OF SYSTEMS    General: Alert, responsive, in NAD    Respiratory and Thorax: No difficulty breathing. No cough.  	   Cardiovascular:	No chest pain. No palpitations.        PAST MEDICAL & SURGICAL HISTORY:  PAST MEDICAL & SURGICAL HISTORY:  Anemia, unspecified type  Hyperlipidemia, unspecified hyperlipidemia type  Diabetes mellitus of other type with complication  Hypertension, unspecified type  No significant past surgical history      Allergies: No Known Allergies      Medications: acetaminophen   Tablet .. 650 milliGRAM(s) Oral every 6 hours PRN  aspirin enteric coated 325 milliGRAM(s) Oral daily  atorvastatin 20 milliGRAM(s) Oral at bedtime  cefTAZidime/avibactam IVPB      cefTAZidime/avibactam IVPB 2.5 Gram(s) IV Intermittent every 8 hours  dextrose 40% Gel 15 Gram(s) Oral once PRN  dextrose 5%. 1000 milliLiter(s) IV Continuous <Continuous>  dextrose 50% Injectable 12.5 Gram(s) IV Push once  dextrose 50% Injectable 25 Gram(s) IV Push once  dextrose 50% Injectable 25 Gram(s) IV Push once  folic acid 1 milliGRAM(s) Oral daily  glucagon  Injectable 1 milliGRAM(s) IntraMuscular once PRN  haloperidol    Injectable 2 milliGRAM(s) IntraMuscular every 6 hours PRN  heparin  Injectable 5000 Unit(s) SubCutaneous every 12 hours  insulin lispro (HumaLOG) corrective regimen sliding scale   SubCutaneous three times a day before meals  insulin lispro (HumaLOG) corrective regimen sliding scale   SubCutaneous at bedtime  pantoprazole    Tablet 40 milliGRAM(s) Oral before breakfast  simethicone 80 milliGRAM(s) Chew four times a day      FAMILY HISTORY:  No pertinent family history in first degree relatives  : non-contributory    Social History:     Ambulation: Walking independently [ ] With Cane [ ] With Walker [ ]  Bedbound [ ]                           9.2    11.8  )-----------( 399      ( 30 Sep 2018 08:50 )             29.2       09-30    141  |  105  |  11.0  ----------------------------<  92  3.7   |  24.0  |  0.84    Ca    8.6      30 Sep 2018 08:50        Vital Signs Last 24 Hrs  T(C): 36.6 (01 Oct 2018 08:07), Max: 36.9 (01 Oct 2018 05:49)  T(F): 97.8 (01 Oct 2018 08:07), Max: 98.5 (01 Oct 2018 05:49)  HR: 74 (01 Oct 2018 08:07) (72 - 74)  BP: 124/59 (01 Oct 2018 08:07) (124/59 - 154/71)  BP(mean): --  RR: 21 (01 Oct 2018 08:07) (20 - 21)  SpO2: 95% (01 Oct 2018 08:07) (95% - 97%)    Daily     Daily       PHYSICAL EXAM:      Appearance: Alert, responsive to person and place, in no acute distress.    Left lower extremity: SILT grossly. +dorsiflexion/plantarflexion. DP 2+. Calf supple NT B/L. Cap refill less than 2 sec.         Imaging Studies:    A/P:  Pt is a  68y Male s/p left tibial plateu fracture from 08/2018    PLAN:   ·	Discussed plan with Dr. Petty  ·	Maria brace ordered- pending fitting  ·	Continue care as per primary team  ·	DVT ppx as per primary team  ·	Pain control as clinically indicated  ·	F/U with Dr. Petty outpatient when discharged from hosptial Pt Name: YAMIL OLEA    MRN: [id number]      As per request from medicine PA, patient seen and examined at bedside. Patient is Citizen of [place] speaking only, translation done by hospital  [facility]. Patient is nown to the service, he is  a 68y Male s/p left tibia plateau fracture. As per medicine PA, patient became combative and noncompliant, subsequently removed maria brace that was in place. Upon questioning, patient claims that the, "brace broke itself." Currently denies pain, numbness or tingling of left leg. No new orthopedic complaints. Patient agrees to leave brace in place when a new one is fitted for him. He understands the importance of the brace for healing and immobilization.  .      REVIEW OF SYSTEMS    General: Alert, responsive, in NAD    Respiratory and Thorax: No difficulty breathing. No cough.  	   Cardiovascular:	No chest pain. No palpitations.        PAST MEDICAL & SURGICAL HISTORY:  PAST MEDICAL & SURGICAL HISTORY:  Anemia, unspecified type  Hyperlipidemia, unspecified hyperlipidemia type  Diabetes mellitus of other type with complication  Hypertension, unspecified type  No significant past surgical history      Allergies: No Known Allergies      Medications: acetaminophen   Tablet .. 650 milliGRAM(s) Oral every 6 hours PRN  aspirin enteric coated 325 milliGRAM(s) Oral daily  atorvastatin 20 milliGRAM(s) Oral at bedtime  cefTAZidime/avibactam IVPB      cefTAZidime/avibactam IVPB 2.5 Gram(s) IV Intermittent every 8 hours  dextrose 40% Gel 15 Gram(s) Oral once PRN  dextrose 5%. 1000 milliLiter(s) IV Continuous <Continuous>  dextrose 50% Injectable 12.5 Gram(s) IV Push once  dextrose 50% Injectable 25 Gram(s) IV Push once  dextrose 50% Injectable 25 Gram(s) IV Push once  folic acid 1 milliGRAM(s) Oral daily  glucagon  Injectable 1 milliGRAM(s) IntraMuscular once PRN  haloperidol    Injectable 2 milliGRAM(s) IntraMuscular every 6 hours PRN  heparin  Injectable 5000 Unit(s) SubCutaneous every 12 hours  insulin lispro (HumaLOG) corrective regimen sliding scale   SubCutaneous three times a day before meals  insulin lispro (HumaLOG) corrective regimen sliding scale   SubCutaneous at bedtime  pantoprazole    Tablet 40 milliGRAM(s) Oral before breakfast  simethicone 80 milliGRAM(s) Chew four times a day      FAMILY HISTORY:  No pertinent family history in first degree relatives  : non-contributory    Social History:                               9.2    11.8  )-----------( 399      ( 30 Sep 2018 08:50 )             29.2       09-30    141  |  105  |  11.0  ----------------------------<  92  3.7   |  24.0  |  0.84    Ca    8.6      30 Sep 2018 08:50        Vital Signs Last 24 Hrs  T(C): 36.6 (01 Oct 2018 08:07), Max: 36.9 (01 Oct 2018 05:49)  T(F): 97.8 (01 Oct 2018 08:07), Max: 98.5 (01 Oct 2018 05:49)  HR: 74 (01 Oct 2018 08:07) (72 - 74)  BP: 124/59 (01 Oct 2018 08:07) (124/59 - 154/71)  BP(mean): --  RR: 21 (01 Oct 2018 08:07) (20 - 21)  SpO2: 95% (01 Oct 2018 08:07) (95% - 97%)    Daily     Daily       PHYSICAL EXAM:      Appearance: Alert, responsive to person and place, in no acute distress.    Left lower extremity: SILT grossly. +dorsiflexion/plantarflexion. DP 2+. Calf supple NT B/L. Cap refill less than 2 sec.             A/P:  Pt is a  68y Male s/p left tibial plateu fracture from 08/2018    PLAN:   ·	Discussed plan with Dr. Petty  ·	Maria brace ordered-keep in place. Pending fitting  ·	Continue care as per primary team  ·	DVT ppx as per primary team  ·	Pain control as clinically indicated  ·	F/U with Dr. Petty outpatient when D/C Pt Name: YAMIL OLEA    MRN: [id number]      As per request from medicine PA, patient seen and examined at bedside. Patient is Congolese speaking only, translation done by hospital  [facility]. Patient is nown to the service, he is  a 68y Male s/p left tibia plateau fracture. As per medicine PA, patient became combative and noncompliant, subsequently removed maria brace that was in place. Upon questioning, patient claims that the, "brace broke itself." Currently denies pain, numbness or tingling of left leg. No new orthopedic complaints. Patient agrees to leave brace in place when a new one is fitted for him. He understands the importance of the brace for healing and immobilization.  .      REVIEW OF SYSTEMS    General: Alert, responsive, in NAD    Respiratory and Thorax: No difficulty breathing. No cough.  	   Cardiovascular:	No chest pain. No palpitations.        PAST MEDICAL & SURGICAL HISTORY:  PAST MEDICAL & SURGICAL HISTORY:  Anemia, unspecified type  Hyperlipidemia, unspecified hyperlipidemia type  Diabetes mellitus of other type with complication  Hypertension, unspecified type  No significant past surgical history      Allergies: No Known Allergies      Medications: acetaminophen   Tablet .. 650 milliGRAM(s) Oral every 6 hours PRN  aspirin enteric coated 325 milliGRAM(s) Oral daily  atorvastatin 20 milliGRAM(s) Oral at bedtime  cefTAZidime/avibactam IVPB      cefTAZidime/avibactam IVPB 2.5 Gram(s) IV Intermittent every 8 hours  dextrose 40% Gel 15 Gram(s) Oral once PRN  dextrose 5%. 1000 milliLiter(s) IV Continuous <Continuous>  dextrose 50% Injectable 12.5 Gram(s) IV Push once  dextrose 50% Injectable 25 Gram(s) IV Push once  dextrose 50% Injectable 25 Gram(s) IV Push once  folic acid 1 milliGRAM(s) Oral daily  glucagon  Injectable 1 milliGRAM(s) IntraMuscular once PRN  haloperidol    Injectable 2 milliGRAM(s) IntraMuscular every 6 hours PRN  heparin  Injectable 5000 Unit(s) SubCutaneous every 12 hours  insulin lispro (HumaLOG) corrective regimen sliding scale   SubCutaneous three times a day before meals  insulin lispro (HumaLOG) corrective regimen sliding scale   SubCutaneous at bedtime  pantoprazole    Tablet 40 milliGRAM(s) Oral before breakfast  simethicone 80 milliGRAM(s) Chew four times a day      FAMILY HISTORY:  No pertinent family history in first degree relatives  : non-contributory    Social History:                               9.2    11.8  )-----------( 399      ( 30 Sep 2018 08:50 )             29.2       09-30    141  |  105  |  11.0  ----------------------------<  92  3.7   |  24.0  |  0.84    Ca    8.6      30 Sep 2018 08:50        Vital Signs Last 24 Hrs  T(C): 36.6 (01 Oct 2018 08:07), Max: 36.9 (01 Oct 2018 05:49)  T(F): 97.8 (01 Oct 2018 08:07), Max: 98.5 (01 Oct 2018 05:49)  HR: 74 (01 Oct 2018 08:07) (72 - 74)  BP: 124/59 (01 Oct 2018 08:07) (124/59 - 154/71)  BP(mean): --  RR: 21 (01 Oct 2018 08:07) (20 - 21)  SpO2: 95% (01 Oct 2018 08:07) (95% - 97%)    Daily     Daily       PHYSICAL EXAM:      Appearance: Alert, responsive to person and place, in no acute distress.    Left lower extremity: SILT grossly. +dorsiflexion/plantarflexion. DP 2+. Calf supple NT B/L. Cap refill less than 2 sec.             A/P:  Pt is a  68y Male s/p left tibial plateu fracture from 08/2018    PLAN:   ·	Discussed plan with Dr. Petty  ·	Maria brace ordered-keep in place. 0-30 degrees  ·	Imaging pending  ·	NWB LLE  ·	Continue care as per primary team  ·	DVT ppx as per primary team  ·	Pain control as clinically indicated  ·	F/U with Dr. Petty outpatient when D/C

## 2018-10-01 NOTE — PROGRESS NOTE ADULT - ASSESSMENT
-68M with a recent admission for a tibial plateau fracture and sepsis due to Klebsiella UTI/bacteremia presented with agitation. On presentation, the patient was noted to be febrile(101.6), WBC(23.9). Urinalysis and respiratory panel were negative. CT of the head was without acute intracranial pathology. CT of the chest, abdomen, and pelvis noted lytic and sclerotic lesions in the pelvis but no acute pathology. The patient was continued on intravenous antibiotics which were started on the prior admission and was seen by Infectious Disease in consultation. Repeat laboratory studies the following day noted resolution of the leukocytosis. The patient developed agitation overnight 9/29 and the [place] brace was damaged. Pt continues to become agitated at night past two nights and took out his PICC line last night.   ---------------------------------------  Sepsis / Bactermia - On ceftazidime/avibactam from prior admission for bacteremia. Infectious Disease consultation noted. Afebrile with resolved leukocytosis. Repeat blood culture results pending.    Diabetes - Insulin coverage, close monitoring of blood glucose levels.    Acute kidney injury - Improved with intravenous fluids.    Tibial plateau fracture - E -68M with a recent admission for a tibial plateau fracture and sepsis due to Klebsiella UTI/bacteremia presented with agitation. On presentation, the patient was noted to be febrile(101.6), WBC(23.9). Urinalysis and respiratory panel were negative. CT of the head was without acute intracranial pathology. CT of the chest, abdomen, and pelvis noted lytic and sclerotic lesions in the pelvis but no acute pathology. The patient was continued on intravenous antibiotics which were started on the prior admission and was seen by Infectious Disease in consultation. Repeat laboratory studies the following day noted resolution of the leukocytosis. The patient developed agitation overnight 9/29 and the [place] brace was damaged. Pt continues to become agitated at night past two nights and took out his PICC line last night.

## 2018-10-01 NOTE — PROGRESS NOTE ADULT - SUBJECTIVE AND OBJECTIVE BOX
CC:   HPI:  67 y/o male with DM, hyperlipidemia, anemia and  MR as per history was discharged on 9/27/18 from [facility] after being diagnosed with Klebsiella pneumoniae sepsis and was sent home with PICC line and AVYCAZ till 10/1/18 was brought back to ER by family as he was somewhat more agitated than his baseline. When pt. arrived to ER he had fever of 101.6, code sepsis was called, pt's wbc on this vist were 23.9 and were within normal range prior to discharge on 9/27/18. As per history there is no cough or  n/v/d. pt. does not contribute to history. pt's ct chest abdomen and pelvis was done in ER and did not show any focus of infection. ct head did not show acute findings as well. no other complaints at the time of admission. pt. got ativan in the ER and is resting comfortably.  As per record pt. was seen by ortho service on august 25th for left tibial plateau fracture and brace and NWB LLE was recommended and out- pt. ortho follow up. (29 Sep 2018 01:11)    REVIEW OF SYSTEMS:    Patient denied fever, chills, abdominal pain, nausea, vomiting, cough, shortness of breath, chest pain or palpitations    Vital Signs Last 24 Hrs  T(C): 36.6 (01 Oct 2018 08:07), Max: 36.9 (01 Oct 2018 05:49)  T(F): 97.8 (01 Oct 2018 08:07), Max: 98.5 (01 Oct 2018 05:49)  HR: 74 (01 Oct 2018 08:07) (72 - 74)  BP: 124/59 (01 Oct 2018 08:07) (124/59 - 154/71)  BP(mean): --  RR: 21 (01 Oct 2018 08:07) (20 - 21)  SpO2: 95% (01 Oct 2018 08:07) (95% - 97%)I&O's Summary    30 Sep 2018 07:01  -  01 Oct 2018 07:00  --------------------------------------------------------  IN: 200 mL / OUT: 0 mL / NET: 200 mL    01 Oct 2018 07:01  -  01 Oct 2018 14:03  --------------------------------------------------------  IN: 0 mL / OUT: 825 mL / NET: -825 mL    CAPILLARY BLOOD GLUCOSE    PHYSICAL EXAM:  GENERAL: NAD, well-groomed  HEENT: PERRL, +EOMI, anicteric, no [place]  NECK: Supple, No JVD   CHEST/LUNG: CTA bilaterally; Normal effort  HEART: S1S2 Normal intensity, no murmurs, gallops or rubs noted  ABDOMEN: Soft, BS Normoactive, NT, ND, no HSM noted  EXTREMITIES:  2+ radial and DP pulses noted, no clubbing, cyanosis, or edema noted, FROM x 4  SKIN: No rashes or lesions noted  NEURO: A&Ox3, no focal deficits noted, CN II-XII intact  PSYCH: normal mood and affect; insight/judgement appropriate  LABS:                        9.2    11.8  )-----------( 399      ( 30 Sep 2018 08:50 )             29.2     09-30    141  |  105  |  11.0  ----------------------------<  92  3.7   |  24.0  |  0.84    Ca    8.6      30 Sep 2018 08:50        RADIOLOGY & ADDITIONAL TESTS:    CXR 9/28/18  IMPRESSION: No gross consolidation is seen. Right-sided PICC line   catheter noted with the distal tip overlying the superior vena cava. No   appreciable pneumothorax is seen.           XR left knee 9/20/18  Impression:  Fracture tibial plateau.   Moderate joint effusion      MEDICATIONS:  MEDICATIONS  (STANDING):  aspirin enteric coated 325 milliGRAM(s) Oral daily  atorvastatin 20 milliGRAM(s) Oral at bedtime  cefTAZidime/avibactam IVPB      cefTAZidime/avibactam IVPB 2.5 Gram(s) IV Intermittent every 8 hours  dextrose 5%. 1000 milliLiter(s) (50 mL/Hr) IV Continuous <Continuous>  dextrose 50% Injectable 12.5 Gram(s) IV Push once  dextrose 50% Injectable 25 Gram(s) IV Push once  dextrose 50% Injectable 25 Gram(s) IV Push once  folic acid 1 milliGRAM(s) Oral daily  heparin  Injectable 5000 Unit(s) SubCutaneous every 12 hours  insulin lispro (HumaLOG) corrective regimen sliding scale   SubCutaneous three times a day before meals  insulin lispro (HumaLOG) corrective regimen sliding scale   SubCutaneous at bedtime  pantoprazole    Tablet 40 milliGRAM(s) Oral before breakfast  saccharomyces boulardii 250 milliGRAM(s) Oral two times a day  simethicone 80 milliGRAM(s) Chew four times a day    MEDICATIONS  (PRN):  acetaminophen   Tablet .. 650 milliGRAM(s) Oral every 6 hours PRN Temp greater or equal to 38C (100.4F)  dextrose 40% Gel 15 Gram(s) Oral once PRN Blood Glucose LESS THAN 70 milliGRAM(s)/deciliter  glucagon  Injectable 1 milliGRAM(s) IntraMuscular once PRN Glucose LESS THAN 70 milligrams/deciliter  haloperidol    Injectable 2 milliGRAM(s) IntraMuscular every 6 hours PRN Aggitation Interval/HPI; Pt became agitated overnight, pulled out PICC line. Chart reviewed. Pt seen/examined by Attending and DONNA dan  via Language Line. Pt calm, alert and in NAD at this time.     REVIEW OF SYSTEMS:    Patient denied fever, chills, abdominal pain, nausea, vomiting, cough, shortness of breath, chest pain or palpitations    Vital Signs Last 24 Hrs  T(C): 36.6 (01 Oct 2018 08:07), Max: 36.9 (01 Oct 2018 05:49)  T(F): 97.8 (01 Oct 2018 08:07), Max: 98.5 (01 Oct 2018 05:49)  HR: 74 (01 Oct 2018 08:07) (72 - 74)  BP: 124/59 (01 Oct 2018 08:07) (124/59 - 154/71)  BP(mean): --  RR: 21 (01 Oct 2018 08:07) (20 - 21)  SpO2: 95% (01 Oct 2018 08:07) (95% - 97%)I&O's Summary    30 Sep 2018 07:01  -  01 Oct 2018 07:00  --------------------------------------------------------  IN: 200 mL / OUT: 0 mL / NET: 200 mL    01 Oct 2018 07:01  -  01 Oct 2018 14:03  --------------------------------------------------------  IN: 0 mL / OUT: 825 mL / NET: -825 mL    CAPILLARY BLOOD GLUCOSE    PHYSICAL EXAM:  GENERAL: NAD, well-groomed  HEENT: PERRL, +EOMI, anicteric  NECK: Supple, No JVD   CHEST/LUNG: CTA bilaterally; Normal effort  HEART: S1S2 Normal intensity, no murmurs, gallops or rubs noted  ABDOMEN: Soft, BS Normoactive, NT, ND, no HSM noted  EXTREMITIES:  2+ radial and DP pulses noted, no clubbing, cyanosis, or edema noted, FROM x 4  SKIN: No rashes or lesions noted  NEURO: A&Ox3, no focal deficits noted, CN II-XII intact  PSYCH: normal mood and affect; insight/judgement appropriate  LABS:                        9.2    11.8  )-----------( 399      ( 30 Sep 2018 08:50 )             29.2     09-30    141  |  105  |  11.0  ----------------------------<  92  3.7   |  24.0  |  0.84    Ca    8.6      30 Sep 2018 08:50        RADIOLOGY & ADDITIONAL TESTS:    CXR 9/28/18  IMPRESSION: No gross consolidation is seen. Right-sided PICC line   catheter noted with the distal tip overlying the superior vena cava. No   appreciable pneumothorax is seen.           XR left knee 9/20/18  Impression:  Fracture tibial plateau.   Moderate joint effusion      MEDICATIONS:  MEDICATIONS  (STANDING):  aspirin enteric coated 325 milliGRAM(s) Oral daily  atorvastatin 20 milliGRAM(s) Oral at bedtime  cefTAZidime/avibactam IVPB      cefTAZidime/avibactam IVPB 2.5 Gram(s) IV Intermittent every 8 hours  dextrose 5%. 1000 milliLiter(s) (50 mL/Hr) IV Continuous <Continuous>  dextrose 50% Injectable 12.5 Gram(s) IV Push once  dextrose 50% Injectable 25 Gram(s) IV Push once  dextrose 50% Injectable 25 Gram(s) IV Push once  folic acid 1 milliGRAM(s) Oral daily  heparin  Injectable 5000 Unit(s) SubCutaneous every 12 hours  insulin lispro (HumaLOG) corrective regimen sliding scale   SubCutaneous three times a day before meals  insulin lispro (HumaLOG) corrective regimen sliding scale   SubCutaneous at bedtime  pantoprazole    Tablet 40 milliGRAM(s) Oral before breakfast  saccharomyces boulardii 250 milliGRAM(s) Oral two times a day  simethicone 80 milliGRAM(s) Chew four times a day    MEDICATIONS  (PRN):  acetaminophen   Tablet .. 650 milliGRAM(s) Oral every 6 hours PRN Temp greater or equal to 38C (100.4F)  dextrose 40% Gel 15 Gram(s) Oral once PRN Blood Glucose LESS THAN 70 milliGRAM(s)/deciliter  glucagon  Injectable 1 milliGRAM(s) IntraMuscular once PRN Glucose LESS THAN 70 milligrams/deciliter  haloperidol    Injectable 2 milliGRAM(s) IntraMuscular every 6 hours PRN Aggitation

## 2018-10-01 NOTE — BEHAVIORAL HEALTH ASSESSMENT NOTE - NSBHCHARTREVIEWLAB_PSY_A_CORE FT
9.2    11.8  )-----------( 399      ( 30 Sep 2018 08:50 )             29.2     -    141  |  105  |  11.0  ----------------------------<  92  3.7   |  24.0  |  0.84    Ca    8.6      30 Sep 2018 08:50          Urinalysis Basic - ( 01 Oct 2018 16:36 )    Color: Yellow / Appearance: Clear / S.015 / pH: x  Gluc: x / Ketone: Negative  / Bili: Negative / Urobili: Negative mg/dL   Blood: x / Protein: 500 mg/dL / Nitrite: Negative   Leuk Esterase: Trace / RBC: 0-2 /HPF / WBC 3-5   Sq Epi: x / Non Sq Epi: Occasional / Bacteria: Occasional

## 2018-10-01 NOTE — PROGRESS NOTE ADULT - SUBJECTIVE AND OBJECTIVE BOX
[facility]  INFECTIOUS DISEASES AND INTERNAL MEDICINE at [place]  =======================================================  José Luis Walton MD  Diplomates American Board of Internal Medicine and Infectious Diseases  =======================================================    YAMIL OLEA [id number]  chief complaint: readmitted after discharge for fevers.   patient seen and examined in follow up.  Chart and labs reviewed.     repeat cultures note sent.     =======================================================  Allergies:  No Known Allergies      =======================================================  Antibiotics:  cefTAZidime/avibactam IVPB      cefTAZidime/avibactam IVPB 2.5 Gram(s) IV Intermittent every 8 hours      =======================================================     REVIEW OF SYSTEMS:  as above  all other ROS are negative    =======================================================    Physical Exam:  Vital Signs Last 24 Hrs  T(C): 36.6 (01 Oct 2018 08:07), Max: 36.9 (01 Oct 2018 05:49)  T(F): 97.8 (01 Oct 2018 08:07), Max: 98.5 (01 Oct 2018 05:49)  HR: 74 (01 Oct 2018 08:07) (72 - 74)  BP: 124/59 (01 Oct 2018 08:07) (124/59 - 154/71)  RR: 21 (01 Oct 2018 08:07) (20 - 21)  SpO2: 95% (01 Oct 2018 08:07) (95% - 97%)    GEN: NAD  HEENT: normocephalic and atraumatic. EOMI. PERRL.    NECK: Supple.   LUNGS: Clear to auscultation.  HEART: Regular rate and rhythm   ABDOMEN: Soft, nontender, and nondistended.  Positive bowel sounds.    : No CVA tenderness  EXTREMITIES: Left leg in immobilizer   MSK: no joint swelling  NEUROLOGIC: Awake, minimal verbalization    PSYCHIATRIC: Unable to assess   SKIN: No Rash    =======================================================  Labs:                        9.2    11.8  )-----------( 399      ( 30 Sep 2018 08:50 )             29.2     09-30    141  |  105  |  11.0  ----------------------------<  92  3.7   |  24.0  |  0.84    Ca    8.6      30 Sep 2018 08:50          Culture - Urine (collected 09-28-18 @ 21:43)  Source: .Urine Catheterized  Final Report (09-29-18 @ 17:02):    No growth    Culture - Blood (collected 09-28-18 @ 19:39)  Source: .Blood Blood-Peripheral    Culture - Blood (collected 09-28-18 @ 19:38)  Source: .Blood Blood-Peripheral    Culture - Sensi (Special) (collected 09-20-18 @ 18:15)  Source: .Other    Culture - Blood (collected 09-20-18 @ 08:00)  Source: .Blood Blood  Final Report (09-25-18 @ 09:01):    No growth at 5 days.    Culture - Blood (collected 09-20-18 @ 07:59)  Source: .Blood Blood  Final Report (09-25-18 @ 09:01):    No growth at 5 days.    Culture - Blood (collected 09-18-18 @ 17:26)  Source: .Blood Blood-Venous  Final Report (09-23-18 @ 19:01):    No growth at 5 days.    Culture - Blood (collected 09-18-18 @ 17:26)  Source: .Blood Blood-Peripheral  Final Report (09-23-18 @ 19:00):    No growth at 5 days.

## 2018-10-01 NOTE — BEHAVIORAL HEALTH ASSESSMENT NOTE - NSBHCONSULTOBSREASON_PSY_A_CORE FT
r/o Delirium which may worsen night, patient has been attempting to get out of bed and requiring frequent re direction-may accidentally harm himself.  Patient is not psychotic, does not require for psychiatric purposes.  No S/H I/I/P

## 2018-10-02 DIAGNOSIS — Z71.89 OTHER SPECIFIED COUNSELING: ICD-10-CM

## 2018-10-02 LAB
ALBUMIN SERPL ELPH-MCNC: 2.9 G/DL — LOW (ref 3.3–5.2)
ALP SERPL-CCNC: 75 U/L — SIGNIFICANT CHANGE UP (ref 40–120)
ALT FLD-CCNC: 16 U/L — SIGNIFICANT CHANGE UP
ANION GAP SERPL CALC-SCNC: 10 MMOL/L — SIGNIFICANT CHANGE UP (ref 5–17)
AST SERPL-CCNC: 14 U/L — SIGNIFICANT CHANGE UP
BASOPHILS # BLD AUTO: 0 K/UL — SIGNIFICANT CHANGE UP (ref 0–0.2)
BASOPHILS NFR BLD AUTO: 0.5 % — SIGNIFICANT CHANGE UP (ref 0–2)
BILIRUB SERPL-MCNC: 0.3 MG/DL — LOW (ref 0.4–2)
BUN SERPL-MCNC: 8 MG/DL — SIGNIFICANT CHANGE UP (ref 8–20)
CALCIUM SERPL-MCNC: 8.4 MG/DL — LOW (ref 8.6–10.2)
CHLORIDE SERPL-SCNC: 102 MMOL/L — SIGNIFICANT CHANGE UP (ref 98–107)
CO2 SERPL-SCNC: 28 MMOL/L — SIGNIFICANT CHANGE UP (ref 22–29)
CREAT SERPL-MCNC: 0.71 MG/DL — SIGNIFICANT CHANGE UP (ref 0.5–1.3)
CULTURE RESULTS: NO GROWTH — SIGNIFICANT CHANGE UP
EOSINOPHIL # BLD AUTO: 0.1 K/UL — SIGNIFICANT CHANGE UP (ref 0–0.5)
EOSINOPHIL NFR BLD AUTO: 1.6 % — SIGNIFICANT CHANGE UP (ref 0–5)
GLUCOSE BLDC GLUCOMTR-MCNC: 111 MG/DL — HIGH (ref 70–99)
GLUCOSE BLDC GLUCOMTR-MCNC: 131 MG/DL — HIGH (ref 70–99)
GLUCOSE BLDC GLUCOMTR-MCNC: 178 MG/DL — HIGH (ref 70–99)
GLUCOSE SERPL-MCNC: 126 MG/DL — HIGH (ref 70–115)
HCT VFR BLD CALC: 31.3 % — LOW (ref 42–52)
HGB BLD-MCNC: 9.4 G/DL — LOW (ref 14–18)
LEGIONELLA AG UR QL: NEGATIVE — SIGNIFICANT CHANGE UP
LYMPHOCYTES # BLD AUTO: 1 K/UL — SIGNIFICANT CHANGE UP (ref 1–4.8)
LYMPHOCYTES # BLD AUTO: 13 % — LOW (ref 20–55)
MCHC RBC-ENTMCNC: 26.9 PG — LOW (ref 27–31)
MCHC RBC-ENTMCNC: 30 G/DL — LOW (ref 32–36)
MCV RBC AUTO: 89.7 FL — SIGNIFICANT CHANGE UP (ref 80–94)
MONOCYTES # BLD AUTO: 0.5 K/UL — SIGNIFICANT CHANGE UP (ref 0–0.8)
MONOCYTES NFR BLD AUTO: 6.2 % — SIGNIFICANT CHANGE UP (ref 3–10)
NEUTROPHILS # BLD AUTO: 6 K/UL — SIGNIFICANT CHANGE UP (ref 1.8–8)
NEUTROPHILS NFR BLD AUTO: 78.4 % — HIGH (ref 37–73)
PLATELET # BLD AUTO: 405 K/UL — HIGH (ref 150–400)
POTASSIUM SERPL-MCNC: 3.3 MMOL/L — LOW (ref 3.5–5.3)
POTASSIUM SERPL-SCNC: 3.3 MMOL/L — LOW (ref 3.5–5.3)
PROT SERPL-MCNC: 7 G/DL — SIGNIFICANT CHANGE UP (ref 6.6–8.7)
RBC # BLD: 3.49 M/UL — LOW (ref 4.6–6.2)
RBC # FLD: 16.7 % — HIGH (ref 11–15.6)
SODIUM SERPL-SCNC: 140 MMOL/L — SIGNIFICANT CHANGE UP (ref 135–145)
SPECIMEN SOURCE: SIGNIFICANT CHANGE UP
WBC # BLD: 7.6 K/UL — SIGNIFICANT CHANGE UP (ref 4.8–10.8)
WBC # FLD AUTO: 7.6 K/UL — SIGNIFICANT CHANGE UP (ref 4.8–10.8)

## 2018-10-02 PROCEDURE — 99232 SBSQ HOSP IP/OBS MODERATE 35: CPT

## 2018-10-02 PROCEDURE — 99233 SBSQ HOSP IP/OBS HIGH 50: CPT | Mod: GC

## 2018-10-02 RX ORDER — POTASSIUM CHLORIDE 20 MEQ
40 PACKET (EA) ORAL ONCE
Qty: 0 | Refills: 0 | Status: COMPLETED | OUTPATIENT
Start: 2018-10-02 | End: 2018-10-02

## 2018-10-02 RX ORDER — POTASSIUM CHLORIDE 20 MEQ
10 PACKET (EA) ORAL ONCE
Qty: 0 | Refills: 0 | Status: COMPLETED | OUTPATIENT
Start: 2018-10-02 | End: 2018-10-02

## 2018-10-02 RX ADMIN — SIMETHICONE 80 MILLIGRAM(S): 80 TABLET, CHEWABLE ORAL at 05:36

## 2018-10-02 RX ADMIN — Medication 650 MILLIGRAM(S): at 10:20

## 2018-10-02 RX ADMIN — HEPARIN SODIUM 5000 UNIT(S): 5000 INJECTION INTRAVENOUS; SUBCUTANEOUS at 05:36

## 2018-10-02 RX ADMIN — Medication 1 MILLIGRAM(S): at 18:29

## 2018-10-02 RX ADMIN — Medication 100 MILLIEQUIVALENT(S): at 15:32

## 2018-10-02 RX ADMIN — HEPARIN SODIUM 5000 UNIT(S): 5000 INJECTION INTRAVENOUS; SUBCUTANEOUS at 18:29

## 2018-10-02 RX ADMIN — SIMETHICONE 80 MILLIGRAM(S): 80 TABLET, CHEWABLE ORAL at 15:32

## 2018-10-02 RX ADMIN — Medication 1 MILLIGRAM(S): at 12:48

## 2018-10-02 RX ADMIN — HALOPERIDOL DECANOATE 2 MILLIGRAM(S): 100 INJECTION INTRAMUSCULAR at 15:34

## 2018-10-02 RX ADMIN — PANTOPRAZOLE SODIUM 40 MILLIGRAM(S): 20 TABLET, DELAYED RELEASE ORAL at 05:36

## 2018-10-02 RX ADMIN — Medication 325 MILLIGRAM(S): at 12:49

## 2018-10-02 RX ADMIN — Medication 250 MILLIGRAM(S): at 05:36

## 2018-10-02 RX ADMIN — Medication 2: at 12:49

## 2018-10-02 RX ADMIN — Medication 650 MILLIGRAM(S): at 09:36

## 2018-10-02 NOTE — PROGRESS NOTE ADULT - SUBJECTIVE AND OBJECTIVE BOX
[facility]  INFECTIOUS DISEASES AND INTERNAL MEDICINE at [place]  =======================================================  José Luis Walton MD  Diplomates American Board of Internal Medicine and Infectious Diseases  =======================================================    YAMIL OLEA [id number]  chief complaint: readmitted after discharge for fevers.   patient seen and examined in follow up.  Chart and labs reviewed.     repeat cultures in process  no fevers since coming up from the ER  no new events    =======================================================  Allergies:  No Known Allergies      =======================================================  Antibiotics:  cefTAZidime/avibactam IVPB      cefTAZidime/avibactam IVPB 2.5 Gram(s) IV Intermittent every 8 hours        =======================================================     REVIEW OF SYSTEMS:  as above  all other ROS are negative    =======================================================    Physical Exam:  T(F): 98.4 (02 Oct 2018 09:14), Max: 98.7 (01 Oct 2018 17:13)  HR: 65 (02 Oct 2018 09:14)  BP: 152/72 (02 Oct 2018 09:14)  BP(mean): --  RR: 19 (02 Oct 2018 09:14)  SpO2: 98% (01 Oct 2018 23:46) (93% - 98%)    GEN: NAD  HEENT: normocephalic and atraumatic. EOMI. PERRL.    NECK: Supple.   LUNGS: Clear to auscultation.  HEART: Regular rate and rhythm   ABDOMEN: Soft, nontender, and nondistended.  Positive bowel sounds.    : No CVA tenderness  EXTREMITIES: Left leg in immobilizer   MSK: no joint swelling  NEUROLOGIC: Awake, minimal verbalization    PSYCHIATRIC: Unable to assess   SKIN: No Rash    =======================================================   Labs:                        9.4    7.6   )-----------( 405      ( 02 Oct 2018 10:29 )             31.3            Culture - Urine (collected 09-28-18 @ 21:43)  Source: .Urine Catheterized  Final Report (09-29-18 @ 17:02):    No growth    Culture - Blood (collected 09-28-18 @ 19:39)  Source: .Blood Blood-Peripheral    Culture - Blood (collected 09-28-18 @ 19:38)  Source: .Blood Blood-Peripheral    Culture - Sensi (Special) (collected 09-20-18 @ 18:15)  Source: .Other    Culture - Blood (collected 09-20-18 @ 08:00)  Source: .Blood Blood  Final Report (09-25-18 @ 09:01):    No growth at 5 days.    Culture - Blood (collected 09-20-18 @ 07:59)  Source: .Blood Blood  Final Report (09-25-18 @ 09:01):    No growth at 5 days.    Culture - Blood (collected 09-18-18 @ 17:26)  Source: .Blood Blood-Venous  Final Report (09-23-18 @ 19:01):    No growth at 5 days.    Culture - Blood (collected 09-18-18 @ 17:26)  Source: .Blood Blood-Peripheral  Final Report (09-23-18 @ 19:00):    No growth at 5 days.

## 2018-10-02 NOTE — PHYSICAL THERAPY INITIAL EVALUATION ADULT - IMPAIRMENTS CONTRIBUTING TO GAIT DEVIATIONS, PT EVAL
impaired motor control/decreased ROM/cognition/impaired coordination/impaired postural control/impaired balance

## 2018-10-02 NOTE — PROGRESS NOTE BEHAVIORAL HEALTH - SUMMARY
Patient is a 68  year old, male; domiciled with family; h/o intellectual disability, unclear if other psychiatric history; with PMxh DM, hyperlipidemia, anemia  was discharged on 9/27/18 from [facility] after being diagnosed with Klebsiella pneumoniae sepsis and was sent home with PICC line and AVYCAZ till 10/1/18 was brought back to ER by family as he was somewhat more agitated than his baseline. When pt. arrived to ER he had fever of 101.6,  currently being treated with IV abx.  Asked to evaluate patient's agitation.  Patient reportedly was more agitated the last couple of nights. Patient is currently calm, as per niece she feels patient's mental status is baseline.  He is rose, with low frustration tolerance and wants to go home.  She feels he may not have full understanding that he can't walk but did not find him to be more confused than his baseline.  Patient has limited coping skills.  Currently diagnosed with intellectual disability, Adjustment disorder with disturbance in mood and conduct r/o Delirium secondary to bactermia. No S/H I/I/P, no psychosis, no aggression elicited. Patient developed increase in anxiety today after hematologist was discussing possibility of cancer.

## 2018-10-02 NOTE — PHYSICAL THERAPY INITIAL EVALUATION ADULT - CRITERIA FOR SKILLED THERAPEUTIC INTERVENTIONS
impairments found/anticipated equipment needs at discharge/anticipated discharge recommendation/Home with 24/7 supervision/assist, slide board transfers, Home PT, pt will need RW for home PT, w/c and commode

## 2018-10-02 NOTE — PHYSICAL THERAPY INITIAL EVALUATION ADULT - IMPAIRMENTS FOUND, PT EVAL
gait, locomotion, and balance/muscle strength/poor safety awareness/aerobic capacity/endurance/arousal, attention, and cognition

## 2018-10-02 NOTE — PROGRESS NOTE ADULT - ASSESSMENT
67 y/o M with h/o MR, HTN, DM Type 2, dyslipidemia, recent Left tibial plateau fracture. had been admitted for Sepsis , BOUCHRA secondary to ATN found to have UTI and Bacteremia with carbapenem resistant klebsiella.  Sent home on Avycaz, now back for fever    - continue Avycaz;; initial end date was 10/1/18; but new fevers  - workup and cultures negative to date  - anticipate that course will extend for 5 more days is cultures negative - presumed end date is 10/6/18    - follow up all outstanding cultures    - if febrile will need to ADD vancomycin.

## 2018-10-02 NOTE — PROGRESS NOTE ADULT - SUBJECTIVE AND OBJECTIVE BOX
YAMIL OLEA    [id number]    68y      Male    CC: Fever    Interval/HPI;   As per RN patient was asking that Is he gonna die. he gets upset and anxious when he is alone and no body visit him.  When patient was seen and examined at bedside. Was c/o epigastric pain. Denied any N/V. no bowel movement yesterday.    REVIEW OF SYSTEMS:    Patient denied fever, chills, nausea, vomiting, cough, shortness of breath, chest pain or palpitations    Vital Signs Last 24 Hrs  T(C): 36.9 (02 Oct 2018 09:14), Max: 37.1 (01 Oct 2018 17:13)  T(F): 98.4 (02 Oct 2018 09:14), Max: 98.7 (01 Oct 2018 17:13)  HR: 65 (02 Oct 2018 09:14) (65 - 80)  BP: 152/72 (02 Oct 2018 09:14) (152/72 - 160/73)  BP(mean): --  RR: 19 (02 Oct 2018 09:14) (18 - 19)  SpO2: 98% (01 Oct 2018 23:46) (93% - 98%)    PHYSICAL EXAM:  GENERAL: NAD, well-groomed, obese  HEENT: PERRL, +EOMI, anicteric  NECK: Supple, No JVD   CHEST/LUNG: CTA bilaterally; Normal effort  HEART: S1S2 Normal intensity, no murmurs, gallops or rubs noted  ABDOMEN: Soft, BS Normoactive, NT, ND, no HSM noted  EXTREMITIES:  2+ radial and DP pulses noted, no clubbing, cyanosis, or edema noted, FROM x 4  SKIN: No rashes or lesions noted  NEURO: A&Ox1, no focal deficits noted, CN II-XII intact  PSYCH: normal mood and affect; insight/judgement appropriate        LABS:                        9.4    7.6   )-----------( 405      ( 02 Oct 2018 10:29 )             31.3     10-02    140  |  102  |  8.0  ----------------------------<  126<H>  3.3<L>   |  28.0  |  0.71    Ca    8.4<L>      02 Oct 2018 10:29    TPro  7.0  /  Alb  2.9<L>  /  TBili  0.3<L>  /  DBili  x   /  AST  14  /  ALT  16  /  AlkPhos  75  10-02      Urinalysis Basic - ( 01 Oct 2018 16:36 )    Color: Yellow / Appearance: Clear / S.015 / pH: x  Gluc: x / Ketone: Negative  / Bili: Negative / Urobili: Negative mg/dL   Blood: x / Protein: 500 mg/dL / Nitrite: Negative   Leuk Esterase: Trace / RBC: 0-2 /HPF / WBC 3-5   Sq Epi: x / Non Sq Epi: Occasional / Bacteria: Occasional          MEDICATIONS  (STANDING):  aspirin enteric coated 325 milliGRAM(s) Oral daily  atorvastatin 20 milliGRAM(s) Oral at bedtime  cefTAZidime/avibactam IVPB      cefTAZidime/avibactam IVPB 2.5 Gram(s) IV Intermittent every 8 hours  dextrose 5%. 1000 milliLiter(s) (50 mL/Hr) IV Continuous <Continuous>  dextrose 50% Injectable 12.5 Gram(s) IV Push once  dextrose 50% Injectable 25 Gram(s) IV Push once  dextrose 50% Injectable 25 Gram(s) IV Push once  folic acid 1 milliGRAM(s) Oral daily  heparin  Injectable 5000 Unit(s) SubCutaneous every 12 hours  insulin lispro (HumaLOG) corrective regimen sliding scale   SubCutaneous three times a day before meals  insulin lispro (HumaLOG) corrective regimen sliding scale   SubCutaneous at bedtime  pantoprazole    Tablet 40 milliGRAM(s) Oral before breakfast  saccharomyces boulardii 250 milliGRAM(s) Oral two times a day  simethicone 80 milliGRAM(s) Chew four times a day    MEDICATIONS  (PRN):  acetaminophen   Tablet .. 650 milliGRAM(s) Oral every 6 hours PRN Temp greater or equal to 38C (100.4F)  dextrose 40% Gel 15 Gram(s) Oral once PRN Blood Glucose LESS THAN 70 milliGRAM(s)/deciliter  glucagon  Injectable 1 milliGRAM(s) IntraMuscular once PRN Glucose LESS THAN 70 milligrams/deciliter  haloperidol    Injectable 2 milliGRAM(s) IntraMuscular every 6 hours PRN Aggitation      RADIOLOGY & ADDITIONAL TESTS:

## 2018-10-02 NOTE — PROGRESS NOTE ADULT - ASSESSMENT
Assessment and Plan:     68M with a recent admission for a tibial plateau fracture and sepsis due to Klebsiella UTI/bacteremia presented with agitation and fever. On presentation, the patient was noted to be febrile(101.6), WBC(23.9). Urinalysis and respiratory panel were negative. CT of the head was without acute intracranial pathology. CT of the chest, abdomen, and pelvis noted lytic and sclerotic lesions in the pelvis but no acute pathology. The patient was continued on intravenous antibiotics which were started on the prior admission and was seen by Infectious Disease in consultation. Repeat laboratory studies the following day noted resolution of the leukocytosis. The patient developed agitation overnight 9/29 and the Trudy brace was damaged. Pt continues to become agitated at night past two nights and took out his PICC line last night. Needs evaluation by psych and hematology for lytic lesion and proteinuria on UA.     Problem/Plan - 1:  ·  Problem: Carbapenem-resistant Klebsiella pneumoniae infection.   - continue Avycaz;; initial end date was 10/1/18; but new fevers  - workup and cultures negative to date  - anticipate that course will extend for 5 more days is cultures negative - presumed end date is 10/6/18  - follow up all outstanding cultures  - if febrile will need to ADD vancomycin.       Problem/Plan - 3:  ·  Problem: BOUCHRA (acute kidney injury).  Plan: Resolved w IVF.     Hypokalemia: 4  Klor and KCL added       Problem/Plan - 5:  ·  Problem: Tibial plateau fracture.  Plan: Ortho recalled as pt damaged his Trudy brace while agitated.   Ortho ordered new brace from orthotist.      Problem/Plan - 6:  ·  Problem: Agitation.  Plan: Episodes of agitation overnight resulting in the Trudy brace being damaged. In separate incident pt pulled PICC line. Pt w history MR, unclear as to baseline. Cont Haldol as needed. Cont constant obs for safety. Psychiatric reconsulted for concern of depression.     Problem/Plan - 7:  Problem: Diabetes mellitus of other type with complication. Plan: SS Insulin coverage, close monitoring of blood glucose levels.     Problem/Plan - 8:  ·  Problem: Lytic bone lesion of hip.  Plan: Mult foci of increased uptake on recent bone scan and proteinuria for concern of plasma cell dyscaria  Hematology evaluation Dr Peters consulted  will send spep, upep     Problem/Plan - 9:  ·  Problem: Prophylactic measure.  Plan: Cont subcut Heparin. Assessment and Plan:     68M with a recent admission for a tibial plateau fracture and sepsis due to Klebsiella UTI/bacteremia presented with agitation and fever. On presentation, the patient was noted to be febrile(101.6), WBC(23.9). Urinalysis and respiratory panel were negative. CT of the head was without acute intracranial pathology. CT of the chest, abdomen, and pelvis noted lytic and sclerotic lesions in the pelvis but no acute pathology. The patient was continued on intravenous antibiotics which were started on the prior admission and was seen by Infectious Disease in consultation. Repeat laboratory studies the following day noted resolution of the leukocytosis. The patient developed agitation overnight 9/29 and the Trudy brace was damaged. Pt continues to become agitated at night past two nights and took out his PICC line last night. Needs evaluation by psych and hematology for lytic lesion and proteinuria on UA and PT evaluation     Problem/Plan - 1:  ·  Problem: Carbapenem-resistant Klebsiella pneumoniae infection.   - continue Avycaz;; initial end date was 10/1/18; but new fevers  - workup and cultures negative to date  - anticipate that course will extend for 5 more days is cultures negative - presumed end date is 10/6/18  - follow up all outstanding cultures  - if febrile will need to ADD vancomycin.       Problem/Plan - 3:  ·  Problem: BOUCHRA (acute kidney injury).  Plan: Resolved w IVF.     Hypokalemia: 4  Klor and KCL added       Problem/Plan - 5:  ·  Problem: Tibial plateau fracture.  Plan: Ortho recalled as pt damaged his [place] brace while agitated.   Ortho ordered new brace from orthotist.      Problem/Plan - 6:  ·  Problem: Agitation.  Plan: Episodes of agitation overnight resulting in the [place] brace being damaged. In separate incident pt pulled PICC line. Pt w history MR, unclear as to baseline. Cont Haldol as needed. Cont constant obs for safety. Psychiatric reconsulted for concern of depression.     Problem/Plan - 7:  Problem: Diabetes mellitus of other type with complication. Plan: SS Insulin coverage, close monitoring of blood glucose levels.     Problem/Plan - 8:  ·  Problem: Lytic bone lesion of hip.  Plan: Mult foci of increased uptake on recent bone scan and proteinuria for concern of plasma cell dyscaria  Hematology evaluation Dr Peters consulted  will send spep, upep     Problem/Plan - 9:  ·  Problem: Prophylactic measure.  Plan: Cont subcut Heparin.

## 2018-10-02 NOTE — PHYSICAL THERAPY INITIAL EVALUATION ADULT - RANGE OF MOTION EXAMINATION, REHAB EVAL
bilateral upper extremity ROM was WFL (within functional limits)/Right LE ROM was WFL (within functional limits)/LLE in [place] brace

## 2018-10-02 NOTE — PROGRESS NOTE ADULT - SUBJECTIVE AND OBJECTIVE BOX
PA - Note    New trudy brace applied.  Patient laying comfortably in bed.  Pain controlled.  No changes to exam.  Patient is to remain NWB to his Left Lower Extremity and continue to use Trudy brace while ambulating.  Patient should follow up with Dr. Petty as outpatient when discharged from the hospital.

## 2018-10-02 NOTE — PHYSICAL THERAPY INITIAL EVALUATION ADULT - ADDITIONAL COMMENTS
Previously pt. to be d/c' to his Niece's home where he had family support at all times. No steps to navigate. Pt was independent prior to Left LE fx with [facility]. Was sent to [facility] where he had difficulty participating 2*2 MR. Pt was d/c' home doing slide board transfers with family assist, owns W/C.

## 2018-10-02 NOTE — PHYSICAL THERAPY INITIAL EVALUATION ADULT - TRANSFER SAFETY CONCERNS NOTED: SIT/STAND, REHAB EVAL
weight bearing restrictions to the left LE / unable to maintain/decreased safety awareness/decreased sequencing ability

## 2018-10-02 NOTE — CONSULT NOTE ADULT - SUBJECTIVE AND OBJECTIVE BOX
[facility]  MD Ratna Moses MD  [phone]  Answering Sevice : [phone]        YAMIL [id number]      HPI:  67 y/o male with DM, hyperlipidemia, anemia and  MR as per history was discharged on 18 from [facility] after being diagnosed with Klebsiella pneumoniae sepsis and was sent home with PICC line and AVYCAZ till 10/1/18 was brought back to ER by family as he was somewhat more agitated than his baseline. When pt. arrived to ER he had fever of 101.6, code sepsis was called, pt's wbc on this vist were 23.9 and were within normal range prior to discharge on 18. As per history there is no cough or  n/v/d. pt. does not contribute to history. pt's ct chest abdomen and pelvis was done in ER and did not show any focus of infection. ct head did not show acute findings as well. no other complaints at the time of admission. pt. got ativan in the ER and is resting comfortably.  As per record pt. was seen by ortho service on  for left tibial plateau fracture and brace and NWB LLE was recommended and out- pt. ortho follow up. (29 Sep 2018 01:11)      PAST MEDICAL & SURGICAL HISTORY:  Anemia, unspecified type  Hyperlipidemia, unspecified hyperlipidemia type  Diabetes mellitus of other type with complication  Hypertension, unspecified type  No significant past surgical history      ANTIBIOTICS  cefTAZidime/avibactam IVPB      cefTAZidime/avibactam IVPB 2.5 Gram(s) IV Intermittent every 8 hours      Allergies    No Known Allergies    Intolerances        SOCIAL HISTORY:    FAMILY HISTORY:  No pertinent family history in first degree relatives      Vital Signs Last 24 Hrs  T(C): 36.9 (02 Oct 2018 09:14), Max: 37.1 (01 Oct 2018 17:13)  T(F): 98.4 (02 Oct 2018 09:14), Max: 98.7 (01 Oct 2018 17:13)  HR: 65 (02 Oct 2018 09:14) (65 - 80)  BP: 152/72 (02 Oct 2018 09:14) (152/72 - 160/73)  BP(mean): --  RR: 19 (02 Oct 2018 09:14) (18 - 19)  SpO2: 98% (01 Oct 2018 23:46) (93% - 98%)  Drug Dosing Weight  Height (cm): 172.72 (28 Sep 2018 19:05)  Weight (kg): 103.9 (28 Sep 2018 19:05)  BMI (kg/m2): 34.8 (28 Sep 2018 19:05)  BSA (m2): 2.17 (28 Sep 2018 19:05)      REVIEW OF SYSTEMS:    CONSTITUTIONAL:  As per HPI.    HEENT:  Eyes:  No diplopia or blurred vision. ENT:  No earache, sore throat or runny nose.    CARDIOVASCULAR:  No pressure, squeezing, strangling, tightness, heaviness or aching about the chest, neck, axilla or epigastrium.    RESPIRATORY:  No cough, shortness of breath, PND or orthopnea.    GASTROINTESTINAL:  No nausea, vomiting or diarrhea.    GENITOURINARY:  No dysuria, frequency or urgency.    MUSCULOSKELETAL:  As per HPI.    SKIN:  No change in skin, hair or nails.    NEUROLOGIC:  No paresthesias, fasciculations, seizures or weakness.    PSYCHIATRIC:  No disorder of thought or mood.    ENDOCRINE:  No heat or cold intolerance, polyuria or polydipsia.    HEMATOLOGICAL:  No easy bruising or bleeding.           PHYSICAL EXAMINATION:    GENERAL: The patient is a well-developed, well-nourished _____in no apparent distress. ___ is alert and oriented x3.    VITAL SIGNS:     HEENT: Head is normocephalic and atraumatic. Extraocular muscles are intact. Pupils are equal, round, and reactive to light and accommodation. Nares appeared normal. Mouth is well hydrated and without lesions. Mucous membranes are moist. Posterior pharynx clear of any exudate or lesions.    NECK: Supple. No carotid bruits.  No lymphadenopathy or thyromegaly.    LUNGS: Clear to auscultation.    HEART: Regular rate and rhythm without murmur.    ABDOMEN: Soft, nontender, and nondistended.  Positive bowel sounds.  No hepatosplenomegaly was noted.    EXTREMITIES: Without any cyanosis, clubbing, rash, lesions or edema.    NEUROLOGIC: Cranial nerves II through XII are grossly intact.    PSYCHIATRIC: Flat affect, but denies suicidal or homicidal ideations.  SKIN: No ulceration or induration present.    MICROBIOLOGY:      LABS:                        9.4    7.6   )-----------( 405      ( 02 Oct 2018 10:29 )             31.3     10-02    140  |  102  |  8.0  ----------------------------<  126<H>  3.3<L>   |  28.0  |  0.71    Ca    8.4<L>      02 Oct 2018 10:29    TPro  7.0  /  Alb  2.9<L>  /  TBili  0.3<L>  /  DBili  x   /  AST  14  /  ALT  16  /  AlkPhos  75  10-02      Urinalysis Basic - ( 01 Oct 2018 16:36 )    Color: Yellow / Appearance: Clear / S.015 / pH: x  Gluc: x / Ketone: Negative  / Bili: Negative / Urobili: Negative mg/dL   Blood: x / Protein: 500 mg/dL / Nitrite: Negative   Leuk Esterase: Trace / RBC: 0-2 /HPF / WBC 3-5   Sq Epi: x / Non Sq Epi: Occasional / Bacteria: Occasional    ASSESSMENT:  lytic/sclerotic lesion in bone scan  Anemia chronic dss.        PLAN:  Discussed findings at [facility] with family with a member acting as . plan to start work-up to evaluate for possible plasma cell dyscrasia as well as other potential malignancies. the patient was mamie advised it is possible he may require a biopsy. Explained considerations and possibilities and answered numerous questions at length This visit entailed 1 hour, >50% spent on counselling and answering questions from the family      Adolfo Douglas M.D.

## 2018-10-03 LAB
CULTURE RESULTS: SIGNIFICANT CHANGE UP
CULTURE RESULTS: SIGNIFICANT CHANGE UP
FERRITIN SERPL-MCNC: 115 NG/ML — SIGNIFICANT CHANGE UP (ref 30–400)
GLUCOSE BLDC GLUCOMTR-MCNC: 116 MG/DL — HIGH (ref 70–99)
GLUCOSE BLDC GLUCOMTR-MCNC: 130 MG/DL — HIGH (ref 70–99)
GLUCOSE BLDC GLUCOMTR-MCNC: 150 MG/DL — HIGH (ref 70–99)
GLUCOSE BLDC GLUCOMTR-MCNC: 196 MG/DL — HIGH (ref 70–99)
IRON SATN MFR SERPL: 19 % — SIGNIFICANT CHANGE UP (ref 16–55)
IRON SATN MFR SERPL: 38 UG/DL — LOW (ref 59–158)
S PYO AG SPEC QL IA: NEGATIVE — SIGNIFICANT CHANGE UP
SPECIMEN SOURCE: SIGNIFICANT CHANGE UP
SPECIMEN SOURCE: SIGNIFICANT CHANGE UP
TIBC SERPL-MCNC: 204 UG/DL — LOW (ref 220–430)
TRANSFERRIN SERPL-MCNC: 143 MG/DL — LOW (ref 180–329)
VIT B12 SERPL-MCNC: 182 PG/ML — LOW (ref 232–1245)

## 2018-10-03 PROCEDURE — 99232 SBSQ HOSP IP/OBS MODERATE 35: CPT

## 2018-10-03 PROCEDURE — 71045 X-RAY EXAM CHEST 1 VIEW: CPT | Mod: 26

## 2018-10-03 PROCEDURE — 99233 SBSQ HOSP IP/OBS HIGH 50: CPT | Mod: GC

## 2018-10-03 RX ORDER — DIPHENHYDRAMINE HYDROCHLORIDE AND LIDOCAINE HYDROCHLORIDE AND ALUMINUM HYDROXIDE AND MAGNESIUM HYDRO
10 KIT THREE TIMES A DAY
Qty: 0 | Refills: 0 | Status: COMPLETED | OUTPATIENT
Start: 2018-10-03 | End: 2018-10-05

## 2018-10-03 RX ORDER — SIMETHICONE 80 MG/1
80 TABLET, CHEWABLE ORAL
Qty: 0 | Refills: 0 | Status: DISCONTINUED | OUTPATIENT
Start: 2018-10-03 | End: 2018-10-17

## 2018-10-03 RX ORDER — PREGABALIN 225 MG/1
1000 CAPSULE ORAL ONCE
Qty: 0 | Refills: 0 | Status: COMPLETED | OUTPATIENT
Start: 2018-10-03 | End: 2018-10-03

## 2018-10-03 RX ORDER — NYSTATIN 500MM UNIT
500000 POWDER (EA) MISCELLANEOUS
Qty: 0 | Refills: 0 | Status: COMPLETED | OUTPATIENT
Start: 2018-10-03 | End: 2018-10-10

## 2018-10-03 RX ORDER — LANOLIN ALCOHOL/MO/W.PET/CERES
3 CREAM (GRAM) TOPICAL AT BEDTIME
Qty: 0 | Refills: 0 | Status: DISCONTINUED | OUTPATIENT
Start: 2018-10-03 | End: 2018-10-17

## 2018-10-03 RX ADMIN — Medication 1 MILLIGRAM(S): at 12:37

## 2018-10-03 RX ADMIN — DIPHENHYDRAMINE HYDROCHLORIDE AND LIDOCAINE HYDROCHLORIDE AND ALUMINUM HYDROXIDE AND MAGNESIUM HYDRO 10 MILLILITER(S): KIT at 23:57

## 2018-10-03 RX ADMIN — SIMETHICONE 80 MILLIGRAM(S): 80 TABLET, CHEWABLE ORAL at 12:37

## 2018-10-03 RX ADMIN — SIMETHICONE 80 MILLIGRAM(S): 80 TABLET, CHEWABLE ORAL at 23:57

## 2018-10-03 RX ADMIN — HALOPERIDOL DECANOATE 2 MILLIGRAM(S): 100 INJECTION INTRAMUSCULAR at 01:21

## 2018-10-03 RX ADMIN — Medication 500000 UNIT(S): at 15:15

## 2018-10-03 RX ADMIN — Medication 0: at 23:56

## 2018-10-03 RX ADMIN — Medication 500000 UNIT(S): at 23:58

## 2018-10-03 RX ADMIN — Medication 1 MILLIGRAM(S): at 00:30

## 2018-10-03 RX ADMIN — Medication 325 MILLIGRAM(S): at 12:38

## 2018-10-03 RX ADMIN — HEPARIN SODIUM 5000 UNIT(S): 5000 INJECTION INTRAVENOUS; SUBCUTANEOUS at 05:42

## 2018-10-03 RX ADMIN — Medication 500000 UNIT(S): at 17:59

## 2018-10-03 RX ADMIN — DIPHENHYDRAMINE HYDROCHLORIDE AND LIDOCAINE HYDROCHLORIDE AND ALUMINUM HYDROXIDE AND MAGNESIUM HYDRO 10 MILLILITER(S): KIT at 15:16

## 2018-10-03 RX ADMIN — Medication 3 MILLIGRAM(S): at 23:56

## 2018-10-03 RX ADMIN — ATORVASTATIN CALCIUM 20 MILLIGRAM(S): 80 TABLET, FILM COATED ORAL at 23:56

## 2018-10-03 NOTE — PROGRESS NOTE ADULT - ASSESSMENT
Assessment and Plan:     68M with a recent admission for a tibial plateau fracture and sepsis due to Klebsiella UTI/bacteremia presented with agitation and fever. On presentation, the patient was noted to be febrile(101.6), WBC(23.9). Urinalysis and respiratory panel were negative. CT of the head was without acute intracranial pathology. CT of the chest, abdomen, and pelvis noted lytic and sclerotic lesions in the pelvis but no acute pathology. The patient was continued on intravenous antibiotics which were started on the prior admission and was seen by Infectious Disease in consultation. Repeat laboratory studies the following day noted resolution of the leukocytosis. The patient developed agitation overnight 9/29 and the Trudy brace was damaged. Pt continues to become agitated at night past two nights and took out his PICC line last night. Seen by psych for AMS and agitation, placed on haldol PRN. Given proteinuria and lytic lesions found on CT scan the patient was seen by hematology/oncolocy for plasma cell dyscrasia work up. PT recommends home with home PT.      Problem/Plan - 1:  ·  Problem: Carbapenem-resistant Klebsiella pneumoniae infection.   - continue Avycaz;;end date 10/6/18  - workup and cultures negative to date    Problem/Plan - 3:  ·  Problem: BOUCHRA (acute kidney injury).  Plan: Resolved w IVF.     Hypokalemia: 4  Klor and KCL added       Problem/Plan - 5:  ·  Problem: Tibial plateau fracture.  Plan: Ortho recalled as pt damaged his [place] brace while agitated.   Ortho ordered new brace from orthotist.      Problem/Plan - 6:  ·  Problem: Agitation.  Plan: Episodes of agitation overnight resulting in the [place] brace being damaged. In separate incident pt pulled PICC line. Pt w history MR, unclear as to baseline. Cont Haldol and ativan as needed.      Problem/Plan - 7:  Problem: Diabetes mellitus of other type with complication. Plan: SS Insulin coverage, close monitoring of blood glucose levels.     Problem/Plan - 8:  ·  Problem: Lytic bone lesion of hip.  Plan: Mult foci of increased uptake on recent bone scan and proteinuria for concern of plasma cell dyscaria  Hematology evaluation Dr Peters consulted and saw the patient   will send spep, upep     Problem/Plan - 9:  ·  Problem: Prophylactic measure.  Plan: Cont subcut Heparin.     Problem 10:  Low B12  -Will give 1000mcg IM x1 now, will need repeat level in few weeks and monthly injections     Dispo: Home with home PT, will need hematology/oncology follow up

## 2018-10-03 NOTE — PROGRESS NOTE BEHAVIORAL HEALTH - SUMMARY
Patient is a 68  year old, male; domiciled with family; h/o intellectual disability, unclear if other psychiatric history; with PMxh DM, hyperlipidemia, anemia  was discharged on 9/27/18 from [facility] after being diagnosed with Klebsiella pneumoniae sepsis and was sent home with PICC line and AVYCAZ till 10/1/18 was brought back to ER by family as he was somewhat more agitated than his baseline. When pt. arrived to ER he had fever of 101.6,  currently being treated with IV abx.  Asked to evaluate patient's agitation.  Patient reportedly was more agitated the last couple of nights. Patient is currently calm, as per niece she feels patient's mental status is baseline.  He is rose, with low frustration tolerance and wants to go home.  She feels he may not have full understanding that he can't walk but did not find him to be more confused than his baseline.  Patient has limited coping skills.  Currently diagnosed with intellectual disability, Adjustment disorder with disturbance in mood and conduct r/o Delirium secondary to bactermia. No S/H I/I/P, no psychosis, no aggression elicited. Patient developed increase in anxiety today after hematologist was discussing possibility of cancer.

## 2018-10-03 NOTE — PROGRESS NOTE BEHAVIORAL HEALTH - NSBHCHARTREVIEWLAB_PSY_A_CORE FT
9.2    11.8  )-----------( 399      ( 30 Sep 2018 08:50 )             29.2     -    141  |  105  |  11.0  ----------------------------<  92  3.7   |  24.0  |  0.84    Ca    8.6      30 Sep 2018 08:50          Urinalysis Basic - ( 01 Oct 2018 16:36 )    Color: Yellow / Appearance: Clear / S.015 / pH: x  Gluc: x / Ketone: Negative  / Bili: Negative / Urobili: Negative mg/dL   Blood: x / Protein: 500 mg/dL / Nitrite: Negative   Leuk Esterase: Trace / RBC: 0-2 /HPF / WBC 3-5   Sq Epi: x / Non Sq Epi: Occasional / Bacteria: Occasional
9.2    11.8  )-----------( 399      ( 30 Sep 2018 08:50 )             29.2     -    141  |  105  |  11.0  ----------------------------<  92  3.7   |  24.0  |  0.84    Ca    8.6      30 Sep 2018 08:50          Urinalysis Basic - ( 01 Oct 2018 16:36 )    Color: Yellow / Appearance: Clear / S.015 / pH: x  Gluc: x / Ketone: Negative  / Bili: Negative / Urobili: Negative mg/dL   Blood: x / Protein: 500 mg/dL / Nitrite: Negative   Leuk Esterase: Trace / RBC: 0-2 /HPF / WBC 3-5   Sq Epi: x / Non Sq Epi: Occasional / Bacteria: Occasional

## 2018-10-03 NOTE — PROGRESS NOTE ADULT - SUBJECTIVE AND OBJECTIVE BOX
[facility]  INFECTIOUS DISEASES AND INTERNAL MEDICINE at [place]  =======================================================  José Luis Walton MD  Diplomates American Board of Internal Medicine and Infectious Diseases  =======================================================    YAMIL OLEA [id number]  chief complaint: readmitted after discharge for fevers.   patient seen and examined in follow up.  Chart and labs reviewed.     repeat cultures are negative  no fevers since coming up from the ER  no new events    =======================================================  Allergies:  No Known Allergies      =======================================================  Antibiotics:  cefTAZidime/avibactam IVPB      cefTAZidime/avibactam IVPB 2.5 Gram(s) IV Intermittent every 8 hours       =======================================================     REVIEW OF SYSTEMS:  as above  all other ROS are negative    =======================================================    Physical Exam:  T(F): 98.6 (03 Oct 2018 09:03), Max: 98.7 (01 Oct 2018 17:13)  HR: 76 (03 Oct 2018 09:03)  BP: 159/80 (03 Oct 2018 09:03)    RR: 20 (03 Oct 2018 09:03)  SpO2: 95% (03 Oct 2018 09:03) (93% - 98%)    GEN: NAD  HEENT: normocephalic and atraumatic. EOMI. PERRL.    NECK: Supple.   LUNGS: Clear to auscultation.  HEART: Regular rate and rhythm   ABDOMEN: Soft, nontender, and nondistended.  Positive bowel sounds.    : No CVA tenderness  EXTREMITIES: Left leg in immobilizer   MSK: no joint swelling  NEUROLOGIC: Awake, minimal verbalization    PSYCHIATRIC: Unable to assess   SKIN: No Rash    =======================================================  Labs:                        9.4    7.6   )-----------( 405      ( 02 Oct 2018 10:29 )             31.3     10-02    140  |  102  |  8.0  ----------------------------<  126<H>  3.3<L>   |  28.0  |  0.71    Ca    8.4<L>      02 Oct 2018 10:29    TPro  7.0  /  Alb  2.9<L>  /  TBili  0.3<L>  /  DBili  x   /  AST  14  /  ALT  16  /  AlkPhos  75  10-02        Culture - Urine (collected 10-01-18 @ 16:37)  Source: .Urine Clean Catch (Midstream)  Final Report (10-02-18 @ 15:32):    No growth    Culture - Urine (collected 09-28-18 @ 21:43)  Source: .Urine Catheterized  Final Report (09-29-18 @ 17:02):    No growth    Culture - Blood (collected 09-28-18 @ 19:39)  Source: .Blood Blood-Peripheral    Culture - Blood (collected 09-28-18 @ 19:38)  Source: .Blood Blood-Peripheral    Culture - Sensi (Special) (collected 09-20-18 @ 18:15)  Source: .Other    Culture - Blood (collected 09-20-18 @ 08:00)  Source: .Blood Blood  Final Report (09-25-18 @ 09:01):    No growth at 5 days.    Culture - Blood (collected 09-20-18 @ 07:59)  Source: .Blood Blood  Final Report (09-25-18 @ 09:01):    No growth at 5 days.

## 2018-10-03 NOTE — PROGRESS NOTE ADULT - ASSESSMENT
69 y/o M with h/o MR, HTN, DM Type 2, dyslipidemia, recent Left tibial plateau fracture. had been admitted for Sepsis , BOUCHRA secondary to ATN found to have UTI and Bacteremia with carbapenem resistant klebsiella.  Sent home on Avycaz, now back for fever    - continue Avycaz;  initial end date was 10/1/18; but new fevers  - workup and cultures negative to date  Current cultures negative   - antibiotics to END on 10/6/18    - observe 24 hours post antibiotics prior to community discharge      please call back should new concerns arise

## 2018-10-03 NOTE — PROGRESS NOTE ADULT - SUBJECTIVE AND OBJECTIVE BOX
YAMIL OLEA Patient is a 68y old  Male who presents with a chief complaint of sepsis (02 Oct 2018 12:46)     HPI:  69 y/o male with DM, hyperlipidemia, anemia and  MR as per history was discharged on 18 from [facility] after being diagnosed with Klebsiella pneumoniae sepsis and was sent home with PICC line and AVYCAZ till 10/1/18 was brought back to ER by family as he was somewhat more agitated than his baseline. When pt. arrived to ER he had fever of 101.6, code sepsis was called, pt's wbc on this vist were 23.9 and were within normal range prior to discharge on 18. As per history there is no cough or  n/v/d. pt. does not contribute to history. pt's ct chest abdomen and pelvis was done in ER and did not show any focus of infection. ct head did not show acute findings as well. no other complaints at the time of admission. pt. got ativan in the ER and is resting comfortably.  As per record pt. was seen by ortho service on  for left tibial plateau fracture and brace and NWB LLE was recommended and out- pt. ortho follow up. (29 Sep 2018 01:11)    HPI 10/3/18:    Patient seen and examined at the bedside. He appears in NAD, sitting up in bed. He was less agitated overnight. Off 1 to 1 today and on enhanced supervision. He complains of some shortness of breath, sore throat, and cough. He denies recent fevers, chills, nausea, vomiting, CP, abdominal pain, diarrhea.     I&O's Summary    03 Oct 2018 07:01  -  03 Oct 2018 14:35  --------------------------------------------------------  IN: 0 mL / OUT: 400 mL / NET: -400 mL      Allergies    No Known Allergies    Intolerances      HEALTH ISSUES - PROBLEM Dx:  Intellectual disability  Adjustment disorder with mixed disturbance of emotions and conduct  Acute febrile illness: Acute febrile illness  Prophylactic measure: Prophylactic measure  Lytic bone lesion of hip: Lytic bone lesion of hip  Diabetes mellitus of other type with complication: Diabetes mellitus of other type with complication  Agitation: Agitation  Tibial plateau fracture: Tibial plateau fracture  Bacteremia: Bacteremia  Sepsis: Sepsis  BOUCHRA (acute kidney injury): BOUCHRA (acute kidney injury)  Hyperlipidemia, unspecified hyperlipidemia type: Hyperlipidemia, unspecified hyperlipidemia type  Type 2 diabetes mellitus with hyperglycemia, without long-term current use of insulin: Type 2 diabetes mellitus with hyperglycemia, without long-term current use of insulin  Sepsis, due to unspecified organism: Sepsis, due to unspecified organism        PAST MEDICAL & SURGICAL HISTORY:  Anemia, unspecified type  Hyperlipidemia, unspecified hyperlipidemia type  Diabetes mellitus of other type with complication  Hypertension, unspecified type  No significant past surgical history          Vital Signs Last 24 Hrs  T(C): 37 (03 Oct 2018 09:03), Max: 37.1 (02 Oct 2018 15:45)  T(F): 98.6 (03 Oct 2018 09:03), Max: 98.7 (02 Oct 2018 15:45)  HR: 76 (03 Oct 2018 09:03) (76 - 99)  BP: 159/80 (03 Oct 2018 09:03) (142/74 - 159/80)  RR: 20 (03 Oct 2018 09:03) (20 - 20)  SpO2: 95% (03 Oct 2018 09:03) (95% - 96%)    PHYSICAL EXAM:    GENERAL: NAD  HEAD:  Atraumatic, Normocephalic  EYES: EOMI, PERRLA, conjunctiva and sclera clear  ENMT:  Moist mucous membranes,  No lesions  NECK: Supple, No JVD, Normal thyroid  NERVOUS SYSTEM:  Alert & Oriented X1,  Moves upper and lower extremities  CHEST/LUNG: Clear to auscultation bilaterally; No rales, rhonchi, wheezing,   HEART: Regular rate and rhythm; No murmurs,   ABDOMEN: Soft, Nontender, Nondistended; Bowel sounds present  EXTREMITIES:  +1 LE edema b/l   SKIN: No rashes or lesions    acetaminophen   Tablet .. 650 milliGRAM(s) Oral every 6 hours PRN  aluminum hydroxide/magnesium hydroxide/simethicone Suspension 30 milliLiter(s) Oral every 6 hours PRN  aspirin enteric coated 325 milliGRAM(s) Oral daily  atorvastatin 20 milliGRAM(s) Oral at bedtime  cefTAZidime/avibactam IVPB      cefTAZidime/avibactam IVPB 2.5 Gram(s) IV Intermittent every 8 hours  cyanocobalamin Injectable 1000 MICROGram(s) IntraMuscular once  dextrose 40% Gel 15 Gram(s) Oral once PRN  dextrose 5%. 1000 milliLiter(s) IV Continuous <Continuous>  dextrose 50% Injectable 12.5 Gram(s) IV Push once  dextrose 50% Injectable 25 Gram(s) IV Push once  dextrose 50% Injectable 25 Gram(s) IV Push once  FIRST- Mouthwash  BLM 10 milliLiter(s) Swish and Spit three times a day  folic acid 1 milliGRAM(s) Oral daily  glucagon  Injectable 1 milliGRAM(s) IntraMuscular once PRN  haloperidol    Injectable 2 milliGRAM(s) IntraMuscular every 6 hours PRN  heparin  Injectable 5000 Unit(s) SubCutaneous every 12 hours  insulin lispro (HumaLOG) corrective regimen sliding scale   SubCutaneous three times a day before meals  insulin lispro (HumaLOG) corrective regimen sliding scale   SubCutaneous at bedtime  LORazepam   Injectable 1 milliGRAM(s) IV Push every 6 hours PRN  nystatin    Suspension 907851 Unit(s) Oral four times a day  pantoprazole    Tablet 40 milliGRAM(s) Oral before breakfast  saccharomyces boulardii 250 milliGRAM(s) Oral two times a day  simethicone 80 milliGRAM(s) Chew four times a day      LABS:                          9.4    7.6   )-----------( 405      ( 02 Oct 2018 10:29 )             31.3     10-02    140  |  102  |  8.0  ----------------------------<  126<H>  3.3<L>   |  28.0  |  0.71    Ca    8.4<L>      02 Oct 2018 10:29    TPro  7.0  /  Alb  2.9<L>  /  TBili  0.3<L>  /  DBili  x   /  AST  14  /  ALT  16  /  AlkPhos  75  10-02    LIVER FUNCTIONS - ( 02 Oct 2018 10:29 )  Alb: 2.9 g/dL / Pro: 7.0 g/dL / ALK PHOS: 75 U/L / ALT: 16 U/L / AST: 14 U/L / GGT: x                 Urinalysis Basic - ( 01 Oct 2018 16:36 )    Color: Yellow / Appearance: Clear / S.015 / pH: x  Gluc: x / Ketone: Negative  / Bili: Negative / Urobili: Negative mg/dL   Blood: x / Protein: 500 mg/dL / Nitrite: Negative   Leuk Esterase: Trace / RBC: 0-2 /HPF / WBC 3-5   Sq Epi: x / Non Sq Epi: Occasional / Bacteria: Occasional      CAPILLARY BLOOD GLUCOSE      POCT Blood Glucose.: 116 mg/dL (03 Oct 2018 11:30)  POCT Blood Glucose.: 130 mg/dL (03 Oct 2018 08:28)  POCT Blood Glucose.: 111 mg/dL (02 Oct 2018 16:54)      RADIOLOGY & ADDITIONAL TESTS:      Consultant notes reviewed    Case discussed with consultant/provider/ family /patient

## 2018-10-03 NOTE — PROGRESS NOTE BEHAVIORAL HEALTH - NSBHFUPINTERVALHXFT_PSY_A_CORE
Patient was in room with niece sister.  He is calm.  He state he is no longer afraid. He states that he fell and broke his leg.  He asked his sister why god doesn't just give him a new leg.  Niece feels patient is currently at baseline.  She stated that she told patient that he did not have cancer and his anxiety went down. PRN of Ativan and Haldol given at 0:00 and 0:30 respectively.  No psychotic sx's, no S/H I/I/P.
Patient reported that he wanted to get up and that he wanted to go home. He reported that he is afraid to be here and wants to go home.  Patient admits to anxiety and palpitations.  He stated that he is afraid because niece is not here. He was reportedly fearful about engaging in PT.  He has not been aggressive, agitated, or engaging in any dangerous behavior. Patient was not anxious yesterday. Spoke to Niece who stated that when she came in the morning she found patient to be calm, quiet and asking when was going home. He became scared after hematologist came and spoke to family about work up for Cancer.  Patient made statement that he was scared to die.

## 2018-10-03 NOTE — PROGRESS NOTE BEHAVIORAL HEALTH - NSBHCONSULTMEDAGITATION_PSY_A_CORE FT
Haldol 2 mg pO/IM/IV q 6hrs PRN Agitation  Would add Ativan 1 mg PO/IM/IV Q 6hrs PRN anxiety Haldol 2 mg pO/IM/IV q 6hrs PRN Agitation Ativan 1 mg PO/IM/IV Q 6hrs PRN anxiety

## 2018-10-04 LAB
ALBUMIN SERPL ELPH-MCNC: 3 G/DL — LOW (ref 3.3–5.2)
ALP SERPL-CCNC: 85 U/L — SIGNIFICANT CHANGE UP (ref 40–120)
ALT FLD-CCNC: 19 U/L — SIGNIFICANT CHANGE UP
ANION GAP SERPL CALC-SCNC: 15 MMOL/L — SIGNIFICANT CHANGE UP (ref 5–17)
AST SERPL-CCNC: 21 U/L — SIGNIFICANT CHANGE UP
BILIRUB SERPL-MCNC: 0.4 MG/DL — SIGNIFICANT CHANGE UP (ref 0.4–2)
BUN SERPL-MCNC: 10 MG/DL — SIGNIFICANT CHANGE UP (ref 8–20)
CALCIUM SERPL-MCNC: 9.7 MG/DL — SIGNIFICANT CHANGE UP (ref 8.6–10.2)
CHLORIDE SERPL-SCNC: 100 MMOL/L — SIGNIFICANT CHANGE UP (ref 98–107)
CO2 SERPL-SCNC: 26 MMOL/L — SIGNIFICANT CHANGE UP (ref 22–29)
CREAT SERPL-MCNC: 0.82 MG/DL — SIGNIFICANT CHANGE UP (ref 0.5–1.3)
GLUCOSE BLDC GLUCOMTR-MCNC: 110 MG/DL — HIGH (ref 70–99)
GLUCOSE BLDC GLUCOMTR-MCNC: 112 MG/DL — HIGH (ref 70–99)
GLUCOSE BLDC GLUCOMTR-MCNC: 119 MG/DL — HIGH (ref 70–99)
GLUCOSE BLDC GLUCOMTR-MCNC: 128 MG/DL — HIGH (ref 70–99)
GLUCOSE BLDC GLUCOMTR-MCNC: 156 MG/DL — HIGH (ref 70–99)
GLUCOSE SERPL-MCNC: 116 MG/DL — HIGH (ref 70–115)
HCT VFR BLD CALC: 36.5 % — LOW (ref 42–52)
HGB BLD-MCNC: 11.1 G/DL — LOW (ref 14–18)
IGA FLD-MCNC: 399 MG/DL — SIGNIFICANT CHANGE UP (ref 84–499)
IGG FLD-MCNC: 1663 MG/DL — HIGH (ref 610–1660)
IGM SERPL-MCNC: 197 MG/DL — SIGNIFICANT CHANGE UP (ref 35–242)
KAPPA LC SER QL IFE: 6.52 MG/DL — HIGH (ref 0.33–1.94)
KAPPA/LAMBDA FREE LIGHT CHAIN RATIO, SERUM: 0.94 RATIO — SIGNIFICANT CHANGE UP (ref 0.26–1.65)
LAMBDA LC SER QL IFE: 6.96 MG/DL — HIGH (ref 0.57–2.63)
MCHC RBC-ENTMCNC: 27.5 PG — SIGNIFICANT CHANGE UP (ref 27–31)
MCHC RBC-ENTMCNC: 30.4 G/DL — LOW (ref 32–36)
MCV RBC AUTO: 90.6 FL — SIGNIFICANT CHANGE UP (ref 80–94)
PLATELET # BLD AUTO: 518 K/UL — HIGH (ref 150–400)
POTASSIUM SERPL-MCNC: 3.6 MMOL/L — SIGNIFICANT CHANGE UP (ref 3.5–5.3)
POTASSIUM SERPL-SCNC: 3.6 MMOL/L — SIGNIFICANT CHANGE UP (ref 3.5–5.3)
PROT SERPL-MCNC: 7.8 G/DL — SIGNIFICANT CHANGE UP (ref 6.6–8.7)
PSA FLD-MCNC: 7.94 NG/ML — HIGH (ref 0–4)
RBC # BLD: 4.03 M/UL — LOW (ref 4.6–6.2)
RBC # FLD: 16.9 % — HIGH (ref 11–15.6)
SODIUM SERPL-SCNC: 141 MMOL/L — SIGNIFICANT CHANGE UP (ref 135–145)
WBC # BLD: 10.5 K/UL — SIGNIFICANT CHANGE UP (ref 4.8–10.8)
WBC # FLD AUTO: 10.5 K/UL — SIGNIFICANT CHANGE UP (ref 4.8–10.8)

## 2018-10-04 PROCEDURE — 99232 SBSQ HOSP IP/OBS MODERATE 35: CPT | Mod: GC

## 2018-10-04 RX ADMIN — DIPHENHYDRAMINE HYDROCHLORIDE AND LIDOCAINE HYDROCHLORIDE AND ALUMINUM HYDROXIDE AND MAGNESIUM HYDRO 10 MILLILITER(S): KIT at 21:25

## 2018-10-04 RX ADMIN — Medication 3 MILLIGRAM(S): at 21:25

## 2018-10-04 RX ADMIN — HALOPERIDOL DECANOATE 2 MILLIGRAM(S): 100 INJECTION INTRAMUSCULAR at 05:21

## 2018-10-04 RX ADMIN — Medication 1 MILLIGRAM(S): at 11:51

## 2018-10-04 RX ADMIN — SIMETHICONE 80 MILLIGRAM(S): 80 TABLET, CHEWABLE ORAL at 18:02

## 2018-10-04 RX ADMIN — Medication 250 MILLIGRAM(S): at 18:01

## 2018-10-04 RX ADMIN — Medication 325 MILLIGRAM(S): at 11:51

## 2018-10-04 RX ADMIN — Medication 1 MILLIGRAM(S): at 01:07

## 2018-10-04 RX ADMIN — HEPARIN SODIUM 5000 UNIT(S): 5000 INJECTION INTRAVENOUS; SUBCUTANEOUS at 18:01

## 2018-10-04 RX ADMIN — ATORVASTATIN CALCIUM 20 MILLIGRAM(S): 80 TABLET, FILM COATED ORAL at 21:25

## 2018-10-04 RX ADMIN — SIMETHICONE 80 MILLIGRAM(S): 80 TABLET, CHEWABLE ORAL at 11:52

## 2018-10-04 RX ADMIN — Medication 1 MILLIGRAM(S): at 10:56

## 2018-10-04 NOTE — PROGRESS NOTE ADULT - SUBJECTIVE AND OBJECTIVE BOX
[facility]  MD Ratna Moses MD  [phone]  Answering Tracie : [phone]    CARLOS OLEA-[id number]    INTERVAL HPI/OVERNIGHT EVENTS:    Vital Signs Last 24 Hrs  T(C): 36.9 (04 Oct 2018 00:34), Max: 37.1 (03 Oct 2018 16:24)  T(F): 98.5 (04 Oct 2018 00:34), Max: 98.8 (03 Oct 2018 16:24)  HR: 87 (04 Oct 2018 08:44) (72 - 87)  BP: 156/90 (04 Oct 2018 08:44) (143/65 - 156/90)  BP(mean): --  RR: 19 (04 Oct 2018 08:44) (19 - 20)  SpO2: 97% (04 Oct 2018 00:34) (96% - 97%)  ANTIBIOTICS  cefTAZidime/avibactam IVPB      cefTAZidime/avibactam IVPB 2.5 Gram(s) IV Intermittent every 8 hours  nystatin    Suspension 049543 Unit(s) Oral four times a day      Allergies    No Known Allergies    Intolerances        REVIEW OF SYSTEMS:    CONSTITUTIONAL:  As per HPI.    HEENT:  Eyes:  No diplopia or blurred vision. ENT:  No earache, sore throat or runny nose.    CARDIOVASCULAR:  No pressure, squeezing, strangling, tightness, heaviness or aching about the chest, neck, axilla or epigastrium.    RESPIRATORY:  No cough, shortness of breath, PND or orthopnea.    GASTROINTESTINAL:  No nausea, vomiting or diarrhea.    GENITOURINARY:  No dysuria, frequency or urgency.    MUSCULOSKELETAL:  As per HPI.    SKIN:  No change in skin, hair or nails.    NEUROLOGIC:  CNI,MOTOR WNL, SENSORY WNL.    ENDOCRINE:  No heat or cold intolerance, polyuria or polydipsia.    HEMATOLOGICAL:  No easy bruising or bleeding.           PHYSICAL EXAMINATION:    GENERAL: The patient is a well-developed, well-nourished _____in no apparent distress. ___ is alert and oriented x3.    VITAL SIGNS:     HEENT: Head is normocephalic and atraumatic. Extraocular muscles are intact. Pupils are equal, round, and reactive to light and accommodation. Nares appeared normal. Mouth is well hydrated and without lesions. Mucous membranes are moist. Posterior pharynx clear of any exudate or lesions.    NECK: Supple. No carotid bruits.  No lymphadenopathy or thyromegaly.    LUNGS: Clear to auscultation.    HEART: Regular rate and rhythm without murmur.    ABDOMEN: Soft, nontender, and nondistended.  Positive bowel sounds.  No hepatosplenomegaly was noted.    EXTREMITIES: Without any cyanosis, clubbing, rash, lesions or edema.    NEUROLOGIC: Cranial nerves II through XII are grossly intact.    PSYCHIATRIC: Flat affect, but denies suicidal or homicidal ideations.  SKIN: No ulceration or induration present.      LABS:                        11.1   10.5  )-----------( 518      ( 04 Oct 2018 08:42 )             36.5     10-04    141  |  100  |  10.0  ----------------------------<  116<H>  3.6   |  26.0  |  0.82    Ca    9.7      04 Oct 2018 08:42    TPro  7.8  /  Alb  3.0<L>  /  TBili  0.4  /  DBili  x   /  AST  21  /  ALT  19  /  AlkPhos  85  10-04    ASSESSMENT:  UTI  Abnormal radiologic findings       PLAN:  await results of his work-up.  Will follow.      Adolfo Douglas M.D.

## 2018-10-04 NOTE — PROGRESS NOTE ADULT - ASSESSMENT
Assessment and Plan:   · Assessment		    Assessment and Plan:     68M with a recent admission for a tibial plateau fracture and sepsis due to Klebsiella UTI/bacteremia presented with agitation and fever. On presentation, the patient was noted to be febrile(101.6), WBC(23.9). Urinalysis and respiratory panel were negative. CT of the head was without acute intracranial pathology. CT of the chest, abdomen, and pelvis noted lytic and sclerotic lesions in the pelvis but no acute pathology. The patient was continued on intravenous antibiotics which were started on the prior admission and was seen by Infectious Disease in consultation. Repeat laboratory studies the following day noted resolution of the leukocytosis. The patient developed agitation overnight 9/29 and the [place] brace was damaged. Pt continues to become agitated at night past two nights and took out his PICC line last night. Seen by psych for AMS and agitation, placed on haldol PRN. Given proteinuria and lytic lesions found on CT scan the patient was seen by hematology/oncolocy for plasma cell dyscrasia work up. PT recommends home with home PT.      Problem/Plan - 1:  ·  Problem: Carbapenem-resistant Klebsiella pneumoniae infection.   - continue Avycaz;;end date 10/6/18  - workup and cultures negative to date    Problem/Plan - 3:  ·  Problem: BOUCHRA (acute kidney injury).  Plan: Resolved w IVF.     Hypokalemia: 4  Klor and KCL added       Problem/Plan - 5:  ·  Problem: Tibial plateau fracture.  Plan: Ortho recalled as pt damaged his [place] brace while agitated.   Ortho ordered new brace from orthotist.      Problem/Plan - 6:  ·  Problem: Agitation.  Plan: Episodes of agitation overnight resulting in the [place] brace being damaged. In separate incident pt pulled PICC line. Pt w history MR, unclear as to baseline. Cont Haldol and ativan as needed.      Problem/Plan - 7:  Problem: Diabetes mellitus of other type with complication. Plan: SS Insulin coverage, close monitoring of blood glucose levels.     Problem/Plan - 8:  ·  Problem: Lytic bone lesion of hip.  Plan: Mult foci of increased uptake on recent bone scan and proteinuria for concern of plasma cell dyscaria  Hematology evaluation Dr Peters consulted and saw the patient   will send spep, upep     Problem/Plan - 9:  ·  Problem: Prophylactic measure.  Plan: Cont subcut Heparin.     Problem 10:  Low B12  -Will give 1000mcg IM x1 now, will need repeat level in few weeks and monthly injections     Dispo: Home with home PT, will need hematology/oncology follow up Assessment and Plan:     68M with a recent admission for a tibial plateau fracture and sepsis due to Klebsiella UTI/bacteremia presented with agitation and fever. On presentation, the patient was noted to be febrile(101.6), WBC(23.9). Urinalysis and respiratory panel were negative. CT of the head was without acute intracranial pathology. CT of the chest, abdomen, and pelvis noted lytic and sclerotic lesions in the pelvis but no acute pathology. The patient was continued on intravenous antibiotics which were started on the prior admission and was seen by Infectious Disease in consultation. Repeat laboratory studies the following day noted resolution of the leukocytosis. The patient developed agitation overnight 9/29 and the [place] brace was damaged. Pt continues to become agitated at night past two nights and took out his PICC line last night. Seen by psych for AMS and agitation, placed on haldol PRN. Given proteinuria and lytic lesions found on CT scan the patient was seen by hematology/oncolocy for plasma cell dyscrasia work up. PT recommends home with home PT. dc planning likely tomorrow.      Problem/Plan - 1:  ·  Problem: Carbapenem-resistant Klebsiella pneumoniae infection.   - continue Avycaz;;end date 10/6/18  - workup and cultures negative to date    Problem/Plan - 3:  ·  Problem: BOUCHRA (acute kidney injury).  Plan: Resolved w IVF.     Hypokalemia: 4  Klor and KCL added       Problem/Plan - 5:  ·  Problem: Tibial plateau fracture.  Plan: Ortho recalled as pt damaged his [place] brace while agitated.   Ortho ordered new brace from orthotist.      Problem/Plan - 6:  ·  Problem: Agitation and anxiety.    Plan: Episodes of agitation overnight resulting in the [place] brace being damaged. In separate incident pt pulled PICC line. Pt w history MR, unclear as to baseline. Cont Haldol and ativan as needed.   Psych follow up today FOR ANXIETY  Neice unable to reach today, voice message left     Problem/Plan - 7:  Problem: Diabetes mellitus of other type with complication. Plan: SS Insulin coverage, close monitoring of blood glucose levels.     Problem/Plan - 8:  ·  Problem: Lytic bone lesion of hip.  Plan: Mult foci of increased uptake on recent bone scan and proteinuria for concern of plasma cell dyscaria  Hematology evaluation Dr Peters consulted and saw the patient   will send SPEP, UPEP     Problem/Plan - 9:  ·  Problem: Prophylactic measure.  Plan: Cont subcut Heparin.     Problem 10:  Low B12  -Will give 1000mcg IM x1 now, will need repeat level in few weeks and monthly injections     Dispo: Home with home PT, will need hematology/oncology follow up

## 2018-10-04 NOTE — PROGRESS NOTE ADULT - SUBJECTIVE AND OBJECTIVE BOX
YAMIL OLEA Patient is a 68y old  Male who presents with a chief complaint of sepsis (02 Oct 2018 12:46)    cc;  fever    Overnight events:         HPI:  67 y/o male with DM, hyperlipidemia, anemia and  MR as per history was discharged on 9/27/18 from [facility] after being diagnosed with Klebsiella pneumoniae sepsis and was sent home with PICC line and AVYCAZ till 10/1/18 was brought back to ER by family as he was somewhat more agitated than his baseline. When pt. arrived to ER he had fever of 101.6, code sepsis was called, pt's wbc on this vist were 23.9 and were within normal range prior to discharge on 9/27/18. As per history there is no cough or  n/v/d. pt. does not contribute to history. pt's ct chest abdomen and pelvis was done in ER and did not show any focus of infection. ct head did not show acute findings as well. no other complaints at the time of admission. pt got ativan in the ER and is resting comfortably.  As per record pt. was seen by ortho service on august 25th for left tibial plateau fracture and brace and NWB LLE was recommended and out- pt. ortho follow up. (29 Sep 2018 01:11)    HPI 10/3/18:    Patient seen and examined at the bedside. He appears in NAD, sitting up in bed. He was less agitated overnight. Off 1 to 1 today and on enhanced supervision. He complains of some shortness of breath, sore throat, and cough. He denies recent fevers, chills, nausea, vomiting, CP, abdominal pain, diarrhea.     I&O's Summary    03 Oct 2018 07:01  -  03 Oct 2018 14:35  --------------------------------------------------------  IN: 0 mL / OUT: 400 mL / NET: -400 mL      Allergies    No Known Allergies    Intolerances      HEALTH ISSUES - PROBLEM Dx:  Intellectual disability  Adjustment disorder with mixed disturbance of emotions and conduct  Acute febrile illness: Acute febrile illness  Prophylactic measure: Prophylactic measure  Lytic bone lesion of hip: Lytic bone lesion of hip  Diabetes mellitus of other type with complication: Diabetes mellitus of other type with complication  Agitation: Agitation  Tibial plateau fracture: Tibial plateau fracture  Bacteremia: Bacteremia  Sepsis: Sepsis  BOUCHRA (acute kidney injury): BOUCHRA (acute kidney injury)  Hyperlipidemia, unspecified hyperlipidemia type: Hyperlipidemia, unspecified hyperlipidemia type  Type 2 diabetes mellitus with hyperglycemia, without long-term current use of insulin: Type 2 diabetes mellitus with hyperglycemia, without long-term current use of insulin  Sepsis, due to unspecified organism: Sepsis, due to unspecified organism        PAST MEDICAL & SURGICAL HISTORY:  Anemia, unspecified type  Hyperlipidemia, unspecified hyperlipidemia type  Diabetes mellitus of other type with complication  Hypertension, unspecified type  No significant past surgical history          Vital Signs Last 24 Hrs  T(C): 37 (03 Oct 2018 09:03), Max: 37.1 (02 Oct 2018 15:45)  T(F): 98.6 (03 Oct 2018 09:03), Max: 98.7 (02 Oct 2018 15:45)  HR: 76 (03 Oct 2018 09:03) (76 - 99)  BP: 159/80 (03 Oct 2018 09:03) (142/74 - 159/80)  RR: 20 (03 Oct 2018 09:03) (20 - 20)  SpO2: 95% (03 Oct 2018 09:03) (95% - 96%)    PHYSICAL EXAM:    GENERAL: NAD  HEAD:  Atraumatic, Normocephalic  EYES: EOMI, PERRLA, conjunctiva and sclera clear  ENMT:  Moist mucous membranes,  No lesions  NECK: Supple, No JVD, Normal thyroid  NERVOUS SYSTEM:  Alert & Oriented X1,  Moves upper and lower extremities  CHEST/LUNG: Clear to auscultation bilaterally; No rales, rhonchi, wheezing,   HEART: Regular rate and rhythm; No murmurs,   ABDOMEN: Soft, Nontender, Nondistended; Bowel sounds present  EXTREMITIES:  +1 LE edema b/l   SKIN: No rashes or lesions    INTERVAL HPI/OVERNIGHT EVENTS:    REVIEW OF SYSTEMS:    CONSTITUTIONAL: No fever, weight loss, or fatigue  RESPIRATORY: No cough, wheezing, hemoptysis; No shortness of breath  CARDIOVASCULAR: No chest pain, palpitations  GASTROINTESTINAL: No abdominal or epigastric pain. No nausea, vomiting  NEUROLOGICAL: No headaches, memory loss, loss of strength.  MISCELLANEOUS:      Vital Signs Last 24 Hrs  T(C): 36.9 (04 Oct 2018 00:34), Max: 37.1 (03 Oct 2018 16:24)  T(F): 98.5 (04 Oct 2018 00:34), Max: 98.8 (03 Oct 2018 16:24)  HR: 87 (04 Oct 2018 08:44) (72 - 87)  BP: 156/90 (04 Oct 2018 08:44) (143/65 - 156/90)  BP(mean): --  RR: 19 (04 Oct 2018 08:44) (19 - 20)  SpO2: 97% (04 Oct 2018 00:34) (96% - 97%)    PHYSICAL EXAM:    GENERAL: NAD, well-groomed  HEENT: PERRL, +EOMI  NECK: soft, Supple, No JVD,   CHEST/LUNG: Clear to auscultation bilaterally; No wheezing  HEART: S1S2+, Regular rate and rhythm; No murmurs, rubs, or gallops  ABDOMEN: Soft, Nontender, Nondistended; Bowel sounds present  EXTREMITIES:  2+ Peripheral Pulses, No clubbing, cyanosis, or edema  SKIN: No rashes or lesions  NEURO: AAOX3, no focal deficits, no motor r sensory loss  PSYCH: normal mood      LABS:                        11.1   10.5  )-----------( 518      ( 04 Oct 2018 08:42 )             36.5     10-04    141  |  100  |  10.0  ----------------------------<  116<H>  3.6   |  26.0  |  0.82    Ca    9.7      04 Oct 2018 08:42    TPro  7.8  /  Alb  3.0<L>  /  TBili  0.4  /  DBili  x   /  AST  21  /  ALT  19  /  AlkPhos  85  10-04            MEDICATIONS  (STANDING):  aspirin enteric coated 325 milliGRAM(s) Oral daily  atorvastatin 20 milliGRAM(s) Oral at bedtime  cefTAZidime/avibactam IVPB      cefTAZidime/avibactam IVPB 2.5 Gram(s) IV Intermittent every 8 hours  dextrose 5%. 1000 milliLiter(s) (50 mL/Hr) IV Continuous <Continuous>  dextrose 50% Injectable 12.5 Gram(s) IV Push once  dextrose 50% Injectable 25 Gram(s) IV Push once  dextrose 50% Injectable 25 Gram(s) IV Push once  FIRST- Mouthwash  BLM 10 milliLiter(s) Swish and Spit three times a day  folic acid 1 milliGRAM(s) Oral daily  heparin  Injectable 5000 Unit(s) SubCutaneous every 12 hours  insulin lispro (HumaLOG) corrective regimen sliding scale   SubCutaneous three times a day before meals  insulin lispro (HumaLOG) corrective regimen sliding scale   SubCutaneous at bedtime  melatonin 3 milliGRAM(s) Oral at bedtime  nystatin    Suspension 334783 Unit(s) Oral four times a day  pantoprazole    Tablet 40 milliGRAM(s) Oral before breakfast  saccharomyces boulardii 250 milliGRAM(s) Oral two times a day  simethicone 80 milliGRAM(s) Chew four times a day    MEDICATIONS  (PRN):  acetaminophen   Tablet .. 650 milliGRAM(s) Oral every 6 hours PRN Temp greater or equal to 38C (100.4F)  aluminum hydroxide/magnesium hydroxide/simethicone Suspension 30 milliLiter(s) Oral every 6 hours PRN Dyspepsia  dextrose 40% Gel 15 Gram(s) Oral once PRN Blood Glucose LESS THAN 70 milliGRAM(s)/deciliter  glucagon  Injectable 1 milliGRAM(s) IntraMuscular once PRN Glucose LESS THAN 70 milligrams/deciliter  haloperidol    Injectable 2 milliGRAM(s) IntraMuscular every 6 hours PRN Aggitation  LORazepam   Injectable 1 milliGRAM(s) IV Push every 6 hours PRN Anxiety/agitation      RADIOLOGY & ADDITIONAL TESTS: YAMIL OLEA Patient is a 68y old  Male who presents with a chief complaint of sepsis (02 Oct 2018 12:46)    cc;  fever    Overnight events:  patient is very anxious today anxious that he might fall. [place] video interpretator used. explained to him his medical condition. but he was afraid and was asking when he can go home. tried calling neice but unable to reach. he is afraid to be alone in room by himself.      HPI:  67 y/o male with DM, hyperlipidemia, anemia and  MR as per history was discharged on 9/27/18 from [facility] after being diagnosed with Klebsiella pneumoniae sepsis and was sent home with PICC line and AVYCAZ till 10/1/18 was brought back to ER by family as he was somewhat more agitated than his baseline. When pt. arrived to ER he had fever of 101.6, code sepsis was called, pt's wbc on this vist were 23.9 and were within normal range prior to discharge on 9/27/18. As per history there is no cough or  n/v/d. pt. does not contribute to history. pt's ct chest abdomen and pelvis was done in ER and did not show any focus of infection. ct head did not show acute findings as well. no other complaints at the time of admission. pt got ativan in the ER and is resting comfortably.  As per record pt. was seen by ortho service on august 25th for left tibial plateau fracture and brace and NWB LLE was recommended and out- pt. ortho follow up. (29 Sep 2018 01:11)        HEALTH ISSUES - PROBLEM Dx:  Intellectual disability  Adjustment disorder with mixed disturbance of emotions and conduct  Acute febrile illness: Acute febrile illness  Prophylactic measure: Prophylactic measure  Lytic bone lesion of hip: Lytic bone lesion of hip  Diabetes mellitus of other type with complication: Diabetes mellitus of other type with complication  Agitation: Agitation  Tibial plateau fracture: Tibial plateau fracture  Bacteremia: Bacteremia  Sepsis: Sepsis  BOUCHRA (acute kidney injury): BOUCHRA (acute kidney injury)  Hyperlipidemia, unspecified hyperlipidemia type: Hyperlipidemia, unspecified hyperlipidemia type  Type 2 diabetes mellitus with hyperglycemia, without long-term current use of insulin: Type 2 diabetes mellitus with hyperglycemia, without long-term current use of insulin  Sepsis, due to unspecified organism: Sepsis, due to unspecified organism        PAST MEDICAL & SURGICAL HISTORY:  Anemia, unspecified type  Hyperlipidemia, unspecified hyperlipidemia type  Diabetes mellitus of other type with complication  Hypertension, unspecified type  No significant past surgical history      Vital Signs Last 24 Hrs  T(C): 36.9 (04 Oct 2018 00:34), Max: 37.1 (03 Oct 2018 16:24)  T(F): 98.5 (04 Oct 2018 00:34), Max: 98.8 (03 Oct 2018 16:24)  HR: 87 (04 Oct 2018 08:44) (72 - 87)  BP: 156/90 (04 Oct 2018 08:44) (143/65 - 156/90)  BP(mean): --  RR: 19 (04 Oct 2018 08:44) (19 - 20)  SpO2: 97% (04 Oct 2018 00:34) (96% - 97%)      PHYSICAL EXAM:    GENERAL: NAD  HEAD:  Atraumatic, Normocephalic, obese, in recliner chair sitting  EYES: EOMI, PERRLA, conjunctiva and sclera clear  ENMT:  Moist mucous membranes,  No lesions  NECK: Supple, No JVD, Normal thyroid  NERVOUS SYSTEM:  Alert & Oriented X1,  Moves upper and lower extremities  CHEST/LUNG: Clear to auscultation bilaterally; No rales, rhonchi, wheezing,   HEART: Regular rate and rhythm; No murmurs,   ABDOMEN: Soft, Nontender, Nondistended; Bowel sounds present  EXTREMITIES:  +1 LE edema b/l   SKIN: No rashes or lesions  ext: brace in RLE        LABS:                        11.1   10.5  )-----------( 518      ( 04 Oct 2018 08:42 )             36.5     10-04    141  |  100  |  10.0  ----------------------------<  116<H>  3.6   |  26.0  |  0.82    Ca    9.7      04 Oct 2018 08:42    TPro  7.8  /  Alb  3.0<L>  /  TBili  0.4  /  DBili  x   /  AST  21  /  ALT  19  /  AlkPhos  85  10-04            MEDICATIONS  (STANDING):  aspirin enteric coated 325 milliGRAM(s) Oral daily  atorvastatin 20 milliGRAM(s) Oral at bedtime  cefTAZidime/avibactam IVPB      cefTAZidime/avibactam IVPB 2.5 Gram(s) IV Intermittent every 8 hours  dextrose 5%. 1000 milliLiter(s) (50 mL/Hr) IV Continuous <Continuous>  dextrose 50% Injectable 12.5 Gram(s) IV Push once  dextrose 50% Injectable 25 Gram(s) IV Push once  dextrose 50% Injectable 25 Gram(s) IV Push once  FIRST- Mouthwash  BLM 10 milliLiter(s) Swish and Spit three times a day  folic acid 1 milliGRAM(s) Oral daily  heparin  Injectable 5000 Unit(s) SubCutaneous every 12 hours  insulin lispro (HumaLOG) corrective regimen sliding scale   SubCutaneous three times a day before meals  insulin lispro (HumaLOG) corrective regimen sliding scale   SubCutaneous at bedtime  melatonin 3 milliGRAM(s) Oral at bedtime  nystatin    Suspension 602140 Unit(s) Oral four times a day  pantoprazole    Tablet 40 milliGRAM(s) Oral before breakfast  saccharomyces boulardii 250 milliGRAM(s) Oral two times a day  simethicone 80 milliGRAM(s) Chew four times a day    MEDICATIONS  (PRN):  acetaminophen   Tablet .. 650 milliGRAM(s) Oral every 6 hours PRN Temp greater or equal to 38C (100.4F)  aluminum hydroxide/magnesium hydroxide/simethicone Suspension 30 milliLiter(s) Oral every 6 hours PRN Dyspepsia  dextrose 40% Gel 15 Gram(s) Oral once PRN Blood Glucose LESS THAN 70 milliGRAM(s)/deciliter  glucagon  Injectable 1 milliGRAM(s) IntraMuscular once PRN Glucose LESS THAN 70 milligrams/deciliter  haloperidol    Injectable 2 milliGRAM(s) IntraMuscular every 6 hours PRN Aggitation  LORazepam   Injectable 1 milliGRAM(s) IV Push every 6 hours PRN Anxiety/agitation      RADIOLOGY & ADDITIONAL TESTS:

## 2018-10-05 ENCOUNTER — TRANSCRIPTION ENCOUNTER (OUTPATIENT)
Age: 68
End: 2018-10-05

## 2018-10-05 LAB
% ALBUMIN: 39.6 % — SIGNIFICANT CHANGE UP
% ALPHA 1: 7.5 % — SIGNIFICANT CHANGE UP
% ALPHA 2: 15.7 % — SIGNIFICANT CHANGE UP
% BETA: 14.1 % — SIGNIFICANT CHANGE UP
% GAMMA: 23.1 % — SIGNIFICANT CHANGE UP
ALBUMIN SERPL ELPH-MCNC: 2.8 G/DL — LOW (ref 3.3–5.2)
ALBUMIN SERPL ELPH-MCNC: 3 G/DL — LOW (ref 3.6–5.5)
ALBUMIN/GLOB SERPL ELPH: 0.7 RATIO — SIGNIFICANT CHANGE UP
ALP SERPL-CCNC: 78 U/L — SIGNIFICANT CHANGE UP (ref 40–120)
ALPHA1 GLOB SERPL ELPH-MCNC: 0.6 G/DL — HIGH (ref 0.1–0.4)
ALPHA2 GLOB SERPL ELPH-MCNC: 1.2 G/DL — HIGH (ref 0.5–1)
ALT FLD-CCNC: 20 U/L — SIGNIFICANT CHANGE UP
ANION GAP SERPL CALC-SCNC: 13 MMOL/L — SIGNIFICANT CHANGE UP (ref 5–17)
AST SERPL-CCNC: 21 U/L — SIGNIFICANT CHANGE UP
B-GLOBULIN SERPL ELPH-MCNC: 1.1 G/DL — HIGH (ref 0.5–1)
BILIRUB SERPL-MCNC: 0.4 MG/DL — SIGNIFICANT CHANGE UP (ref 0.4–2)
BUN SERPL-MCNC: 13 MG/DL — SIGNIFICANT CHANGE UP (ref 8–20)
CALCIUM SERPL-MCNC: 9.4 MG/DL — SIGNIFICANT CHANGE UP (ref 8.6–10.2)
CHLORIDE SERPL-SCNC: 102 MMOL/L — SIGNIFICANT CHANGE UP (ref 98–107)
CO2 SERPL-SCNC: 27 MMOL/L — SIGNIFICANT CHANGE UP (ref 22–29)
CREAT SERPL-MCNC: 0.85 MG/DL — SIGNIFICANT CHANGE UP (ref 0.5–1.3)
CULTURE RESULTS: SIGNIFICANT CHANGE UP
GAMMA GLOBULIN: 1.7 G/DL — HIGH (ref 0.6–1.6)
GLUCOSE BLDC GLUCOMTR-MCNC: 108 MG/DL — HIGH (ref 70–99)
GLUCOSE BLDC GLUCOMTR-MCNC: 118 MG/DL — HIGH (ref 70–99)
GLUCOSE BLDC GLUCOMTR-MCNC: 143 MG/DL — HIGH (ref 70–99)
GLUCOSE BLDC GLUCOMTR-MCNC: 175 MG/DL — HIGH (ref 70–99)
GLUCOSE SERPL-MCNC: 109 MG/DL — SIGNIFICANT CHANGE UP (ref 70–115)
HCT VFR BLD CALC: 33.6 % — LOW (ref 42–52)
HGB BLD-MCNC: 10.4 G/DL — LOW (ref 14–18)
INTERPRETATION SERPL IFE-IMP: SIGNIFICANT CHANGE UP
MCHC RBC-ENTMCNC: 27.7 PG — SIGNIFICANT CHANGE UP (ref 27–31)
MCHC RBC-ENTMCNC: 31 G/DL — LOW (ref 32–36)
MCV RBC AUTO: 89.6 FL — SIGNIFICANT CHANGE UP (ref 80–94)
PLATELET # BLD AUTO: 480 K/UL — HIGH (ref 150–400)
POTASSIUM SERPL-MCNC: 3.4 MMOL/L — LOW (ref 3.5–5.3)
POTASSIUM SERPL-SCNC: 3.4 MMOL/L — LOW (ref 3.5–5.3)
PROT PATTERN SERPL ELPH-IMP: SIGNIFICANT CHANGE UP
PROT SERPL-MCNC: 7.2 G/DL — SIGNIFICANT CHANGE UP (ref 6.6–8.7)
PROT SERPL-MCNC: 7.5 G/DL — SIGNIFICANT CHANGE UP (ref 6–8.3)
PROT SERPL-MCNC: 7.5 G/DL — SIGNIFICANT CHANGE UP (ref 6–8.3)
RBC # BLD: 3.75 M/UL — LOW (ref 4.6–6.2)
RBC # FLD: 16.8 % — HIGH (ref 11–15.6)
SODIUM SERPL-SCNC: 142 MMOL/L — SIGNIFICANT CHANGE UP (ref 135–145)
SPECIMEN SOURCE: SIGNIFICANT CHANGE UP
WBC # BLD: 8.6 K/UL — SIGNIFICANT CHANGE UP (ref 4.8–10.8)
WBC # FLD AUTO: 8.6 K/UL — SIGNIFICANT CHANGE UP (ref 4.8–10.8)

## 2018-10-05 PROCEDURE — 99232 SBSQ HOSP IP/OBS MODERATE 35: CPT | Mod: GC

## 2018-10-05 RX ORDER — POTASSIUM CHLORIDE 20 MEQ
40 PACKET (EA) ORAL ONCE
Qty: 0 | Refills: 0 | Status: COMPLETED | OUTPATIENT
Start: 2018-10-05 | End: 2018-10-05

## 2018-10-05 RX ORDER — NIFEDIPINE 30 MG
30 TABLET, EXTENDED RELEASE 24 HR ORAL DAILY
Qty: 0 | Refills: 0 | Status: DISCONTINUED | OUTPATIENT
Start: 2018-10-05 | End: 2018-10-17

## 2018-10-05 RX ADMIN — Medication 2: at 16:58

## 2018-10-05 RX ADMIN — Medication 325 MILLIGRAM(S): at 15:25

## 2018-10-05 RX ADMIN — HEPARIN SODIUM 5000 UNIT(S): 5000 INJECTION INTRAVENOUS; SUBCUTANEOUS at 05:56

## 2018-10-05 RX ADMIN — Medication 3 MILLIGRAM(S): at 21:30

## 2018-10-05 RX ADMIN — SIMETHICONE 80 MILLIGRAM(S): 80 TABLET, CHEWABLE ORAL at 05:55

## 2018-10-05 RX ADMIN — Medication 500000 UNIT(S): at 00:50

## 2018-10-05 RX ADMIN — Medication 500000 UNIT(S): at 15:25

## 2018-10-05 RX ADMIN — PANTOPRAZOLE SODIUM 40 MILLIGRAM(S): 20 TABLET, DELAYED RELEASE ORAL at 05:56

## 2018-10-05 RX ADMIN — Medication 40 MILLIEQUIVALENT(S): at 15:24

## 2018-10-05 RX ADMIN — Medication 500000 UNIT(S): at 18:26

## 2018-10-05 RX ADMIN — SIMETHICONE 80 MILLIGRAM(S): 80 TABLET, CHEWABLE ORAL at 18:26

## 2018-10-05 RX ADMIN — Medication 1 MILLIGRAM(S): at 19:18

## 2018-10-05 RX ADMIN — Medication 1 MILLIGRAM(S): at 04:18

## 2018-10-05 RX ADMIN — Medication 500000 UNIT(S): at 05:56

## 2018-10-05 RX ADMIN — Medication 1 MILLIGRAM(S): at 15:26

## 2018-10-05 RX ADMIN — SIMETHICONE 80 MILLIGRAM(S): 80 TABLET, CHEWABLE ORAL at 15:25

## 2018-10-05 RX ADMIN — DIPHENHYDRAMINE HYDROCHLORIDE AND LIDOCAINE HYDROCHLORIDE AND ALUMINUM HYDROXIDE AND MAGNESIUM HYDRO 10 MILLILITER(S): KIT at 05:56

## 2018-10-05 RX ADMIN — ATORVASTATIN CALCIUM 20 MILLIGRAM(S): 80 TABLET, FILM COATED ORAL at 21:30

## 2018-10-05 RX ADMIN — Medication 250 MILLIGRAM(S): at 18:26

## 2018-10-05 RX ADMIN — Medication 250 MILLIGRAM(S): at 05:55

## 2018-10-05 RX ADMIN — HEPARIN SODIUM 5000 UNIT(S): 5000 INJECTION INTRAVENOUS; SUBCUTANEOUS at 18:26

## 2018-10-05 NOTE — DISCHARGE NOTE ADULT - CARE PLAN
Principal Discharge DX:	Lytic bone lesion of hip  Goal:	s/p ir biopsy  Assessment and plan of treatment:	follow up with hematology  Secondary Diagnosis:	CVA (cerebral vascular accident)  Goal:	asa and statin  Assessment and plan of treatment:	praful chang with keppra  Secondary Diagnosis:	Hyperlipidemia, unspecified hyperlipidemia type  Goal:	home meds  Secondary Diagnosis:	Hypertension, unspecified type  Goal:	home meds  Secondary Diagnosis:	Tibial plateau fracture  Goal:	follow up with ortho  Secondary Diagnosis:	Type 2 diabetes mellitus with hyperglycemia, without long-term current use of insulin  Goal:	home meds Principal Discharge DX:	Lytic bone lesion of hip  Goal:	COULD NOT PERFORM BIOSPY DUE TO CONSENT AND NO FAMILY WAS AVAILABLE IN 2 days  Assessment and plan of treatment:	follow up with hematology as outpatient for biopsy  Secondary Diagnosis:	CVA (cerebral vascular accident)  Goal:	asa and statin  Assessment and plan of treatment:	praful chang with keppra  Secondary Diagnosis:	Hyperlipidemia, unspecified hyperlipidemia type  Goal:	home meds  Secondary Diagnosis:	Hypertension, unspecified type  Goal:	home meds  Secondary Diagnosis:	Tibial plateau fracture  Goal:	follow up with ortho  Secondary Diagnosis:	Type 2 diabetes mellitus with hyperglycemia, without long-term current use of insulin  Goal:	home meds

## 2018-10-05 NOTE — DISCHARGE NOTE ADULT - HOSPITAL COURSE
68M with PMHx of HTN, DM, HLD, MR with baseline poor mental status, recent admission for a tibial plateau fracture and sepsis due to Klebsiella UTI/bacteremia was discharged on IV abx, presented to ED with worsening agitation and fever. On presentation, the patient was noted to be febrile(101.6), WBC(23.9). Urinalysis and respiratory panel were negative. CT of the head was without acute intracranial pathology. CT of the chest, abdomen, and pelvis noted lytic and sclerotic lesions in the pelvis but no acute pathology. The patient was continued on intravenous antibiotics which were started on the prior admission and was seen by Infectious Disease in consultation. Repeat laboratory studies the following day noted resolution of the leukocytosis. The patient developed agitation overnight 9/29 and the [place] brace was damaged and he took out his PICC line. Seen by psych for AMS and agitation, placed on haldol PRN. Given proteinuria and lytic lesions found on CT scan the patient was seen by hematology oncology for possible plasma cell dyscrasia work up. Biopsy planned for 10/8 postponed till 10/9 @ 11am per IR,   PT recommends home with home PT. .67 y/o male with DM, hyperlipidemia, anemia and  MR as per history was discharged on 9/27/18 from [facility] after being diagnosed with Klebsiella pneumoniae sepsis and was sent home with PICC line and AVYCAZ till 10/1/18 was brought back to ER by family as he was somewhat more agitated than his baseline. When pt. arrived to ER he had fever of 101.6, code sepsis was called, pt's wbc on this vist were 23.9 and were within normal range prior to discharge on 9/27/18. pt's ct chest abdomen and pelvis was done in ER and did not show any focus of infection but did show lytic lesions in the pelvis.  ct head did not show acute findings as well. no other complaints at the time of admission. As per record pt. was seen by ortho service on august 25th for left tibial plateau fracture and brace and NWB LLE was recommended and out- pt. ortho follow up.     The patient was continued on intravenous antibiotics which were started on the prior admission and was seen by Infectious Disease in consultation. Repeat laboratory studies the following day noted resolution of the leukocytosis. The patient developed agitation overnight 9/29 and the [place] brace was damaged and he pulled out his PICC line. Seen by psych for AMS and agitation, placed on haldol PRN. Given proteinuria and lytic lesions found on CT scan the patient was seen by hematology oncology for possible plasma cell dyscrasia work up. Biopsy planned for 10/8 postponed till 10/9 @ 11am per IR,    Patient was then a code rapid response on 10/9 for ams, low grade fever and seizure like behavior. Patient started on asa aand iv keppra and ct head showed:    IMPRESSION:         Limited study secondary to motion artifact. Motion artifact versus low   attenuation areas in the left frontotemporal lobe may represent acute   infarct in proper clinical setting. Further evaluation with MR is   recommended.  Moderate volume loss and small vessel ischemic changes.    Patient then had a eeg which was abnormal but did not show seizure like activity but continued on keppra. Patient improved clinically and is now ready for repeat IR biopsy tomorrow. patient seen at bedside and in nad. .69 y/o male with DM, hyperlipidemia, anemia and  MR as per history was discharged on 9/27/18 from [facility] after being diagnosed with Klebsiella pneumoniae sepsis and was sent home with PICC line and AVYCAZ till 10/1/18 was brought back to ER by family as he was somewhat more agitated than his baseline. When pt. arrived to ER he had fever of 101.6, code sepsis was called, pt's wbc on this vist were 23.9 and were within normal range prior to discharge on 9/27/18. pt's ct chest abdomen and pelvis was done in ER and did not show any focus of infection but did show lytic lesions in the pelvis.  ct head did not show acute findings as well. no other complaints at the time of admission. As per record pt. was seen by ortho service on august 25th for left tibial plateau fracture and brace and NWB LLE was recommended and out- pt. ortho follow up.     The patient was continued on intravenous antibiotics which were started on the prior admission and was seen by Infectious Disease in consultation. Repeat laboratory studies the following day noted resolution of the leukocytosis. The patient developed agitation overnight 9/29 and the [place] brace was damaged and he pulled out his PICC line. Seen by psych for AMS and agitation, placed on haldol PRN. Given proteinuria and lytic lesions found on CT scan the patient was seen by hematology oncology for possible plasma cell dyscrasia work up. Biopsy planned for 10/8 postponed till 10/9 @ 11am per IR,    Patient was then a code rapid response on 10/9 for ams, low grade fever and seizure like behavior. Patient started on asa aand iv keppra and ct head showed:    IMPRESSION:         Limited study secondary to motion artifact. Motion artifact versus low   attenuation areas in the left frontotemporal lobe may represent acute   infarct in proper clinical setting. Further evaluation with MR is   recommended.  Moderate volume loss and small vessel ischemic changes.    Patient then had a eeg which was abnormal but did not show seizure like activity but continued on keppra. Patient improved clinically. patient was planned for ir biopsy on 10.16 but consent could not be obtained all day and on 10.17 there was still no family members to consent, patient is stable for dc to praful and to get outpaitnet biopsy .       time spent ond c 34 minutes

## 2018-10-05 NOTE — PROGRESS NOTE ADULT - SUBJECTIVE AND OBJECTIVE BOX
YAMIL OLEA Patient is a 68y old  Male who presents with a chief complaint of sepsis (04 Oct 2018 14:49)     HPI:  67 y/o male with DM, hyperlipidemia, anemia and  MR as per history was discharged on 9/27/18 from [facility] after being diagnosed with Klebsiella pneumoniae sepsis and was sent home with PICC line and AVYCAZ till 10/1/18 was brought back to ER by family as he was somewhat more agitated than his baseline. When pt. arrived to ER he had fever of 101.6, code sepsis was called, pt's wbc on this vist were 23.9 and were within normal range prior to discharge on 9/27/18. As per history there is no cough or  n/v/d. pt. does not contribute to history. pt's ct chest abdomen and pelvis was done in ER and did not show any focus of infection. ct head did not show acute findings as well. no other complaints at the time of admission. pt. got ativan in the ER and is resting comfortably.  As per record pt. was seen by ortho service on august 25th for left tibial plateau fracture and brace and NWB LLE was recommended and out- pt. ortho follow up. (29 Sep 2018 01:11)    HPI: 10/5/18:    Patient seen and examined at the bedside along with a . He is very limited in his responses, mostly only answering yes or no. He states he has some abdominal pain, but no nausea or vomiting that has been present for several days. Otherwise he states he feels well. We explained that he will need two more days of antibiotics before he can go home. He denies fevers, chills, nausea, vomiting, CP, SOB, diarrhea, constipation.     I&O's Summary    Allergies    No Known Allergies    Intolerances      HEALTH ISSUES - PROBLEM Dx:  Intellectual disability  Adjustment disorder with mixed disturbance of emotions and conduct  Acute febrile illness: Acute febrile illness  Prophylactic measure: Prophylactic measure  Lytic bone lesion of hip: Lytic bone lesion of hip  Diabetes mellitus of other type with complication: Diabetes mellitus of other type with complication  Agitation: Agitation  Tibial plateau fracture: Tibial plateau fracture  Bacteremia: Bacteremia  Sepsis: Sepsis  BOUCHRA (acute kidney injury): BOUCHRA (acute kidney injury)  Hyperlipidemia, unspecified hyperlipidemia type: Hyperlipidemia, unspecified hyperlipidemia type  Type 2 diabetes mellitus with hyperglycemia, without long-term current use of insulin: Type 2 diabetes mellitus with hyperglycemia, without long-term current use of insulin  Sepsis, due to unspecified organism: Sepsis, due to unspecified organism        PAST MEDICAL & SURGICAL HISTORY:  Anemia, unspecified type  Hyperlipidemia, unspecified hyperlipidemia type  Diabetes mellitus of other type with complication  Hypertension, unspecified type  No significant past surgical history          Vital Signs Last 24 Hrs  T(C): 36.8 (05 Oct 2018 08:31), Max: 36.8 (05 Oct 2018 08:31)  T(F): 98.3 (05 Oct 2018 08:31), Max: 98.3 (05 Oct 2018 08:31)  HR: 74 (05 Oct 2018 08:31) (74 - 93)  BP: 148/71 (05 Oct 2018 08:31) (148/71 - 153/70)  RR: 18 (05 Oct 2018 08:31) (18 - 18)  SpO2: 96% (05 Oct 2018 08:31) (96% - 98%)  PHYSICAL EXAM:    GENERAL: NAD  HEAD:  Atraumatic, Normocephalic  EYES: EOMI, PERRLA, conjunctiva and sclera clear  ENMT:  Moist mucous membranes,  No lesions  NERVOUS SYSTEM:  Alert & Oriented,  Moves upper and lower extremities  CHEST/LUNG: Clear to auscultation bilaterally; No rales, rhonchi, wheezing,   HEART: Regular rate and rhythm; No murmurs,   ABDOMEN: Soft, Nontender, Nondistended; Bowel sounds present  EXTREMITIES:  +1 LE edema       acetaminophen   Tablet .. 650 milliGRAM(s) Oral every 6 hours PRN  aluminum hydroxide/magnesium hydroxide/simethicone Suspension 30 milliLiter(s) Oral every 6 hours PRN  aspirin enteric coated 325 milliGRAM(s) Oral daily  atorvastatin 20 milliGRAM(s) Oral at bedtime  cefTAZidime/avibactam IVPB      cefTAZidime/avibactam IVPB 2.5 Gram(s) IV Intermittent every 8 hours  dextrose 40% Gel 15 Gram(s) Oral once PRN  dextrose 5%. 1000 milliLiter(s) IV Continuous <Continuous>  dextrose 50% Injectable 12.5 Gram(s) IV Push once  dextrose 50% Injectable 25 Gram(s) IV Push once  dextrose 50% Injectable 25 Gram(s) IV Push once  folic acid 1 milliGRAM(s) Oral daily  glucagon  Injectable 1 milliGRAM(s) IntraMuscular once PRN  haloperidol    Injectable 2 milliGRAM(s) IntraMuscular every 6 hours PRN  heparin  Injectable 5000 Unit(s) SubCutaneous every 12 hours  insulin lispro (HumaLOG) corrective regimen sliding scale   SubCutaneous three times a day before meals  insulin lispro (HumaLOG) corrective regimen sliding scale   SubCutaneous at bedtime  LORazepam   Injectable 1 milliGRAM(s) IV Push every 6 hours PRN  melatonin 3 milliGRAM(s) Oral at bedtime  nystatin    Suspension 483308 Unit(s) Oral four times a day  pantoprazole    Tablet 40 milliGRAM(s) Oral before breakfast  potassium chloride    Tablet ER 40 milliEquivalent(s) Oral once  saccharomyces boulardii 250 milliGRAM(s) Oral two times a day  simethicone 80 milliGRAM(s) Chew four times a day      LABS:                          10.4   8.6   )-----------( 480      ( 05 Oct 2018 08:18 )             33.6     10-05    142  |  102  |  13.0  ----------------------------<  109  3.4<L>   |  27.0  |  0.85    Ca    9.4      05 Oct 2018 08:18    TPro  7.2  /  Alb  2.8<L>  /  TBili  0.4  /  DBili  x   /  AST  21  /  ALT  20  /  AlkPhos  78  10-05    LIVER FUNCTIONS - ( 05 Oct 2018 08:18 )  Alb: 2.8 g/dL / Pro: 7.2 g/dL / ALK PHOS: 78 U/L / ALT: 20 U/L / AST: 21 U/L / GGT: x             CAPILLARY BLOOD GLUCOSE      POCT Blood Glucose.: 108 mg/dL (05 Oct 2018 09:01)  POCT Blood Glucose.: 156 mg/dL (04 Oct 2018 21:16)  POCT Blood Glucose.: 112 mg/dL (04 Oct 2018 16:48)      Consultant notes reviewed    Case discussed with provider /patient

## 2018-10-05 NOTE — PROGRESS NOTE ADULT - NSHPATTENDINGPLANDISCUSS_GEN_ALL_CORE
patient, rn, cm and sw
patient, rn, cm andsw
medical team
NP - Kelly
medical team
patient, rn, cm and sw

## 2018-10-05 NOTE — DISCHARGE NOTE ADULT - PATIENT PORTAL LINK FT
You can access the [email or web address], offered by [facility], by registering with the following website: [email or web address]

## 2018-10-05 NOTE — DISCHARGE NOTE ADULT - PLAN OF CARE
s/p ir biopsy follow up with hematology asa and statin praful chang with sophia home meds follow up with ortho COULD NOT PERFORM BIOSPY DUE TO CONSENT AND NO FAMILY WAS AVAILABLE IN 2 days follow up with hematology as outpatient for biopsy

## 2018-10-05 NOTE — PROGRESS NOTE ADULT - ASSESSMENT
Assessment and Plan:     68M with a recent admission for a tibial plateau fracture and sepsis due to Klebsiella UTI/bacteremia presented with agitation and fever. On presentation, the patient was noted to be febrile(101.6), WBC(23.9). Urinalysis and respiratory panel were negative. CT of the head was without acute intracranial pathology. CT of the chest, abdomen, and pelvis noted lytic and sclerotic lesions in the pelvis but no acute pathology. The patient was continued on intravenous antibiotics which were started on the prior admission and was seen by Infectious Disease in consultation. Repeat laboratory studies the following day noted resolution of the leukocytosis. The patient developed agitation overnight 9/29 and the [place]ud[place] brace was damaged and he took out his PICC line. Seen by psych for AMS and agitation, placed on haldol PRN. Given proteinuria and lytic lesions found on CT scan the patient was seen by hematology/oncolocy for plasma cell dyscrasia work up. PT recommends home with home PT. He will most likely need to complete his IV abx course here before discharge.      Problem/Plan - 1:  ·  Problem: Carbapenem-resistant Klebsiella pneumoniae infection.   - continue Avycaz;;end date 10/6/18  - workup and cultures negative to date    Problem/Plan - 3:  ·  Problem: BOUCHRA (acute kidney injury).  Plan: Resolved w IVF.     Hypokalemia: 4  Replaced, recheck in AM        Problem/Plan - 5:  ·  Problem: Tibial plateau fracture.  Plan: Ortho recalled as pt damaged his [place] brace while agitated.   Ortho ordered new brace from orthotist.      Problem/Plan - 6:  ·  Problem: Agitation and anxiety.    Plan: Episodes of agitation overnight resulting in the [place] brace being damaged. In separate incident pt pulled PICC line. Pt w history MR, unclear as to baseline. Cont Haldol and ativan as needed.   Spoke to psych yesterday, no plans for initiating anti-anxiety medication or SSRI/SNRI. He should follow up with psychiatry as an outpatient.       Problem/Plan - 7:  Problem: Diabetes mellitus of other type with complication. Plan: SS Insulin coverage, close monitoring of blood glucose levels.     Problem/Plan - 8:  ·  Problem: Lytic bone lesion of hip.  Plan: Mult foci of increased uptake on recent bone scan and proteinuria for concern of plasma cell dyscrasia  Hematology evaluation Dr Douglas consulted and saw the patient   -Lab studies sent, some resulted  -Will need to follow up with hematology as an outpatient      Problem/Plan - 9:  ·  Problem: Prophylactic measure.  Plan: Cont subcut Heparin.     Problem 10:  Low B12  -Will give 1000mcg IM x1 now, will need repeat level in few weeks and monthly injections     Dispo: Home with home PT, will need hematology/oncology follow up Assessment and Plan:     68M with a recent admission for a tibial plateau fracture and sepsis due to Klebsiella UTI/bacteremia presented with agitation and fever. On presentation, the patient was noted to be febrile(101.6), WBC(23.9). Urinalysis and respiratory panel were negative. CT of the head was without acute intracranial pathology. CT of the chest, abdomen, and pelvis noted lytic and sclerotic lesions in the pelvis but no acute pathology. The patient was continued on intravenous antibiotics which were started on the prior admission and was seen by Infectious Disease in consultation. Repeat laboratory studies the following day noted resolution of the leukocytosis. The patient developed agitation overnight 9/29 and the Trudy brace was damaged and he took out his PICC line. Seen by psych for AMS and agitation, placed on haldol PRN. Given proteinuria and lytic lesions found on CT scan the patient was seen by hematology/oncolocy for plasma cell dyscrasia work up. PT recommends home with home PT. He will most likely need to complete his IV abx course here before discharge.      Problem/Plan - 1:  ·  Problem: Carbapenem-resistant Klebsiella pneumoniae infection.   - continue Avycaz;;end date 10/6/18  - workup and cultures negative to date    Problem/Plan - 3:  ·  Problem: BOUCHRA (acute kidney injury).  Plan: Resolved w IVF.     Hypokalemia: 4  Replaced, recheck in AM        Problem/Plan - 5:  ·  Problem: Tibial plateau fracture.  Plan: Ortho recalled as pt damaged his [place] brace while agitated.   Ortho ordered new brace from orthotist.      Problem/Plan - 6:  ·  Problem: Agitation and anxiety.    Plan: Episodes of agitation overnight resulting in the [place] brace being damaged. In separate incident pt pulled PICC line. Pt w history MR, unclear as to baseline. Cont Haldol and ativan as needed.   Spoke to psych yesterday, no plans for initiating anti-anxiety medication or SSRI/SNRI. He should follow up with psychiatry as an outpatient.       Problem/Plan - 7:  Problem: Diabetes mellitus of other type with complication. Plan: SS Insulin coverage, close monitoring of blood glucose levels.     Problem/Plan - 8:  ·  Problem: Lytic bone lesion of hip.  Plan: Mult foci of increased uptake on recent bone scan and proteinuria for concern of plasma cell dyscrasia  Hematology evaluation Dr Douglas consulted and saw the patient   -Lab studies sent, some resulted  -Will need to follow up with hematology as an outpatient   -Plan for bone marrow biopsy on Monday as per oncology, most likely d/c home after that      Problem/Plan - 9:  ·  Problem: Prophylactic measure.  Plan: Cont subcut Heparin.     Problem 10:  Low B12  -Will give 1000mcg IM x1 now, will need repeat level in few weeks and monthly injections     Dispo: Home with home PT, will need hematology/oncology follow up

## 2018-10-05 NOTE — DISCHARGE NOTE ADULT - COMMUNITY RESOURCES
Medical transportation to and from [place] call [phone] Medical transportation to and from [place] call [phone] [address] APPOINTMENT WEDNESDAY, OCTOBER 10TH AT 9:00AM

## 2018-10-05 NOTE — DISCHARGE NOTE ADULT - CARE PROVIDER_API CALL
Adolfo Douglas), Hematology; Medical Oncology  [address] [place] [address]  Phone: [phone]  Fax: [phone]    PCP,   Phone: (   )    -  Fax: (   )    -

## 2018-10-05 NOTE — DIETITIAN INITIAL EVALUATION ADULT. - PERTINENT LABORATORY DATA
10-05 Na142 mmol/L Glu 109 mg/dL K+ 3.4 mmol/L<L> Cr  0.85 mg/dL BUN 13.0 mg/dL Phos n/a   Alb 2.8 g/dL<L> PAB n/a      9/18  A1c 6.6%

## 2018-10-05 NOTE — DISCHARGE NOTE ADULT - MEDICATION SUMMARY - MEDICATIONS TO STOP TAKING
I will STOP taking the medications listed below when I get home from the hospital:    ceftazidime-avibactam 2 g-0.5 g intravenous injection  -- 2.5 gram(s) intravenous every 8 hours until 10/1/18    Vicodin 5 mg-300 mg oral tablet  -- 1 tab(s) by mouth every 6 hours, As Needed MDD:4 tabs

## 2018-10-05 NOTE — DISCHARGE NOTE ADULT - MEDICATION SUMMARY - MEDICATIONS TO TAKE
I will START or STAY ON the medications listed below when I get home from the hospital:    acetaminophen 325 mg oral tablet  -- 2 tab(s) by mouth every 6 hours, As needed, For Temp greater than 38 C (100.4 F)  -- Indication: For Pain    aspirin 325 mg oral delayed release tablet  -- 1 tab(s) by mouth once a day  -- Indication: For cva    aluminum hydroxide-magnesium hydroxide 200 mg-200 mg/5 mL oral suspension  -- 30 milliliter(s) by mouth every 6 hours, As needed, Dyspepsia  -- Indication: For Dyspepsia    Keppra 1000 mg oral tablet  -- 1 tab(s) by mouth 2 times a day   -- Check with your doctor before becoming pregnant.  It is very important that you take or use this exactly as directed.  Do not skip doses or discontinue unless directed by your doctor.  May cause drowsiness or dizziness.  Obtain medical advice before taking any non-prescription drugs as some may affect the action of this medication.  Swallow whole.  Do not crush.  This drug may impair the ability to drive or operate machinery.  Use care until you become familiar with its effects.    -- Indication: For cva    metFORMIN 1000 mg oral tablet  -- 1 tab(s) by mouth 2 times a day  -- Indication: For Dm2    Crestor 20 mg oral tablet  -- 1 tab(s) by mouth once a day (at bedtime)  -- Indication: For cva    NIFEdipine 30 mg oral tablet, extended release  -- 1 tab(s) by mouth once a day  -- Indication: For Htn    simethicone 80 mg oral tablet, chewable  -- 1 tab(s) by mouth 4 times a day  -- Indication: For gas    melatonin 3 mg oral tablet  -- 1 tab(s) by mouth once a day (at bedtime)  -- Indication: For Sleep    pantoprazole 40 mg oral delayed release tablet  -- 1 tab(s) by mouth once a day (before a meal)  -- Indication: For gerd    folic acid 1 mg oral tablet  -- 1 tab(s) by mouth once a day  -- Indication: For vitamins I will START or STAY ON the medications listed below when I get home from the hospital:    acetaminophen 325 mg oral tablet  -- 2 tab(s) by mouth every 6 hours, As needed, For Temp greater than 38 C (100.4 F)  -- Indication: For Pain    aspirin 325 mg oral delayed release tablet  -- 1 tab(s) by mouth once a day  -- Indication: For cva    aluminum hydroxide-magnesium hydroxide 200 mg-200 mg/5 mL oral suspension  -- 30 milliliter(s) by mouth every 6 hours, As needed, Dyspepsia  -- Indication: For Dyspepsia    Keppra 1000 mg oral tablet  -- 1 tab(s) by mouth 2 times a day   -- Check with your doctor before becoming pregnant.  It is very important that you take or use this exactly as directed.  Do not skip doses or discontinue unless directed by your doctor.  May cause drowsiness or dizziness.  Obtain medical advice before taking any non-prescription drugs as some may affect the action of this medication.  Swallow whole.  Do not crush.  This drug may impair the ability to drive or operate machinery.  Use care until you become familiar with its effects.    -- Indication: For cva    metFORMIN 1000 mg oral tablet  -- 1 tab(s) by mouth 2 times a day  -- Indication: For Dm2    Crestor 20 mg oral tablet  -- 1 tab(s) by mouth once a day (at bedtime)  -- Indication: For cva    risperiDONE 0.25 mg oral tablet  -- 1 tab(s) by mouth 2 times a day  -- Indication: For Agitation    Xanax 0.25 mg oral tablet  -- 1 tab(s) by mouth 2 times a day, As Needed MDD:2  -- Avoid grapefruit and grapefruit juice while taking this medication.  Caution federal law prohibits the transfer of this drug to any person other  than the person for whom it was prescribed.  Do not take this drug if you are pregnant.  May cause drowsiness.  Alcohol may intensify this effect.  Use care when operating dangerous machinery.    -- Indication: For Agiation    NIFEdipine 30 mg oral tablet, extended release  -- 1 tab(s) by mouth once a day  -- Indication: For Htn    simethicone 80 mg oral tablet, chewable  -- 1 tab(s) by mouth 4 times a day  -- Indication: For gas    melatonin 3 mg oral tablet  -- 1 tab(s) by mouth once a day (at bedtime)  -- Indication: For Sleep    pantoprazole 40 mg oral delayed release tablet  -- 1 tab(s) by mouth once a day (before a meal)  -- Indication: For gerd    folic acid 1 mg oral tablet  -- 1 tab(s) by mouth once a day  -- Indication: For vitamins

## 2018-10-05 NOTE — DIETITIAN INITIAL EVALUATION ADULT. - OTHER INFO
Pt unable to state UBW thought per previous admission Pt stated having lost 10lbs, current wt 229lbs. Pt admit now with resistant Klebsiella pneumoniae infection, proteinuria present on CT scan. Pt with good appetite at this time consuming % of meals with intermittent total assist.

## 2018-10-05 NOTE — DIETITIAN INITIAL EVALUATION ADULT. - PROBLEM SELECTOR PLAN 1
pt. spike fever while on AVYCAZ, source unknown at this point, will get TTE first to r/o any source, if negative and still spiking fever may need TALON, will get lower ext. doppler r/o dvt as well.  lactate was 5.6 upon arrival and repeat is  0.7. pt. hemodynamically stable. will continue AVYCAZ for now and get ID consult.

## 2018-10-05 NOTE — DISCHARGE NOTE ADULT - SECONDARY DIAGNOSIS.
CVA (cerebral vascular accident) Hyperlipidemia, unspecified hyperlipidemia type Hypertension, unspecified type Tibial plateau fracture Type 2 diabetes mellitus with hyperglycemia, without long-term current use of insulin

## 2018-10-05 NOTE — PROGRESS NOTE ADULT - SUBJECTIVE AND OBJECTIVE BOX
[facility]  MD Ratna Moses MD  [phone]  Answering Sevosiris : [phone]    CARLOS [id number]    INTERVAL HPI/OVERNIGHT EVENTS:  reviewed work-up and results are non specific    Vital Signs Last 24 Hrs  T(C): 36.8 (05 Oct 2018 08:31), Max: 36.8 (05 Oct 2018 08:31)  T(F): 98.3 (05 Oct 2018 08:31), Max: 98.3 (05 Oct 2018 08:31)  HR: 74 (05 Oct 2018 08:31) (74 - 93)  BP: 148/71 (05 Oct 2018 08:31) (148/71 - 153/70)  BP(mean): --  RR: 18 (05 Oct 2018 08:31) (18 - 18)  SpO2: 96% (05 Oct 2018 08:31) (96% - 98%)  ANTIBIOTICS  cefTAZidime/avibactam IVPB      cefTAZidime/avibactam IVPB 2.5 Gram(s) IV Intermittent every 8 hours  nystatin    Suspension 407338 Unit(s) Oral four times a day      Allergies    No Known Allergies    Intolerances        REVIEW OF SYSTEMS:    CONSTITUTIONAL:  As per HPI.    HEENT:  Eyes:  No diplopia or blurred vision. ENT:  No earache, sore throat or runny nose.    CARDIOVASCULAR:  No pressure, squeezing, strangling, tightness, heaviness or aching about the chest, neck, axilla or epigastrium.    RESPIRATORY:  No cough, shortness of breath, PND or orthopnea.    GASTROINTESTINAL:  No nausea, vomiting or diarrhea.    GENITOURINARY:  No dysuria, frequency or urgency.    MUSCULOSKELETAL:  As per HPI.    SKIN:  No change in skin, hair or nails.    NEUROLOGIC:  CNI,MOTOR WNL, SENSORY WNL.    ENDOCRINE:  No heat or cold intolerance, polyuria or polydipsia.    HEMATOLOGICAL:  No easy bruising or bleeding.           PHYSICAL EXAMINATION:    GENERAL: The patient is a well-developed, well-nourished _____in no apparent distress. ___ is alert and oriented x3.    VITAL SIGNS:     HEENT: Head is normocephalic and atraumatic. Extraocular muscles are intact. Pupils are equal, round, and reactive to light and accommodation. Nares appeared normal. Mouth is well hydrated and without lesions. Mucous membranes are moist. Posterior pharynx clear of any exudate or lesions.    NECK: Supple. No carotid bruits.  No lymphadenopathy or thyromegaly.    LUNGS: Clear to auscultation.    HEART: Regular rate and rhythm without murmur.    ABDOMEN: Soft, nontender, and nondistended.  Positive bowel sounds.  No hepatosplenomegaly was noted.    EXTREMITIES: Without any cyanosis, clubbing, rash, lesions or edema.    NEUROLOGIC: Cranial nerves II through XII are grossly intact.    PSYCHIATRIC: Flat affect, but denies suicidal or homicidal ideations.  SKIN: No ulceration or induration present.      LABS:                        10.4   8.6   )-----------( 480      ( 05 Oct 2018 08:18 )             33.6     10-05    142  |  102  |  13.0  ----------------------------<  109  3.4<L>   |  27.0  |  0.85    Ca    9.4      05 Oct 2018 08:18    TPro  7.2  /  Alb  2.8<L>  /  TBili  0.4  /  DBili  x   /  AST  21  /  ALT  20  /  AlkPhos  78  10-05    Diagnosis:  Abnormal radiology        PLAN:  Discussed at [facility] with afsaneh caro on plans for a CT guided biopsy of the abnormal lesions before discharge and she agrees.  may discharge after and follow-up with us as outpatient.      Adolfo Douglas M.D.

## 2018-10-05 NOTE — CHART NOTE - NSCHARTNOTEFT_GEN_A_CORE
Patient scheduled for IR biopsy of pelvic lytic lesions on Monday. NPO after midnight on Sunday night, hold heparin morning of procedure, ASA discontinued. Call IR on Monday to confirm time of procedure. Please call afsaneh caro for consent, She is aware patient needs procedure and is agreeable.

## 2018-10-06 DIAGNOSIS — I10 ESSENTIAL (PRIMARY) HYPERTENSION: ICD-10-CM

## 2018-10-06 LAB
ALBUMIN SERPL ELPH-MCNC: 3 G/DL — LOW (ref 3.3–5.2)
ALP SERPL-CCNC: 76 U/L — SIGNIFICANT CHANGE UP (ref 40–120)
ALT FLD-CCNC: 19 U/L — SIGNIFICANT CHANGE UP
ANION GAP SERPL CALC-SCNC: 14 MMOL/L — SIGNIFICANT CHANGE UP (ref 5–17)
AST SERPL-CCNC: 17 U/L — SIGNIFICANT CHANGE UP
BILIRUB SERPL-MCNC: 0.3 MG/DL — LOW (ref 0.4–2)
BUN SERPL-MCNC: 12 MG/DL — SIGNIFICANT CHANGE UP (ref 8–20)
CALCIUM SERPL-MCNC: 9.3 MG/DL — SIGNIFICANT CHANGE UP (ref 8.6–10.2)
CHLORIDE SERPL-SCNC: 103 MMOL/L — SIGNIFICANT CHANGE UP (ref 98–107)
CO2 SERPL-SCNC: 27 MMOL/L — SIGNIFICANT CHANGE UP (ref 22–29)
CREAT SERPL-MCNC: 0.67 MG/DL — SIGNIFICANT CHANGE UP (ref 0.5–1.3)
CREATININE, URINE RESULT: 130 MG/DL — SIGNIFICANT CHANGE UP
CULTURE RESULTS: SIGNIFICANT CHANGE UP
CULTURE RESULTS: SIGNIFICANT CHANGE UP
GLUCOSE BLDC GLUCOMTR-MCNC: 128 MG/DL — HIGH (ref 70–99)
GLUCOSE BLDC GLUCOMTR-MCNC: 134 MG/DL — HIGH (ref 70–99)
GLUCOSE SERPL-MCNC: 120 MG/DL — HIGH (ref 70–115)
HCT VFR BLD CALC: 32.8 % — LOW (ref 42–52)
HGB BLD-MCNC: 10.1 G/DL — LOW (ref 14–18)
MCHC RBC-ENTMCNC: 27.8 PG — SIGNIFICANT CHANGE UP (ref 27–31)
MCHC RBC-ENTMCNC: 30.8 G/DL — LOW (ref 32–36)
MCV RBC AUTO: 90.4 FL — SIGNIFICANT CHANGE UP (ref 80–94)
PLATELET # BLD AUTO: 479 K/UL — HIGH (ref 150–400)
POTASSIUM SERPL-MCNC: 3.4 MMOL/L — LOW (ref 3.5–5.3)
POTASSIUM SERPL-SCNC: 3.4 MMOL/L — LOW (ref 3.5–5.3)
PROT ?TM UR-MCNC: 598 MG/DL — HIGH (ref 0–12)
PROT SERPL-MCNC: 7 G/DL — SIGNIFICANT CHANGE UP (ref 6.6–8.7)
RBC # BLD: 3.63 M/UL — LOW (ref 4.6–6.2)
RBC # FLD: 16.8 % — HIGH (ref 11–15.6)
SODIUM SERPL-SCNC: 144 MMOL/L — SIGNIFICANT CHANGE UP (ref 135–145)
SPECIMEN SOURCE: SIGNIFICANT CHANGE UP
SPECIMEN SOURCE: SIGNIFICANT CHANGE UP
WBC # BLD: 6.9 K/UL — SIGNIFICANT CHANGE UP (ref 4.8–10.8)
WBC # FLD AUTO: 6.9 K/UL — SIGNIFICANT CHANGE UP (ref 4.8–10.8)

## 2018-10-06 PROCEDURE — 99232 SBSQ HOSP IP/OBS MODERATE 35: CPT

## 2018-10-06 RX ADMIN — Medication 3 MILLIGRAM(S): at 21:46

## 2018-10-06 RX ADMIN — Medication 500000 UNIT(S): at 21:52

## 2018-10-06 RX ADMIN — Medication 1 MILLIGRAM(S): at 11:27

## 2018-10-06 RX ADMIN — SIMETHICONE 80 MILLIGRAM(S): 80 TABLET, CHEWABLE ORAL at 05:48

## 2018-10-06 RX ADMIN — SIMETHICONE 80 MILLIGRAM(S): 80 TABLET, CHEWABLE ORAL at 17:47

## 2018-10-06 RX ADMIN — Medication 250 MILLIGRAM(S): at 17:46

## 2018-10-06 RX ADMIN — Medication 250 MILLIGRAM(S): at 05:48

## 2018-10-06 RX ADMIN — SIMETHICONE 80 MILLIGRAM(S): 80 TABLET, CHEWABLE ORAL at 11:27

## 2018-10-06 RX ADMIN — Medication 1 MILLIGRAM(S): at 17:46

## 2018-10-06 RX ADMIN — Medication 30 MILLIGRAM(S): at 05:48

## 2018-10-06 RX ADMIN — HEPARIN SODIUM 5000 UNIT(S): 5000 INJECTION INTRAVENOUS; SUBCUTANEOUS at 17:47

## 2018-10-06 RX ADMIN — Medication 500000 UNIT(S): at 05:48

## 2018-10-06 RX ADMIN — ATORVASTATIN CALCIUM 20 MILLIGRAM(S): 80 TABLET, FILM COATED ORAL at 21:46

## 2018-10-06 RX ADMIN — Medication 500000 UNIT(S): at 11:27

## 2018-10-06 RX ADMIN — Medication 1 MILLIGRAM(S): at 08:30

## 2018-10-06 RX ADMIN — SIMETHICONE 80 MILLIGRAM(S): 80 TABLET, CHEWABLE ORAL at 01:00

## 2018-10-06 RX ADMIN — SIMETHICONE 80 MILLIGRAM(S): 80 TABLET, CHEWABLE ORAL at 21:46

## 2018-10-06 RX ADMIN — HEPARIN SODIUM 5000 UNIT(S): 5000 INJECTION INTRAVENOUS; SUBCUTANEOUS at 05:48

## 2018-10-06 RX ADMIN — PANTOPRAZOLE SODIUM 40 MILLIGRAM(S): 20 TABLET, DELAYED RELEASE ORAL at 05:49

## 2018-10-06 NOTE — PROGRESS NOTE ADULT - PROBLEM SELECTOR PLAN 1
Completes antibiotics today.
Cont ceftazidime/avibactam from prior admission for bacteremia. Infectious Disease consultation noted. Afebrile with resolved leukocytosis. Repeat blood culture results pending.

## 2018-10-06 NOTE — PROGRESS NOTE ADULT - PROBLEM SELECTOR PROBLEM 3
Sepsis due to Klebsiella pneumoniae
BOUCHRA (acute kidney injury)
Sepsis due to Klebsiella pneumoniae
Sepsis due to Klebsiella pneumoniae
Hyperlipidemia, unspecified hyperlipidemia type

## 2018-10-06 NOTE — PROGRESS NOTE ADULT - ASSESSMENT
68 yr old male with hypertension, diabetes mellitus, hyperlipidemia, tibial plateau fracture, recently discharged for Klebsiella sepsis on IV antibiotics was readmitted for worsening agitation noted by family. He was noted to be febrile and with leucocytosis, code sepsis was called. Imaging done this admission did not reveal new focus fo infection. ID was consulted, advised continuing Avycaz given Carbapenem resistant Klebsiella. Psychiatry was consulted, advised Haldol prn. CT imaging done during admission revealed lytic lesions, and given proteinuria, hematology was consulted, advised inpatient biopsy prior.

## 2018-10-06 NOTE — PROGRESS NOTE ADULT - PROBLEM SELECTOR PROBLEM 5
Diabetes mellitus of other type with complication
Agitation
Diabetes mellitus of other type with complication
Diabetes mellitus of other type with complication
Hypertension, unspecified type

## 2018-10-06 NOTE — PROGRESS NOTE ADULT - SUBJECTIVE AND OBJECTIVE BOX
INTERVAL HPI/OVERNIGHT EVENTS:    CC:    Chart and course reviewed. No overnight events, no new complaints    Vital Signs Last 24 Hrs  T(C): 37 (06 Oct 2018 15:40), Max: 37 (06 Oct 2018 00:13)  T(F): 98.6 (06 Oct 2018 15:40), Max: 98.6 (06 Oct 2018 00:13)  HR: 76 (06 Oct 2018 15:40) (64 - 76)  BP: 147/68 (06 Oct 2018 15:40) (120/69 - 158/79)  BP(mean): --  RR: 18 (06 Oct 2018 15:40) (18 - 18)  SpO2: 94% (06 Oct 2018 15:40) (94% - 96%)    PHYSICAL EXAM:    GENERAL: Not in distress, drowsy, easily arousable  CHEST/LUNG: b/l air entry  HEART: Regular   ABDOMEN: Soft, BS+  EXTREMITIES:  left LE cast, right no edema, tenderness.    MEDICATIONS  (STANDING):  atorvastatin 20 milliGRAM(s) Oral at bedtime  cefTAZidime/avibactam IVPB      cefTAZidime/avibactam IVPB 2.5 Gram(s) IV Intermittent every 8 hours  dextrose 5%. 1000 milliLiter(s) (50 mL/Hr) IV Continuous <Continuous>  dextrose 50% Injectable 12.5 Gram(s) IV Push once  dextrose 50% Injectable 25 Gram(s) IV Push once  dextrose 50% Injectable 25 Gram(s) IV Push once  folic acid 1 milliGRAM(s) Oral daily  heparin  Injectable 5000 Unit(s) SubCutaneous every 12 hours  insulin lispro (HumaLOG) corrective regimen sliding scale   SubCutaneous three times a day before meals  insulin lispro (HumaLOG) corrective regimen sliding scale   SubCutaneous at bedtime  melatonin 3 milliGRAM(s) Oral at bedtime  NIFEdipine XL 30 milliGRAM(s) Oral daily  nystatin    Suspension 899624 Unit(s) Oral four times a day  pantoprazole    Tablet 40 milliGRAM(s) Oral before breakfast  saccharomyces boulardii 250 milliGRAM(s) Oral two times a day  simethicone 80 milliGRAM(s) Chew four times a day    MEDICATIONS  (PRN):  acetaminophen   Tablet .. 650 milliGRAM(s) Oral every 6 hours PRN Temp greater or equal to 38C (100.4F)  aluminum hydroxide/magnesium hydroxide/simethicone Suspension 30 milliLiter(s) Oral every 6 hours PRN Dyspepsia  dextrose 40% Gel 15 Gram(s) Oral once PRN Blood Glucose LESS THAN 70 milliGRAM(s)/deciliter  glucagon  Injectable 1 milliGRAM(s) IntraMuscular once PRN Glucose LESS THAN 70 milligrams/deciliter  haloperidol    Injectable 2 milliGRAM(s) IntraMuscular every 6 hours PRN Aggitation  LORazepam   Injectable 1 milliGRAM(s) IV Push every 6 hours PRN Anxiety/agitation      Allergies    No Known Allergies    Intolerances          LABS:                          10.1   6.9   )-----------( 479      ( 06 Oct 2018 10:07 )             32.8     10-06    144  |  103  |  12.0  ----------------------------<  120<H>  3.4<L>   |  27.0  |  0.67    Ca    9.3      06 Oct 2018 10:07    TPro  7.0  /  Alb  3.0<L>  /  TBili  0.3<L>  /  DBili  x   /  AST  17  /  ALT  19  /  AlkPhos  76  10-06          RADIOLOGY & ADDITIONAL TESTS:

## 2018-10-06 NOTE — PROGRESS NOTE ADULT - PROBLEM SELECTOR PROBLEM 1
Carbapenem-resistant Klebsiella pneumoniae infection
Bacteremia
Carbapenem-resistant Klebsiella pneumoniae infection
Carbapenem-resistant Klebsiella pneumoniae infection
Sepsis

## 2018-10-06 NOTE — PROGRESS NOTE ADULT - PROBLEM SELECTOR PROBLEM 4
Pyelonephritis, acute
Tibial plateau fracture
Type 2 diabetes mellitus with hyperglycemia, without long-term current use of insulin

## 2018-10-06 NOTE — PROGRESS NOTE ADULT - PROBLEM SELECTOR PLAN 5
Episodes of agitation overnight resulting in the [place] brace being damaged. In separate incident pt pulled PICC line. Pt w history MR, unclear as to baseline. Cont Haldol as needed. Cont constant obs for safety. Psychiatric consult called.
Continue Nifedipine.

## 2018-10-06 NOTE — PROGRESS NOTE ADULT - PROBLEM SELECTOR PROBLEM 2
Acute febrile illness
Bacteremia
Lytic bone lesion of hip

## 2018-10-07 LAB
ANION GAP SERPL CALC-SCNC: 12 MMOL/L — SIGNIFICANT CHANGE UP (ref 5–17)
BUN SERPL-MCNC: 9 MG/DL — SIGNIFICANT CHANGE UP (ref 8–20)
CALCIUM SERPL-MCNC: 9.6 MG/DL — SIGNIFICANT CHANGE UP (ref 8.6–10.2)
CHLORIDE SERPL-SCNC: 99 MMOL/L — SIGNIFICANT CHANGE UP (ref 98–107)
CO2 SERPL-SCNC: 30 MMOL/L — HIGH (ref 22–29)
CREAT SERPL-MCNC: 0.7 MG/DL — SIGNIFICANT CHANGE UP (ref 0.5–1.3)
GLUCOSE BLDC GLUCOMTR-MCNC: 104 MG/DL — HIGH (ref 70–99)
GLUCOSE BLDC GLUCOMTR-MCNC: 107 MG/DL — HIGH (ref 70–99)
GLUCOSE BLDC GLUCOMTR-MCNC: 112 MG/DL — HIGH (ref 70–99)
GLUCOSE BLDC GLUCOMTR-MCNC: 141 MG/DL — HIGH (ref 70–99)
GLUCOSE BLDC GLUCOMTR-MCNC: 274 MG/DL — HIGH (ref 70–99)
GLUCOSE SERPL-MCNC: 103 MG/DL — SIGNIFICANT CHANGE UP (ref 70–115)
HCT VFR BLD CALC: 37.3 % — LOW (ref 42–52)
HGB BLD-MCNC: 11.3 G/DL — LOW (ref 14–18)
MCHC RBC-ENTMCNC: 27.6 PG — SIGNIFICANT CHANGE UP (ref 27–31)
MCHC RBC-ENTMCNC: 30.3 G/DL — LOW (ref 32–36)
MCV RBC AUTO: 91.2 FL — SIGNIFICANT CHANGE UP (ref 80–94)
PLATELET # BLD AUTO: 507 K/UL — HIGH (ref 150–400)
POTASSIUM SERPL-MCNC: 3.4 MMOL/L — LOW (ref 3.5–5.3)
POTASSIUM SERPL-SCNC: 3.4 MMOL/L — LOW (ref 3.5–5.3)
RBC # BLD: 4.09 M/UL — LOW (ref 4.6–6.2)
RBC # FLD: 16.6 % — HIGH (ref 11–15.6)
SODIUM SERPL-SCNC: 141 MMOL/L — SIGNIFICANT CHANGE UP (ref 135–145)
WBC # BLD: 8 K/UL — SIGNIFICANT CHANGE UP (ref 4.8–10.8)
WBC # FLD AUTO: 8 K/UL — SIGNIFICANT CHANGE UP (ref 4.8–10.8)

## 2018-10-07 PROCEDURE — 99232 SBSQ HOSP IP/OBS MODERATE 35: CPT

## 2018-10-07 RX ORDER — POTASSIUM CHLORIDE 20 MEQ
40 PACKET (EA) ORAL ONCE
Qty: 0 | Refills: 0 | Status: COMPLETED | OUTPATIENT
Start: 2018-10-07 | End: 2018-10-07

## 2018-10-07 RX ORDER — POTASSIUM CHLORIDE 20 MEQ
10 PACKET (EA) ORAL
Qty: 0 | Refills: 0 | Status: COMPLETED | OUTPATIENT
Start: 2018-10-07 | End: 2018-10-07

## 2018-10-07 RX ADMIN — HALOPERIDOL DECANOATE 2 MILLIGRAM(S): 100 INJECTION INTRAMUSCULAR at 10:34

## 2018-10-07 RX ADMIN — Medication 500000 UNIT(S): at 11:20

## 2018-10-07 RX ADMIN — ATORVASTATIN CALCIUM 20 MILLIGRAM(S): 80 TABLET, FILM COATED ORAL at 21:37

## 2018-10-07 RX ADMIN — Medication 2 MILLIGRAM(S): at 21:38

## 2018-10-07 RX ADMIN — SIMETHICONE 80 MILLIGRAM(S): 80 TABLET, CHEWABLE ORAL at 11:20

## 2018-10-07 RX ADMIN — SIMETHICONE 80 MILLIGRAM(S): 80 TABLET, CHEWABLE ORAL at 17:31

## 2018-10-07 RX ADMIN — Medication 500000 UNIT(S): at 05:36

## 2018-10-07 RX ADMIN — SIMETHICONE 80 MILLIGRAM(S): 80 TABLET, CHEWABLE ORAL at 05:35

## 2018-10-07 RX ADMIN — Medication 6: at 16:38

## 2018-10-07 RX ADMIN — Medication 100 MILLIEQUIVALENT(S): at 19:02

## 2018-10-07 RX ADMIN — SIMETHICONE 80 MILLIGRAM(S): 80 TABLET, CHEWABLE ORAL at 21:37

## 2018-10-07 RX ADMIN — Medication 250 MILLIGRAM(S): at 17:31

## 2018-10-07 RX ADMIN — Medication 1 MILLIGRAM(S): at 11:20

## 2018-10-07 RX ADMIN — Medication 100 MILLIEQUIVALENT(S): at 17:36

## 2018-10-07 RX ADMIN — Medication 250 MILLIGRAM(S): at 05:36

## 2018-10-07 RX ADMIN — Medication 100 MILLIEQUIVALENT(S): at 16:33

## 2018-10-07 RX ADMIN — HEPARIN SODIUM 5000 UNIT(S): 5000 INJECTION INTRAVENOUS; SUBCUTANEOUS at 05:36

## 2018-10-07 RX ADMIN — PANTOPRAZOLE SODIUM 40 MILLIGRAM(S): 20 TABLET, DELAYED RELEASE ORAL at 05:36

## 2018-10-07 RX ADMIN — Medication 1 MILLIGRAM(S): at 07:43

## 2018-10-07 RX ADMIN — Medication 2 MILLIGRAM(S): at 12:46

## 2018-10-07 RX ADMIN — Medication 3 MILLIGRAM(S): at 21:37

## 2018-10-07 RX ADMIN — Medication 1 MILLIGRAM(S): at 02:00

## 2018-10-07 RX ADMIN — Medication 500000 UNIT(S): at 17:31

## 2018-10-07 RX ADMIN — Medication 500000 UNIT(S): at 21:37

## 2018-10-07 RX ADMIN — Medication 30 MILLIGRAM(S): at 05:35

## 2018-10-07 NOTE — PROGRESS NOTE ADULT - SUBJECTIVE AND OBJECTIVE BOX
Patient seen and examined.  6 weeks s/p left tibial plateau fracture    PE: LLE  Knee ROM 10-90, non-tender       no calf tenderness, NVI    IMP: Left tibial plateau fracture, 6+ weeks post injury    PLAN:  continue non operative treatment  Patient must wear brace at all times (except for personal hygiene)  Strict non weight bearing to LLE  May start PT for ROM exercises to LEFT knee  Brace may be adjusted to allow 0-90 degrees of motion

## 2018-10-07 NOTE — PROGRESS NOTE ADULT - SUBJECTIVE AND OBJECTIVE BOX
YAMIL OLEA Male 68y [id number]    Patient is a 68y old  Male who presents with a chief complaint of sepsis (06 Oct 2018 15:48)      Subjective/objective:  Pt seen/ examined at bedside and charts reviewed, no over night event reported by night staff. Pt with poor mental status and ROS very limited, He is oriented to place- knows he is at [facility].     Review of system:  Very limited but no fever, chills.       PHYSICAL EXAM:    Vital Signs Last 24 Hrs  T(C): 36.7 (07 Oct 2018 07:40), Max: 37 (06 Oct 2018 15:40)  T(F): 98 (07 Oct 2018 07:40), Max: 98.6 (06 Oct 2018 15:40)  HR: 85 (07 Oct 2018 07:40) (76 - 86)  BP: 157/65 (07 Oct 2018 07:40) (143/70 - 157/65)  BP(mean): --  RR: 18 (07 Oct 2018 07:40) (15 - 18)  SpO2: 96% (07 Oct 2018 07:40) (94% - 96%)    GENERAL: Pt lying comfortably, NAD.  CHEST/LUNG: Clear to auscultation bilaterally; No wheezing.  HEART: S1S2+, Regular rate and rhythm; No murmurs.  ABDOMEN: Soft, Nontender, + obese, Bowel sounds present.  Extremities: Chronic skin changed b/ LE, LLE with brace.  NEURO: AAOX3, no focal deficits, no motor r sensory loss.  PSYCH: normal mood.          MEDICATIONS  (STANDING):  atorvastatin 20 milliGRAM(s) Oral at bedtime  dextrose 5%. 1000 milliLiter(s) (50 mL/Hr) IV Continuous <Continuous>  dextrose 50% Injectable 12.5 Gram(s) IV Push once  dextrose 50% Injectable 25 Gram(s) IV Push once  dextrose 50% Injectable 25 Gram(s) IV Push once  folic acid 1 milliGRAM(s) Oral daily  heparin  Injectable 5000 Unit(s) SubCutaneous every 12 hours  insulin lispro (HumaLOG) corrective regimen sliding scale   SubCutaneous three times a day before meals  insulin lispro (HumaLOG) corrective regimen sliding scale   SubCutaneous at bedtime  melatonin 3 milliGRAM(s) Oral at bedtime  NIFEdipine XL 30 milliGRAM(s) Oral daily  nystatin    Suspension 622042 Unit(s) Oral four times a day  pantoprazole    Tablet 40 milliGRAM(s) Oral before breakfast  saccharomyces boulardii 250 milliGRAM(s) Oral two times a day  simethicone 80 milliGRAM(s) Chew four times a day    MEDICATIONS  (PRN):  acetaminophen   Tablet .. 650 milliGRAM(s) Oral every 6 hours PRN Temp greater or equal to 38C (100.4F)  aluminum hydroxide/magnesium hydroxide/simethicone Suspension 30 milliLiter(s) Oral every 6 hours PRN Dyspepsia  dextrose 40% Gel 15 Gram(s) Oral once PRN Blood Glucose LESS THAN 70 milliGRAM(s)/deciliter  glucagon  Injectable 1 milliGRAM(s) IntraMuscular once PRN Glucose LESS THAN 70 milligrams/deciliter  haloperidol    Injectable 2 milliGRAM(s) IntraMuscular every 6 hours PRN Aggitation  LORazepam   Injectable 1 milliGRAM(s) IV Push every 6 hours PRN Anxiety/agitation        Labs:  LABS:                        10.1   6.9   )-----------( 479      ( 06 Oct 2018 10:07 )             32.8     10-06    144  |  103  |  12.0  ----------------------------<  120<H>  3.4<L>   |  27.0  |  0.67    Ca    9.3      06 Oct 2018 10:07    TPro  7.0  /  Alb  3.0<L>  /  TBili  0.3<L>  /  DBili  x   /  AST  17  /  ALT  19  /  AlkPhos  76  10-06        LIVER FUNCTIONS - ( 06 Oct 2018 10:07 )  Alb: 3.0 g/dL / Pro: 7.0 g/dL / ALK PHOS: 76 U/L / ALT: 19 U/L / AST: 17 U/L / GGT: x

## 2018-10-07 NOTE — PROGRESS NOTE ADULT - ASSESSMENT
68M with PMHx of HTN, DM, HLD, recent admission for a tibial plateau fracture and sepsis due to Klebsiella UTI/bacteremia was discharged on IV abx, presented to ED with worsening agitation and fever. On presentation, the patient was noted to be febrile(101.6), WBC(23.9). Urinalysis and respiratory panel were negative. CT of the head was without acute intracranial pathology. CT of the chest, abdomen, and pelvis noted lytic and sclerotic lesions in the pelvis but no acute pathology. The patient was continued on intravenous antibiotics which were started on the prior admission and was seen by Infectious Disease in consultation. Repeat laboratory studies the following day noted resolution of the leukocytosis. The patient developed agitation overnight 9/29 and the [place] brace was damaged and he took out his PICC line. Seen by psych for AMS and agitation, placed on haldol PRN. Given proteinuria and lytic lesions found on CT scan the patient was seen by hematology oncology for possible plasma cell dyscrasia work up. PT recommends home with home PT.        >Lytic bone lesion of hip-Hematology following, Plan for biopsy of abnormal lesion likely tomorrow- needs to be confirmed from IR. Will keep NPO midnight and hold DVT-P. ASA already held.  >Agitation /Anxiety- Pt calm today, c/w haldol /Ativan prn. Psych note reviewed.   >Bacteremia- Abx course completed.    >BOUCHRA- Resolved.   >Tibial Plateau fracture- C/w Brace. Ortho signed off, recommended outpatient f/u  >Hyperlipidemia- Continue statin Lipitor.  >DM-II- Monitor FS, LSS.    >HTN- C/w Nifedipine.  >DVT-P- Held today for tentative biopsy in AM. 68M with PMHx of HTN, DM, HLD, MR with baseline poor mental status, recent admission for a tibial plateau fracture and sepsis due to Klebsiella UTI/bacteremia was discharged on IV abx, presented to ED with worsening agitation and fever. On presentation, the patient was noted to be febrile(101.6), WBC(23.9). Urinalysis and respiratory panel were negative. CT of the head was without acute intracranial pathology. CT of the chest, abdomen, and pelvis noted lytic and sclerotic lesions in the pelvis but no acute pathology. The patient was continued on intravenous antibiotics which were started on the prior admission and was seen by Infectious Disease in consultation. Repeat laboratory studies the following day noted resolution of the leukocytosis. The patient developed agitation overnight 9/29 and the [place] brace was damaged and he took out his PICC line. Seen by psych for AMS and agitation, placed on haldol PRN. Given proteinuria and lytic lesions found on CT scan the patient was seen by hematology oncology for possible plasma cell dyscrasia work up. PT recommends home with home PT.        >Lytic bone lesion of hip-Hematology following, Plan for biopsy of abnormal lesion likely tomorrow- needs to be confirmed from IR. Will keep NPO midnight and hold DVT-P. ASA already held.  >Agitation /Anxiety- Pt calm today, c/w haldol /Ativan prn. Psych note reviewed.   >Bacteremia- Abx course completed.    >BOUCHRA- Resolved.   >Tibial Plateau fracture- C/w Brace. Ortho signed off, recommended outpatient f/u  >Hyperlipidemia- Continue statin Lipitor.  >DM-II- Monitor FS, LSS.    >HTN- C/w Nifedipine.  >DVT-P- Held today for tentative biopsy in AM.

## 2018-10-08 DIAGNOSIS — Z76.89 PERSONS ENCOUNTERING HEALTH SERVICES IN OTHER SPECIFIED CIRCUMSTANCES: ICD-10-CM

## 2018-10-08 LAB
% GAMMA, URINE: 12.9 % — SIGNIFICANT CHANGE UP
ALBUMIN 24H MFR UR ELPH: 69.4 % — SIGNIFICANT CHANGE UP
ALPHA1 GLOB 24H MFR UR ELPH: 7.8 % — SIGNIFICANT CHANGE UP
ALPHA2 GLOB 24H MFR UR ELPH: 3.9 % — SIGNIFICANT CHANGE UP
ANION GAP SERPL CALC-SCNC: 18 MMOL/L — HIGH (ref 5–17)
B-GLOBULIN 24H MFR UR ELPH: 6 % — SIGNIFICANT CHANGE UP
BUN SERPL-MCNC: 15 MG/DL — SIGNIFICANT CHANGE UP (ref 8–20)
CALCIUM SERPL-MCNC: 9.7 MG/DL — SIGNIFICANT CHANGE UP (ref 8.6–10.2)
CHLORIDE SERPL-SCNC: 101 MMOL/L — SIGNIFICANT CHANGE UP (ref 98–107)
CO2 SERPL-SCNC: 25 MMOL/L — SIGNIFICANT CHANGE UP (ref 22–29)
COLLECT DURATION TIME UR: 24 HR — SIGNIFICANT CHANGE UP
CREAT SERPL-MCNC: 0.84 MG/DL — SIGNIFICANT CHANGE UP (ref 0.5–1.3)
GLUCOSE BLDC GLUCOMTR-MCNC: 104 MG/DL — HIGH (ref 70–99)
GLUCOSE BLDC GLUCOMTR-MCNC: 104 MG/DL — HIGH (ref 70–99)
GLUCOSE BLDC GLUCOMTR-MCNC: 109 MG/DL — HIGH (ref 70–99)
GLUCOSE BLDC GLUCOMTR-MCNC: 123 MG/DL — HIGH (ref 70–99)
GLUCOSE BLDC GLUCOMTR-MCNC: 147 MG/DL — HIGH (ref 70–99)
GLUCOSE BLDC GLUCOMTR-MCNC: 95 MG/DL — SIGNIFICANT CHANGE UP (ref 70–99)
GLUCOSE SERPL-MCNC: 94 MG/DL — SIGNIFICANT CHANGE UP (ref 70–115)
HCT VFR BLD CALC: 37.8 % — LOW (ref 42–52)
HGB BLD-MCNC: 11.5 G/DL — LOW (ref 14–18)
INTERPRETATION 24H UR IFE-IMP: SIGNIFICANT CHANGE UP
M PROTEIN 24H UR ELPH-MRATE: 0 MG/24HR — SIGNIFICANT CHANGE UP (ref 0–0)
M PROTEIN 24H UR ELPH-MRATE: 0 MG/DL — SIGNIFICANT CHANGE UP
MCHC RBC-ENTMCNC: 27.5 PG — SIGNIFICANT CHANGE UP (ref 27–31)
MCHC RBC-ENTMCNC: 30.4 G/DL — LOW (ref 32–36)
MCV RBC AUTO: 90.4 FL — SIGNIFICANT CHANGE UP (ref 80–94)
PLATELET # BLD AUTO: 513 K/UL — HIGH (ref 150–400)
POTASSIUM SERPL-MCNC: 3.8 MMOL/L — SIGNIFICANT CHANGE UP (ref 3.5–5.3)
POTASSIUM SERPL-SCNC: 3.8 MMOL/L — SIGNIFICANT CHANGE UP (ref 3.5–5.3)
PROT ?TM UR-MCNC: 598 MG/DL — HIGH (ref 0–12)
PROT PATTERN 24H UR ELPH-IMP: SIGNIFICANT CHANGE UP
PROTEIN QUANT CALC, URINE: 5830 MG/24 H — HIGH (ref 50–100)
RBC # BLD: 4.18 M/UL — LOW (ref 4.6–6.2)
RBC # FLD: 16.5 % — HIGH (ref 11–15.6)
SODIUM SERPL-SCNC: 144 MMOL/L — SIGNIFICANT CHANGE UP (ref 135–145)
TOTAL VOLUME - 24 HOUR: 975 ML — SIGNIFICANT CHANGE UP
URINE CREATININE CALCULATION: 1.3 G/24 H — SIGNIFICANT CHANGE UP (ref 1–2)
WBC # BLD: 7.9 K/UL — SIGNIFICANT CHANGE UP (ref 4.8–10.8)
WBC # FLD AUTO: 7.9 K/UL — SIGNIFICANT CHANGE UP (ref 4.8–10.8)

## 2018-10-08 PROCEDURE — 99232 SBSQ HOSP IP/OBS MODERATE 35: CPT

## 2018-10-08 RX ADMIN — SIMETHICONE 80 MILLIGRAM(S): 80 TABLET, CHEWABLE ORAL at 21:52

## 2018-10-08 RX ADMIN — Medication 250 MILLIGRAM(S): at 17:33

## 2018-10-08 RX ADMIN — HALOPERIDOL DECANOATE 2 MILLIGRAM(S): 100 INJECTION INTRAMUSCULAR at 09:48

## 2018-10-08 RX ADMIN — Medication 500000 UNIT(S): at 17:33

## 2018-10-08 RX ADMIN — Medication 500000 UNIT(S): at 21:52

## 2018-10-08 RX ADMIN — Medication 2 MILLIGRAM(S): at 08:41

## 2018-10-08 RX ADMIN — ATORVASTATIN CALCIUM 20 MILLIGRAM(S): 80 TABLET, FILM COATED ORAL at 21:52

## 2018-10-08 RX ADMIN — SIMETHICONE 80 MILLIGRAM(S): 80 TABLET, CHEWABLE ORAL at 17:33

## 2018-10-08 RX ADMIN — Medication 1 MILLIGRAM(S): at 17:33

## 2018-10-08 RX ADMIN — Medication 3 MILLIGRAM(S): at 21:52

## 2018-10-08 NOTE — PROGRESS NOTE ADULT - SUBJECTIVE AND OBJECTIVE BOX
YAMIL OLEA Patient is a 68y old  Male who presents with a chief complaint of sepsis (07 Oct 2018 14:11)     HPI:  67 y/o male with DM, hyperlipidemia, anemia and  MR as per history was discharged on 9/27/18 from [facility] after being diagnosed with Klebsiella pneumoniae sepsis and was sent home with PICC line and AVYCAZ till 10/1/18 was brought back to ER by family as he was somewhat more agitated than his baseline. When pt. arrived to ER he had fever of 101.6, code sepsis was called, pt's wbc on this vist were 23.9 and were within normal range prior to discharge on 9/27/18. As per history there is no cough or  n/v/d. pt. does not contribute to history. pt's ct chest abdomen and pelvis was done in ER and did not show any focus of infection. ct head did not show acute findings as well. no other complaints at the time of admission. pt. got ativan in the ER and is resting comfortably.  As per record pt. was seen by ortho service on august 25th for left tibial plateau fracture and brace and NWB LLE was recommended and out- pt. ortho follow up. (29 Sep 2018 01:11)    HPI 10/8/18:    Patient seen and examined at the bedside along with his niece Aminah. He appears in NAD and sitting in the bed comfortably. His niece states he is at his baseline mental status. He becomes agitated because he is in an unfamiliar environment with unfamiliar faces. He denies fevers, chills, N/V, diarrhea, constipation, CP, SOB.     I&O's Summary    Allergies    No Known Allergies    Intolerances      HEALTH ISSUES - PROBLEM Dx:  Hypertension, unspecified type: Hypertension, unspecified type  Intellectual disability  Adjustment disorder with mixed disturbance of emotions and conduct  Acute febrile illness: Acute febrile illness  Prophylactic measure: Prophylactic measure  Lytic bone lesion of hip: Lytic bone lesion of hip  Diabetes mellitus of other type with complication: Diabetes mellitus of other type with complication  Agitation: Agitation  Tibial plateau fracture: Tibial plateau fracture  Bacteremia: Bacteremia  Sepsis: Sepsis  BOUCHRA (acute kidney injury): BOUCHRA (acute kidney injury)  Hyperlipidemia, unspecified hyperlipidemia type: Hyperlipidemia, unspecified hyperlipidemia type  Type 2 diabetes mellitus with hyperglycemia, without long-term current use of insulin: Type 2 diabetes mellitus with hyperglycemia, without long-term current use of insulin  Sepsis, due to unspecified organism: Sepsis, due to unspecified organism        PAST MEDICAL & SURGICAL HISTORY:  Anemia, unspecified type  Hyperlipidemia, unspecified hyperlipidemia type  Diabetes mellitus of other type with complication  Hypertension, unspecified type  No significant past surgical history          Vital Signs Last 24 Hrs  T(C): 37.2 (08 Oct 2018 09:30), Max: 37.2 (08 Oct 2018 09:30)  T(F): 98.9 (08 Oct 2018 09:30), Max: 98.9 (08 Oct 2018 09:30)  HR: 122 (08 Oct 2018 09:30) (86 - 122)  BP: 155/80 (08 Oct 2018 09:30) (104/53 - 157/75)  RR: 18 (08 Oct 2018 09:30) (18 - 18)  SpO2: 97% (08 Oct 2018 09:30) (95% - 98%)    PHYSICAL EXAM:    GENERAL: NAD  HEAD:  Atraumatic, Normocephalic  EYES: EOMI, PERRLA, conjunctiva and sclera clear  ENMT:  Moist mucous membranes,  No lesions  NERVOUS SYSTEM:  Alert & Oriented, Moves upper and lower extremities  CHEST/LUNG: Clear to auscultation bilaterally; No rales, rhonchi, wheezing,   HEART: Regular rate and rhythm; No murmurs,   ABDOMEN: Soft, Nontender, Nondistended; Bowel sounds present  EXTREMITIES:  no LE edema, left leg brace  SKIN: Skin tear left elbow, clean, dry    acetaminophen   Tablet .. 650 milliGRAM(s) Oral every 6 hours PRN  aluminum hydroxide/magnesium hydroxide/simethicone Suspension 30 milliLiter(s) Oral every 6 hours PRN  atorvastatin 20 milliGRAM(s) Oral at bedtime  dextrose 40% Gel 15 Gram(s) Oral once PRN  dextrose 5%. 1000 milliLiter(s) IV Continuous <Continuous>  dextrose 50% Injectable 12.5 Gram(s) IV Push once  dextrose 50% Injectable 25 Gram(s) IV Push once  dextrose 50% Injectable 25 Gram(s) IV Push once  folic acid 1 milliGRAM(s) Oral daily  glucagon  Injectable 1 milliGRAM(s) IntraMuscular once PRN  haloperidol    Injectable 2 milliGRAM(s) IntraMuscular every 6 hours PRN  insulin lispro (HumaLOG) corrective regimen sliding scale   SubCutaneous three times a day before meals  insulin lispro (HumaLOG) corrective regimen sliding scale   SubCutaneous at bedtime  LORazepam   Injectable 2 milliGRAM(s) IV Push every 6 hours PRN  melatonin 3 milliGRAM(s) Oral at bedtime  NIFEdipine XL 30 milliGRAM(s) Oral daily  nystatin    Suspension [id number] Unit(s) Oral four times a day  pantoprazole    Tablet 40 milliGRAM(s) Oral before breakfast  saccharomyces boulardii 250 milliGRAM(s) Oral two times a day  simethicone 80 milliGRAM(s) Chew four times a day      LABS:                          11.5   7.9   )-----------( 513      ( 08 Oct 2018 12:35 )             37.8     10-08    144  |  101  |  15.0  ----------------------------<  94  3.8   |  25.0  |  0.84    Ca    9.7      08 Oct 2018 12:35        CAPILLARY BLOOD GLUCOSE      POCT Blood Glucose.: 95 mg/dL (08 Oct 2018 11:33)  POCT Blood Glucose.: 109 mg/dL (08 Oct 2018 07:59)  POCT Blood Glucose.: 104 mg/dL (07 Oct 2018 21:05)  POCT Blood Glucose.: 274 mg/dL (07 Oct 2018 16:32)      Consultant notes reviewed    Case discussed with consultant/provider/ family /patient

## 2018-10-08 NOTE — PROGRESS NOTE ADULT - ASSESSMENT
68M with PMHx of HTN, DM, HLD, MR with baseline poor mental status, recent admission for a tibial plateau fracture and sepsis due to Klebsiella UTI/bacteremia was discharged on IV abx, presented to ED with worsening agitation and fever. On presentation, the patient was noted to be febrile(101.6), WBC(23.9). Urinalysis and respiratory panel were negative. CT of the head was without acute intracranial pathology. CT of the chest, abdomen, and pelvis noted lytic and sclerotic lesions in the pelvis but no acute pathology. The patient was continued on intravenous antibiotics which were started on the prior admission and was seen by Infectious Disease in consultation. Repeat laboratory studies the following day noted resolution of the leukocytosis. The patient developed agitation overnight 9/29 and the [place] brace was damaged and he took out his PICC line. Seen by psych for AMS and agitation, placed on haldol PRN. Given proteinuria and lytic lesions found on CT scan the patient was seen by hematology oncology for possible plasma cell dyscrasia work up. PT recommends home with home PT. Biopsy planned for 10/8 postponed till 10/9 @ 11am, plan for dc tomorrow after the procedure either to home, or [facility] if insurance will cover.         >Lytic bone lesion of hip-Hematology following, Plan for biopsy of abnormal lesion likely tomorrow 10/9, postponed today. Will keep NPO midnight and hold DVT-P. ASA already held. Check PT/INR in AM   >Agitation /Anxiety- Pt calm today, c/w haldol /Ativan prn. Psych note reviewed.   >Bacteremia- Abx course completed.    >BOUCHRA- Resolved.   >Tibial Plateau fracture- C/w Brace. Ortho signed off, recommended outpatient f/u. NWB left LE, brace at all times. Needs home PT or [facility]  >Hyperlipidemia- Continue statin Lipitor.  >DM-II- Monitor FS, LSS.    >HTN- C/w Nifedipine.  >DVT-P- Held today for tentative biopsy in AM.    Dipo: Discharge tomorrow after planned biopsy procedure, he will be able to go home or [facility] if insurance will cover it. 68M with PMHx of HTN, DM, HLD, MR with baseline poor mental status, recent admission for a tibial plateau fracture and sepsis due to Klebsiella UTI/bacteremia was discharged on IV abx, presented to ED with worsening agitation and fever. On presentation, the patient was noted to be febrile(101.6), WBC(23.9). Urinalysis and respiratory panel were negative. CT of the head was without acute intracranial pathology. CT of the chest, abdomen, and pelvis noted lytic and sclerotic lesions in the pelvis but no acute pathology. The patient was continued on intravenous antibiotics which were started on the prior admission and was seen by Infectious Disease in consultation. Repeat laboratory studies the following day noted resolution of the leukocytosis. The patient developed agitation overnight 9/29 and the [place] brace was damaged and he took out his PICC line. Seen by psych for AMS and agitation, placed on haldol PRN. Given proteinuria and lytic lesions found on CT scan the patient was seen by hematology oncology for possible plasma cell dyscrasia work up. Biopsy planned for 10/8 postponed till 10/9 @ 11am per IR, plan for dc tomorrow after the procedure either to home, or [facility] if insurance will cover.  PT recommends home with home PT.    Plan:    >Lytic bone lesion of hip- Hematology following, Plan for biopsy of abnormal lesion likely tomorrow 10/9, postponed today by IR. Will keep NPO midnight and hold DVT-P. ASA already held. Check PT/ INR in AM.   >Agitation /Anxiety- Pt calm today, c/w haldol /Ativan prn. Psych note reviewed.   >Bacteremia- Abx course completed.    >BOUCHRA- Resolved.   >Tibial Plateau fracture- C/w Brace. Ortho signed off, recommended outpatient f/u. NWB left LE, brace at all times. Needs home PT or [facility]  >HLD- Continue statin Lipitor.  >DM-II- Monitor FS, LSS.    >HTN- C/w Nifedipine.  >DVT-P- Held for tentative biopsy in AM.    Dipo: Discharge tomorrow after planned biopsy procedure, he will be able to go home or [facility] if insurance will cover it.

## 2018-10-08 NOTE — PROGRESS NOTE ADULT - ATTENDING COMMENTS
68M with a recent admission for a tibial plateau fracture and sepsis due to Klebsiella UTI/bacteremia presented with agitation and fever. On presentation, the patient was noted to be febrile(101.6), WBC(23.9). Urinalysis and respiratory panel were negative. CT of the head was without acute intracranial pathology. CT of the chest, abdomen, and pelvis noted lytic and sclerotic lesions in the pelvis but no acute pathology. The patient was continued on intravenous antibiotics which were started on the prior admission and was seen by Infectious Disease in consultation. Repeat laboratory studies the following day noted resolution of the leukocytosis. The patient developed agitation overnight 9/29 and the [place] brace was damaged. Pt continues to become agitated at night past two nights and took out his PICC line last night. Seen by psych for AMS and agitation, placed on haldol PRN. Given proteinuria and lytic lesions found on CT scan the patient was seen by hematology/oncolocy for plasma cell dyscrasia work up. PT recommends home with home PT.     today he c/o sore thorat and cough, sob on lying down. on oral exam unable to see uvula and white discolration on tongue and dry tongue    cxr done which doesn't show any acute changes  magic mouth wash and nystain ordered,   strep throat ordered  ID note appreciated  needs pscy follow up continue haldol and ativan prn  melatonin 3 mg ordered  enhance supervision today, discussed with rn and cm
68M with a recent admission for a tibial plateau fracture and sepsis due to Klebsiella UTI/bacteremia presented with agitation and fever. On presentation, the patient was noted to be febrile(101.6), WBC(23.9). Urinalysis and respiratory panel were negative. CT of the head was without acute intracranial pathology. CT of the chest, abdomen, and pelvis noted lytic and sclerotic lesions in the pelvis but no acute pathology. The patient was continued on intravenous antibiotics and ID recommended Avycaz till 10/6.    Patient is very anxious to hear any new. spoke to patient neice and update her the plan and hematolgy input. all concerns and questions answered.  Hematology consulted for lytic lesion on pelvis. Work up sent. As per hematolgy CT guided biopsy on monday by IR before discharge.  PT recommended home with home pt.   The patient developed agitation overnight 9/29 and the [place] brace was damaged and he took out his PICC line. Seen by psych for AMS and agitation, placed on haldol PRN.   dc planning on monday after biopsy
I have seen and examined the patient with PA and agree with the above assessment and plan. Pt mental status at baseline per niece. He is awaiting biopsy of lytic lesion tomorrow.
anticipate discharge to facility
anticipate discharge to facility
Discussed with RN.
-68M with a recent admission for a tibial plateau fracture and sepsis due to Klebsiella UTI/bacteremia presented with agitation. On presentation, the patient was noted to be febrile(101.6), WBC(23.9). Urinalysis and respiratory panel were negative. CT of the head was without acute intracranial pathology. CT of the chest, abdomen, and pelvis noted lytic and sclerotic lesions in the pelvis but no acute pathology. The patient developed agitation overnight 9/29 and the [place] brace was damaged. Pt continues to become agitated at night past two nights and took out his PICC line last night.     plan [facility] eval for agitation. currently calm and mentioned he was agitated as no one from family members came yesterday. continue 1:1 for now.  hematology evaluation for lytic lesion in pelvis on ct scan   f/u blood culture and id evaluation for finalizing ab  picc line once above issues addressed  PT evaluation  orthopedic to brace as he broke one earlier.  plan discussed with patient, rn, patient health care proxy

## 2018-10-09 LAB
ALBUMIN SERPL ELPH-MCNC: 3.1 G/DL — LOW (ref 3.3–5.2)
ALP SERPL-CCNC: 78 U/L — SIGNIFICANT CHANGE UP (ref 40–120)
ALT FLD-CCNC: 13 U/L — SIGNIFICANT CHANGE UP
ANION GAP SERPL CALC-SCNC: 15 MMOL/L — SIGNIFICANT CHANGE UP (ref 5–17)
AST SERPL-CCNC: 17 U/L — SIGNIFICANT CHANGE UP
BILIRUB SERPL-MCNC: 0.4 MG/DL — SIGNIFICANT CHANGE UP (ref 0.4–2)
BUN SERPL-MCNC: 16 MG/DL — SIGNIFICANT CHANGE UP (ref 8–20)
CALCIUM SERPL-MCNC: 9.8 MG/DL — SIGNIFICANT CHANGE UP (ref 8.6–10.2)
CHLORIDE SERPL-SCNC: 100 MMOL/L — SIGNIFICANT CHANGE UP (ref 98–107)
CO2 SERPL-SCNC: 27 MMOL/L — SIGNIFICANT CHANGE UP (ref 22–29)
CREAT SERPL-MCNC: 0.89 MG/DL — SIGNIFICANT CHANGE UP (ref 0.5–1.3)
GLUCOSE BLDC GLUCOMTR-MCNC: 111 MG/DL — HIGH (ref 70–99)
GLUCOSE BLDC GLUCOMTR-MCNC: 116 MG/DL — HIGH (ref 70–99)
GLUCOSE BLDC GLUCOMTR-MCNC: 126 MG/DL — HIGH (ref 70–99)
GLUCOSE BLDC GLUCOMTR-MCNC: 126 MG/DL — HIGH (ref 70–99)
GLUCOSE BLDC GLUCOMTR-MCNC: 128 MG/DL — HIGH (ref 70–99)
GLUCOSE SERPL-MCNC: 122 MG/DL — HIGH (ref 70–115)
HCT VFR BLD CALC: 38.7 % — LOW (ref 42–52)
HGB BLD-MCNC: 11.8 G/DL — LOW (ref 14–18)
INR BLD: 1.13 RATIO — SIGNIFICANT CHANGE UP (ref 0.88–1.16)
KAPPA LC 24H UR-MCNC: 29.7 MG/DL — HIGH
KAPPA LC 24H UR-MRATE: 289.58 MG/24 H — SIGNIFICANT CHANGE UP
LACTATE BLDV-MCNC: 0.7 MMOL/L — SIGNIFICANT CHANGE UP (ref 0.5–2)
LAMBDA LC 24H UR-MCNC: 17.1 MG/DL — HIGH
LAMBDA LC 24H UR-MRATE: 166.73 MG/24 H — SIGNIFICANT CHANGE UP
MCHC RBC-ENTMCNC: 27.8 PG — SIGNIFICANT CHANGE UP (ref 27–31)
MCHC RBC-ENTMCNC: 30.5 G/DL — LOW (ref 32–36)
MCV RBC AUTO: 91.1 FL — SIGNIFICANT CHANGE UP (ref 80–94)
PLATELET # BLD AUTO: 508 K/UL — HIGH (ref 150–400)
POTASSIUM SERPL-MCNC: 3.9 MMOL/L — SIGNIFICANT CHANGE UP (ref 3.5–5.3)
POTASSIUM SERPL-SCNC: 3.9 MMOL/L — SIGNIFICANT CHANGE UP (ref 3.5–5.3)
PROCALCITONIN SERPL-MCNC: 0.6 NG/ML — HIGH (ref 0–0.04)
PROT SERPL-MCNC: 7.5 G/DL — SIGNIFICANT CHANGE UP (ref 6.6–8.7)
PROTHROM AB SERPL-ACNC: 12.5 SEC — SIGNIFICANT CHANGE UP (ref 9.8–12.7)
RBC # BLD: 4.25 M/UL — LOW (ref 4.6–6.2)
RBC # FLD: 16.5 % — HIGH (ref 11–15.6)
SODIUM SERPL-SCNC: 142 MMOL/L — SIGNIFICANT CHANGE UP (ref 135–145)
SPECIMEN VOL 24H UR: 975 ML/24 H — SIGNIFICANT CHANGE UP
TROPONIN T SERPL-MCNC: <0.01 NG/ML — SIGNIFICANT CHANGE UP (ref 0–0.06)
WBC # BLD: 10.5 K/UL — SIGNIFICANT CHANGE UP (ref 4.8–10.8)
WBC # FLD AUTO: 10.5 K/UL — SIGNIFICANT CHANGE UP (ref 4.8–10.8)

## 2018-10-09 PROCEDURE — 95819 EEG AWAKE AND ASLEEP: CPT | Mod: 26

## 2018-10-09 PROCEDURE — 93010 ELECTROCARDIOGRAM REPORT: CPT

## 2018-10-09 PROCEDURE — 99233 SBSQ HOSP IP/OBS HIGH 50: CPT

## 2018-10-09 PROCEDURE — 99223 1ST HOSP IP/OBS HIGH 75: CPT

## 2018-10-09 PROCEDURE — 70450 CT HEAD/BRAIN W/O DYE: CPT | Mod: 26

## 2018-10-09 RX ORDER — ACETAMINOPHEN 500 MG
650 TABLET ORAL ONCE
Qty: 0 | Refills: 0 | Status: COMPLETED | OUTPATIENT
Start: 2018-10-09 | End: 2018-10-09

## 2018-10-09 RX ORDER — LEVETIRACETAM 250 MG/1
1000 TABLET, FILM COATED ORAL EVERY 12 HOURS
Qty: 0 | Refills: 0 | Status: DISCONTINUED | OUTPATIENT
Start: 2018-10-09 | End: 2018-10-17

## 2018-10-09 RX ORDER — SODIUM CHLORIDE 9 MG/ML
1000 INJECTION INTRAMUSCULAR; INTRAVENOUS; SUBCUTANEOUS
Qty: 0 | Refills: 0 | Status: DISCONTINUED | OUTPATIENT
Start: 2018-10-09 | End: 2018-10-09

## 2018-10-09 RX ORDER — HYDRALAZINE HCL 50 MG
10 TABLET ORAL ONCE
Qty: 0 | Refills: 0 | Status: COMPLETED | OUTPATIENT
Start: 2018-10-09 | End: 2018-10-09

## 2018-10-09 RX ORDER — ASPIRIN/CALCIUM CARB/MAGNESIUM 324 MG
300 TABLET ORAL DAILY
Qty: 0 | Refills: 0 | Status: DISCONTINUED | OUTPATIENT
Start: 2018-10-09 | End: 2018-10-15

## 2018-10-09 RX ORDER — METOPROLOL TARTRATE 50 MG
5 TABLET ORAL EVERY 6 HOURS
Qty: 0 | Refills: 0 | Status: DISCONTINUED | OUTPATIENT
Start: 2018-10-09 | End: 2018-10-17

## 2018-10-09 RX ORDER — HEPARIN SODIUM 5000 [USP'U]/ML
5000 INJECTION INTRAVENOUS; SUBCUTANEOUS EVERY 12 HOURS
Qty: 0 | Refills: 0 | Status: DISCONTINUED | OUTPATIENT
Start: 2018-10-09 | End: 2018-10-15

## 2018-10-09 RX ORDER — SODIUM CHLORIDE 9 MG/ML
1000 INJECTION INTRAMUSCULAR; INTRAVENOUS; SUBCUTANEOUS
Qty: 0 | Refills: 0 | Status: DISCONTINUED | OUTPATIENT
Start: 2018-10-09 | End: 2018-10-10

## 2018-10-09 RX ORDER — SODIUM CHLORIDE 9 MG/ML
500 INJECTION INTRAMUSCULAR; INTRAVENOUS; SUBCUTANEOUS ONCE
Qty: 0 | Refills: 0 | Status: DISCONTINUED | OUTPATIENT
Start: 2018-10-09 | End: 2018-10-09

## 2018-10-09 RX ADMIN — HEPARIN SODIUM 5000 UNIT(S): 5000 INJECTION INTRAVENOUS; SUBCUTANEOUS at 18:04

## 2018-10-09 RX ADMIN — Medication 1 MILLIGRAM(S): at 15:40

## 2018-10-09 RX ADMIN — Medication 650 MILLIGRAM(S): at 13:31

## 2018-10-09 RX ADMIN — SODIUM CHLORIDE 100 MILLILITER(S): 9 INJECTION INTRAMUSCULAR; INTRAVENOUS; SUBCUTANEOUS at 16:45

## 2018-10-09 RX ADMIN — Medication 650 MILLIGRAM(S): at 14:55

## 2018-10-09 RX ADMIN — Medication 10 MILLIGRAM(S): at 13:31

## 2018-10-09 RX ADMIN — Medication 300 MILLIGRAM(S): at 16:45

## 2018-10-09 RX ADMIN — Medication 2 MILLIGRAM(S): at 12:19

## 2018-10-09 RX ADMIN — LEVETIRACETAM 400 MILLIGRAM(S): 250 TABLET, FILM COATED ORAL at 16:43

## 2018-10-09 RX ADMIN — Medication 1 MILLIGRAM(S): at 15:20

## 2018-10-09 NOTE — PROGRESS NOTE ADULT - ASSESSMENT
1) Seziures--> neuro consulted  --> will start keppra bid 1000 mg 1st dose now  --> eeg, ct head    2) low grade fever --> will start sepsis workup  --> no abx for now    3) Lytic bone lesion of hip- Hematology following,  --> IR BIOPSY CANCELLED    4) Agitation /Anxiety- Pt calm today, c/w haldol /Ativan prn. Psych note reviewed.     5) OBUCHRA- Resolved.     6) Tibial Plateau fracture- C/w Brace. Ortho signed off, recommended outpatient f/u. NWB left LE, brace at all times. Needs home PT or NOAM    7) HLD- Continue statin Lipitor.    8) DM-II- Monitor FS, LSS.      9) HTN- C/w Nifedipine.    10)DVT-P- lovenox

## 2018-10-09 NOTE — CONSULT NOTE ADULT - SUBJECTIVE AND OBJECTIVE BOX
[place] [facility], THE [facility]                                   [address] [place] [address]                                                      PHONE: [phone]                                                         FAX: [phone]  [email or web address]  ---------------------------------------------------------------------------------------------------------------------------------    Reason for Consult: Seizure  [facility]: [place]  HPI:  YAMIL OLEA is an 68y Male PMHx HTN, HLD, DM, recent klebsiella PNA and ABx via PICC line, aw fevers and lethargy.  He was found to have lytic lesions of hte hip and when about to have an IR biopsy he was noted to have a seizure  (staring off in one direction and shaking of the upper extremities.) Patient was also noted to be febrile.  Pt has mental retardation and is nonverbal at baseline.    presenting symptoms are progressive, severe and constant  PAST MEDICAL & SURGICAL HISTORY:  Anemia, unspecified type  Hyperlipidemia, unspecified hyperlipidemia type  Diabetes mellitus of other type with complication  Hypertension, unspecified type  No significant past surgical history      No Known Allergies      MEDICATIONS  (STANDING):  aspirin Suppository 300 milliGRAM(s) Rectal daily  atorvastatin 20 milliGRAM(s) Oral at bedtime  dextrose 5%. 1000 milliLiter(s) (50 mL/Hr) IV Continuous <Continuous>  dextrose 50% Injectable 12.5 Gram(s) IV Push once  dextrose 50% Injectable 25 Gram(s) IV Push once  dextrose 50% Injectable 25 Gram(s) IV Push once  folic acid 1 milliGRAM(s) Oral daily  heparin  Injectable 5000 Unit(s) SubCutaneous every 12 hours  insulin lispro (HumaLOG) corrective regimen sliding scale   SubCutaneous three times a day before meals  insulin lispro (HumaLOG) corrective regimen sliding scale   SubCutaneous at bedtime  levETIRAcetam  IVPB 1000 milliGRAM(s) IV Intermittent every 12 hours  LORazepam   Injectable 1 milliGRAM(s) IV Push once  LORazepam   Injectable 1 milliGRAM(s) IV Push once  melatonin 3 milliGRAM(s) Oral at bedtime  NIFEdipine XL 30 milliGRAM(s) Oral daily  nystatin    Suspension 986056 Unit(s) Oral four times a day  pantoprazole    Tablet 40 milliGRAM(s) Oral before breakfast  simethicone 80 milliGRAM(s) Chew four times a day  sodium chloride 0.9%. 1000 milliLiter(s) (100 mL/Hr) IV Continuous <Continuous>    MEDICATIONS  (PRN):  acetaminophen   Tablet .. 650 milliGRAM(s) Oral every 6 hours PRN Temp greater or equal to 38C (100.4F)  aluminum hydroxide/magnesium hydroxide/simethicone Suspension 30 milliLiter(s) Oral every 6 hours PRN Dyspepsia  dextrose 40% Gel 15 Gram(s) Oral once PRN Blood Glucose LESS THAN 70 milliGRAM(s)/deciliter  glucagon  Injectable 1 milliGRAM(s) IntraMuscular once PRN Glucose LESS THAN 70 milligrams/deciliter  haloperidol    Injectable 2 milliGRAM(s) IntraMuscular every 6 hours PRN Aggitation  LORazepam   Injectable 2 milliGRAM(s) IV Push every 6 hours PRN Anxiety/agitation  metoprolol tartrate Injectable 5 milliGRAM(s) IV Push every 6 hours PRN for heart rate greater than 110bpm      Social History:  Cigarettes:    no                Alchohol:     no            Illicit Drug Abuse:  no  FHx NC  ROS: Negative other than as mentioned in HPI.    Vital Signs Last 24 Hrs  T(C): 37.9 (09 Oct 2018 12:45), Max: 37.9 (09 Oct 2018 12:45)  T(F): 100.2 (09 Oct 2018 12:45), Max: 100.2 (09 Oct 2018 12:45)  HR: 115 (09 Oct 2018 12:45) (101 - 115)  BP: 160/89 (09 Oct 2018 14:10) (150/80 - 173/87)  BP(mean): --  RR: 18 (09 Oct 2018 14:10) (18 - 19)  SpO2: 95% (09 Oct 2018 14:10) (94% - 99%)  ICU Vital Signs Last 24 Hrs  YAMIL OLEA  I&O's Detail    I&O's Summary    Drug Dosing Weight  YAMIL OLEA      PHYSICAL EXAM:  General: Appears well developed, well nourished, lethargic and not able to respond to questions  HEENT: Head; normocephalic, atraumatic.  Eyes: Pupils reactive, cornea wnl.  Neck: Supple, no nodes adenopathy, no NVD or carotid bruit or thyromegaly.  CARDIOVASCULAR: Normal S1 and S2, No murmur, rub, gallop or lift.   LUNGS: No rales, rhonchi or wheeze. Normal breath sounds bilaterally.  ABDOMEN: Soft, nontender without mass or organomegaly. bowel sounds normoactive.  EXTREMITIES: No clubbing, cyanosis or edema. Distal pulses wnl.   SKIN: warm and dry with normal turgor.  NEURO: unable to assess  PSYCH: normal affect.        LABS:                        11.8   10.5  )-----------( 508      ( 09 Oct 2018 13:28 )             38.7     10-09    142  |  100  |  16.0  ----------------------------<  122<H>  3.9   |  27.0  |  0.89    Ca    9.8      09 Oct 2018 13:03    TPro  7.5  /  Alb  3.1<L>  /  TBili  0.4  /  DBili  x   /  AST  17  /  ALT  13  /  AlkPhos  78  10-09    YAMIL EMMIE      PT/INR - ( 09 Oct 2018 09:38 )   PT: 12.5 sec;   INR: 1.13 ratio               RADIOLOGY & ADDITIONAL STUDIES:    INTERPRETATION OF TELEMETRY (personally reviewed): no events thus on tele but briefly here for review    ECG: NS @ 125 LAFB no acute ischemic changes    ECHO:< from: TTE Echo Complete w/Doppler (10.01.18 @ 18:53) >  Summary:   1. Left ventricular ejection fraction, by visual estimation, is 55 to   60%.   2. Normal global left ventricular systolic function.   3. Spectral Doppler shows impaired relaxation pattern of left   ventricular myocardial filling (Grade I diastolic dysfunction).   4. There is no left ventricular hypertrophy.   5. Mild to moderate mitral annular calcification.   6. Thickening of the anterior and posterior mitral valve leaflets.   7. Mild tricuspid regurgitation.   8. Moderate aortic regurgitation.   9. Estimated pulmonary artery systolic pressure is 38.8 mmHg assuming a   right atrial pressure of 15 mmHg, which is consistent with borderline   pulmonary hypertension.    [id number] Houston Rivers MD, Electronically signed on 10/2/2018 at 5:26:59 PM    < end of copied text >        Assessment and Plan:  In summary, YAMIL OLEA is an 68y Male with past medical history significant for HTN, HLD, DM, recent klebsiella PNA and ABx via PICC line, aw fevers and lethargy.  He was found to have lytic lesions of hte hip and when about to have an IR biopsy he was noted to have a seizure  (staring off in one direction and shaking of the upper extremities.) Patient was also noted to be febrile.  Pt has mental retardation and is nonverbal at baseline.    Seizures     1) Observe on tele to ensure not arrhythmic but highly unlikely   2) echo within normal limits  3) Pan cultures and ID followup  4) Appreciate neuro input, may need spinal tap [place] [facility] - [facility], [facility]                                   [address] [place] [address]                                                      PHONE: [phone]                                                         FAX: [phone]  [email or web address]  ---------------------------------------------------------------------------------------------------------------------------------    Reason for Consult: Seizure  [facility]: [place]  HPI:  YAMIL OLEA is an 68y Male PMHx HTN, HLD, DM, recent klebsiella PNA and ABx via PICC line, aw fevers and lethargy.  He was found to have lytic lesions of hte hip and when about to have an IR biopsy he was noted to have a seizure  (staring off in one direction and shaking of the upper extremities.) Patient was also noted to be febrile.  Pt has mental retardation and is nonverbal at baseline.    presenting symptoms are progressive, severe and constant  PAST MEDICAL & SURGICAL HISTORY:  Anemia, unspecified type  Hyperlipidemia, unspecified hyperlipidemia type  Diabetes mellitus of other type with complication  Hypertension, unspecified type  No significant past surgical history      No Known Allergies      MEDICATIONS  (STANDING):  aspirin Suppository 300 milliGRAM(s) Rectal daily  atorvastatin 20 milliGRAM(s) Oral at bedtime  dextrose 5%. 1000 milliLiter(s) (50 mL/Hr) IV Continuous <Continuous>  dextrose 50% Injectable 12.5 Gram(s) IV Push once  dextrose 50% Injectable 25 Gram(s) IV Push once  dextrose 50% Injectable 25 Gram(s) IV Push once  folic acid 1 milliGRAM(s) Oral daily  heparin  Injectable 5000 Unit(s) SubCutaneous every 12 hours  insulin lispro (HumaLOG) corrective regimen sliding scale   SubCutaneous three times a day before meals  insulin lispro (HumaLOG) corrective regimen sliding scale   SubCutaneous at bedtime  levETIRAcetam  IVPB 1000 milliGRAM(s) IV Intermittent every 12 hours  LORazepam   Injectable 1 milliGRAM(s) IV Push once  LORazepam   Injectable 1 milliGRAM(s) IV Push once  melatonin 3 milliGRAM(s) Oral at bedtime  NIFEdipine XL 30 milliGRAM(s) Oral daily  nystatin    Suspension 907879 Unit(s) Oral four times a day  pantoprazole    Tablet 40 milliGRAM(s) Oral before breakfast  simethicone 80 milliGRAM(s) Chew four times a day  sodium chloride 0.9%. 1000 milliLiter(s) (100 mL/Hr) IV Continuous <Continuous>    MEDICATIONS  (PRN):  acetaminophen   Tablet .. 650 milliGRAM(s) Oral every 6 hours PRN Temp greater or equal to 38C (100.4F)  aluminum hydroxide/magnesium hydroxide/simethicone Suspension 30 milliLiter(s) Oral every 6 hours PRN Dyspepsia  dextrose 40% Gel 15 Gram(s) Oral once PRN Blood Glucose LESS THAN 70 milliGRAM(s)/deciliter  glucagon  Injectable 1 milliGRAM(s) IntraMuscular once PRN Glucose LESS THAN 70 milligrams/deciliter  haloperidol    Injectable 2 milliGRAM(s) IntraMuscular every 6 hours PRN Aggitation  LORazepam   Injectable 2 milliGRAM(s) IV Push every 6 hours PRN Anxiety/agitation  metoprolol tartrate Injectable 5 milliGRAM(s) IV Push every 6 hours PRN for heart rate greater than 110bpm      Social History:  Cigarettes:    no                Alchohol:     no            Illicit Drug Abuse:  no  FHx NC  ROS: Negative other than as mentioned in HPI.    Vital Signs Last 24 Hrs  T(C): 37.9 (09 Oct 2018 12:45), Max: 37.9 (09 Oct 2018 12:45)  T(F): 100.2 (09 Oct 2018 12:45), Max: 100.2 (09 Oct 2018 12:45)  HR: 115 (09 Oct 2018 12:45) (101 - 115)  BP: 160/89 (09 Oct 2018 14:10) (150/80 - 173/87)  BP(mean): --  RR: 18 (09 Oct 2018 14:10) (18 - 19)  SpO2: 95% (09 Oct 2018 14:10) (94% - 99%)  ICU Vital Signs Last 24 Hrs  YAMIL OLEA  I&O's Detail    I&O's Summary    Drug Dosing Weight  YAMIL OLEA      PHYSICAL EXAM:  General: Appears well developed, well nourished, lethargic and not able to respond to questions  HEENT: Head; normocephalic, atraumatic.  Eyes: Pupils reactive, cornea wnl.  Neck: Supple, no nodes adenopathy, no NVD or carotid bruit or thyromegaly.  CARDIOVASCULAR: Normal S1 and S2, No murmur, rub, gallop or lift.   LUNGS: No rales, rhonchi or wheeze. Normal breath sounds bilaterally.  ABDOMEN: Soft, nontender without mass or organomegaly. bowel sounds normoactive.  EXTREMITIES: No clubbing, cyanosis or edema. Distal pulses wnl.   SKIN: warm and dry with normal turgor.  NEURO: unable to assess  PSYCH: normal affect.        LABS:                        11.8   10.5  )-----------( 508      ( 09 Oct 2018 13:28 )             38.7     10-09    142  |  100  |  16.0  ----------------------------<  122<H>  3.9   |  27.0  |  0.89    Ca    9.8      09 Oct 2018 13:03    TPro  7.5  /  Alb  3.1<L>  /  TBili  0.4  /  DBili  x   /  AST  17  /  ALT  13  /  AlkPhos  78  10-09    YAMIL EMMIE      PT/INR - ( 09 Oct 2018 09:38 )   PT: 12.5 sec;   INR: 1.13 ratio               RADIOLOGY & ADDITIONAL STUDIES:    INTERPRETATION OF TELEMETRY (personally reviewed): no events thus on tele but briefly here for review    ECG: NS @ 125 LAFB no acute ischemic changes    ECHO:< from: TTE Echo Complete w/Doppler (10.01.18 @ 18:53) >  Summary:   1. Left ventricular ejection fraction, by visual estimation, is 55 to   60%.   2. Normal global left ventricular systolic function.   3. Spectral Doppler shows impaired relaxation pattern of left   ventricular myocardial filling (Grade I diastolic dysfunction).   4. There is no left ventricular hypertrophy.   5. Mild to moderate mitral annular calcification.   6. Thickening of the anterior and posterior mitral valve leaflets.   7. Mild tricuspid regurgitation.   8. Moderate aortic regurgitation.   9. Estimated pulmonary artery systolic pressure is 38.8 mmHg assuming a   right atrial pressure of 15 mmHg, which is consistent with borderline   pulmonary hypertension.    [id number] Houston Rivers MD, Electronically signed on 10/2/2018 at 5:26:59 PM    < end of copied text >        Assessment and Plan:  In summary, YAMIL OLEA is an 68y Male with past medical history significant for HTN, HLD, DM, recent klebsiella PNA and ABx via PICC line, aw fevers and lethargy.  He was found to have lytic lesions of hte hip and when about to have an IR biopsy he was noted to have a seizure  (staring off in one direction and shaking of the upper extremities.) Patient was also noted to be febrile.  Pt has mental retardation and is nonverbal at baseline.    Seizures     1) Check EKG. Observe on tele to ensure not arrhythmic but highly unlikely   2) echo within normal limits  3) Pan cultures and ID followup  4) Appreciate neuro input, may need spinal tap

## 2018-10-09 NOTE — CHART NOTE - NSCHARTNOTEFT_GEN_A_CORE
patient was in the IR suite and had witnesses seizure activity as per IR staff. Patient was looking off in one directions and eyes rolled back and UE started to shake.   A rapid response was called and ativan administered. Patient was noticed to have sinus tach and had a temp of 100,3 and will be transferred to a monitored bed    vitals were stable    a/p  1) seizure --> neuro consult  --> eeg, keppra and ct head    2) sinus tachy --> cardio consult    3) sirs --> sepsis work up   --> hold off on abx

## 2018-10-09 NOTE — PROGRESS NOTE ADULT - SUBJECTIVE AND OBJECTIVE BOX
YAMIL EMMIE    [id number]    68y      Male    INTERVAL HPI/OVERNIGHT EVENTS:    rapid response today called in IR suite, for possible seizure activity as per staff patient was clenching his arms and eyes rolled back and was shaking.     tamx 100.2    REVIEW OF SYSTEMS:    unable to obtain secondary to mental status     Vital Signs Last 24 Hrs  T(C): 37.9 (09 Oct 2018 12:45), Max: 37.9 (09 Oct 2018 12:45)  T(F): 100.2 (09 Oct 2018 12:45), Max: 100.2 (09 Oct 2018 12:45)  HR: 115 (09 Oct 2018 12:45) (101 - 121)  BP: 160/89 (09 Oct 2018 14:10) (150/80 - 173/87)  BP(mean): --  RR: 18 (09 Oct 2018 14:10) (18 - 19)  SpO2: 95% (09 Oct 2018 14:10) (94% - 99%)    PHYSICAL EXAM:  GENERAL: anxious appearing, MR, warm to touch   HEAD:  Atraumatic, Normocephalic  EYES: EOMI, PERRLA,  ENMT:  Moist mucous membranes,  No lesions  NERVOUS SYSTEM:  awake but not alert   CHEST/LUNG: diminished   HEART: Regular rate and rhythm; No murmurs,   ABDOMEN: Soft, Nontender, Nondistended;  EXTREMITIES:  no LE edema, left leg brace  SKIN: Skin tear left elbow, clean, dry    LABS:                        11.8   10.5  )-----------( 508      ( 09 Oct 2018 13:28 )             38.7     10-09    142  |  100  |  16.0  ----------------------------<  122<H>  3.9   |  27.0  |  0.89    Ca    9.8      09 Oct 2018 13:03    TPro  7.5  /  Alb  3.1<L>  /  TBili  0.4  /  DBili  x   /  AST  17  /  ALT  13  /  AlkPhos  78  10-09    PT/INR - ( 09 Oct 2018 09:38 )   PT: 12.5 sec;   INR: 1.13 ratio                 MEDICATIONS  (STANDING):  aspirin Suppository 300 milliGRAM(s) Rectal daily  atorvastatin 20 milliGRAM(s) Oral at bedtime  dextrose 5%. 1000 milliLiter(s) (50 mL/Hr) IV Continuous <Continuous>  dextrose 50% Injectable 12.5 Gram(s) IV Push once  dextrose 50% Injectable 25 Gram(s) IV Push once  dextrose 50% Injectable 25 Gram(s) IV Push once  folic acid 1 milliGRAM(s) Oral daily  insulin lispro (HumaLOG) corrective regimen sliding scale   SubCutaneous three times a day before meals  insulin lispro (HumaLOG) corrective regimen sliding scale   SubCutaneous at bedtime  levETIRAcetam  IVPB 1000 milliGRAM(s) IV Intermittent every 12 hours  LORazepam   Injectable 1 milliGRAM(s) IV Push once  melatonin 3 milliGRAM(s) Oral at bedtime  NIFEdipine XL 30 milliGRAM(s) Oral daily  nystatin    Suspension 622326 Unit(s) Oral four times a day  pantoprazole    Tablet 40 milliGRAM(s) Oral before breakfast  simethicone 80 milliGRAM(s) Chew four times a day  sodium chloride 0.9%. 1000 milliLiter(s) (100 mL/Hr) IV Continuous <Continuous>    MEDICATIONS  (PRN):  acetaminophen   Tablet .. 650 milliGRAM(s) Oral every 6 hours PRN Temp greater or equal to 38C (100.4F)  aluminum hydroxide/magnesium hydroxide/simethicone Suspension 30 milliLiter(s) Oral every 6 hours PRN Dyspepsia  dextrose 40% Gel 15 Gram(s) Oral once PRN Blood Glucose LESS THAN 70 milliGRAM(s)/deciliter  glucagon  Injectable 1 milliGRAM(s) IntraMuscular once PRN Glucose LESS THAN 70 milligrams/deciliter  haloperidol    Injectable 2 milliGRAM(s) IntraMuscular every 6 hours PRN Aggitation  LORazepam   Injectable 2 milliGRAM(s) IV Push every 6 hours PRN Anxiety/agitation      RADIOLOGY & ADDITIONAL TESTS:    ct head --> ordered    eeg --> ordered    chest xray ordered

## 2018-10-09 NOTE — CONSULT NOTE ADULT - ASSESSMENT
The patient is a 68y Male with new seizure.    Seizure.   Agree with maintaining on Keppra for now.   Check CT head.   Check EEG.     Hypertension   Control blood pressure.    Lipid   Continue statin.     ID  Continues to have low grade fevers.   Sepsis work up and management per medicine.     Lytic bone lesion  Heme-Onc following.  Orthopedics had seen him.       Case discussed with Dr Vasquez.

## 2018-10-09 NOTE — CONSULT NOTE ADULT - SUBJECTIVE AND OBJECTIVE BOX
[facility]                                        Neurology at [place]                                  Yung Granado, & Fernando                                      [address]                                           [place], [place], [address]                                                [phone]        CC: seizure     HISTORY:  The patient is a 68y Male admitted 9/29/18 with sepsis.   He had prior admission for Klebsiella pneumonia and was sent home with PICC and antibiotics. At home he became more lethargic and had fevers and was brought back to the hospital. He was noted to have a lytic lesion of the hip and went to IR for biopsy.   Rapid Response was called due to seizure activity.   The patient was in the IR suite and had witnesses seizure activity as per IR staff. Patient was looking off in one directions and eyes rolled back and UE started to shake.   He was given ativan and Neurology evaluation was called.   The patient reportedly has mental retardation and is non verbal at baseline but is able to follow simple instructions. He has no reported prior seizure history.     PAST MEDICAL & SURGICAL HISTORY:  Anemia, unspecified type  Hyperlipidemia, unspecified hyperlipidemia type  Diabetes mellitus of other type with complication  Hypertension, unspecified type  No significant past surgical history      MEDICATION PRIOR TO ADMISSION:  · Vicodin  · ceftazidime-avibactam  · pantoprazole  · simethicone  · aspirin  · aluminum hydroxide-magnesium hydroxide    · acetaminophen   · folic acid   · metFORMIN  · Crestor    MEDICATIONS  (STANDING):  aspirin Suppository 300 milliGRAM(s) Rectal daily  atorvastatin 20 milliGRAM(s) Oral at bedtime  dextrose 5%. 1000 milliLiter(s) (50 mL/Hr) IV Continuous <Continuous>  folic acid 1 milliGRAM(s) Oral daily  heparin  Injectable 5000 Unit(s) SubCutaneous every 12 hours  insulin lispro (HumaLOG) corrective regimen sliding scale   SubCutaneous three times a day before meals  insulin lispro (HumaLOG) corrective regimen sliding scale   SubCutaneous at bedtime  levETIRAcetam  IVPB 1000 milliGRAM(s) IV Intermittent every 12 hours  LORazepam   Injectable 1 milliGRAM(s) IV Push once  LORazepam   Injectable 1 milliGRAM(s) IV Push once  melatonin 3 milliGRAM(s) Oral at bedtime  NIFEdipine XL 30 milliGRAM(s) Oral daily  nystatin    Suspension 251195 Unit(s) Oral four times a day  pantoprazole    Tablet 40 milliGRAM(s) Oral before breakfast  simethicone 80 milliGRAM(s) Chew four times a day  sodium chloride 0.9%. 1000 milliLiter(s) (100 mL/Hr) IV Continuous <Continuous>    MEDICATIONS  (PRN):  acetaminophen   Tablet .. 650 milliGRAM(s) Oral every 6 hours PRN Temp greater or equal to 38C (100.4F)  aluminum hydroxide/magnesium hydroxide/simethicone Suspension 30 milliLiter(s) Oral every 6 hours PRN Dyspepsia  dextrose 40% Gel 15 Gram(s) Oral once PRN Blood Glucose LESS THAN 70 milliGRAM(s)/deciliter  glucagon  Injectable 1 milliGRAM(s) IntraMuscular once PRN Glucose LESS THAN 70 milligrams/deciliter  haloperidol    Injectable 2 milliGRAM(s) IntraMuscular every 6 hours PRN Aggitation  LORazepam   Injectable 2 milliGRAM(s) IV Push every 6 hours PRN Anxiety/agitation  metoprolol tartrate Injectable 5 milliGRAM(s) IV Push every 6 hours PRN for heart rate greater than 110bpm      Allergies  No Known Allergies    SOCIAL HISTORY:  Non smoker.    FAMILY HISTORY:  No pertinent family history in first degree relatives    ROS:  Constitutional: Unobtainable due to patient's condition.   Neuro: Unobtainable due to patient's condition.   Eyes: Unobtainable due to patient's condition.   Ears/nose/throat: Unobtainable due to patient's condition.   Cardiac: Unobtainable due to patient's condition.   Respiratory: Unobtainable due to patient's condition.   GI: Unobtainable due to patient's condition.   : Unobtainable due to patient's condition..  Integumentary: Unobtainable due to patient's condition.  Psych: Unobtainable due to patient's condition.  Heme: Unobtainable due to patient's condition.     Exam:  Vital Signs Last 24 Hrs  T(C): 37.9 (09 Oct 2018 12:45), Max: 37.9 (09 Oct 2018 12:45)  T(F): 100.2 (09 Oct 2018 12:45), Max: 100.2 (09 Oct 2018 12:45)  HR: 115 (09 Oct 2018 12:45) (101 - 121)  BP: 160/89 (09 Oct 2018 14:10) (150/80 - 173/87)  RR: 18 (09 Oct 2018 14:10) (18 - 19)  SpO2: 95% (09 Oct 2018 14:10) (94% - 99%)  General: NAD.   Carotid bruits absent.     Mental status: Lethargic. Opens eyes to voice/stimuli. Not following instructions.     Cranial nerves: There is no papilledema. Pupils react symmetrically to light. He blinks to threat bilaterally. He tracks slightly with gaze. Face appears symmetric. Corneal reflexes present. Palate/tongue cannot be assessed.     Motor/Sensory: There is normal bulk and tone.  Slight movement of all extremities to stimuli. Left leg braced.     Reflexes: Trace throughout and plantar responses are flexor.    Cerebellar: Cannot be assessed.     LABS:                         11.8   10.5  )-----------( 508      ( 09 Oct 2018 13:28 )             38.7       10-09    142  |  100  |  16.0  ----------------------------<  122<H>  3.9   |  27.0  |  0.89    Ca    9.8      09 Oct 2018 13:03    TPro  7.5  /  Alb  3.1<L>  /  TBili  0.4  /  DBili  x   /  AST  17  /  ALT  13  /  AlkPhos  78  10-09      PT/INR - ( 09 Oct 2018 09:38 )   PT: 12.5 sec;   INR: 1.13 ratio          RADIOLOGY   CT head from admission images reviewed (and concur with report): There is no acute pathology.

## 2018-10-10 LAB
ANION GAP SERPL CALC-SCNC: 14 MMOL/L — SIGNIFICANT CHANGE UP (ref 5–17)
BUN SERPL-MCNC: 19 MG/DL — SIGNIFICANT CHANGE UP (ref 8–20)
CALCIUM SERPL-MCNC: 9.5 MG/DL — SIGNIFICANT CHANGE UP (ref 8.6–10.2)
CHLORIDE SERPL-SCNC: 107 MMOL/L — SIGNIFICANT CHANGE UP (ref 98–107)
CO2 SERPL-SCNC: 27 MMOL/L — SIGNIFICANT CHANGE UP (ref 22–29)
CREAT SERPL-MCNC: 0.91 MG/DL — SIGNIFICANT CHANGE UP (ref 0.5–1.3)
CULTURE RESULTS: NO GROWTH — SIGNIFICANT CHANGE UP
GLUCOSE BLDC GLUCOMTR-MCNC: 109 MG/DL — HIGH (ref 70–99)
GLUCOSE BLDC GLUCOMTR-MCNC: 111 MG/DL — HIGH (ref 70–99)
GLUCOSE BLDC GLUCOMTR-MCNC: 119 MG/DL — HIGH (ref 70–99)
GLUCOSE BLDC GLUCOMTR-MCNC: 127 MG/DL — HIGH (ref 70–99)
GLUCOSE BLDC GLUCOMTR-MCNC: 164 MG/DL — HIGH (ref 70–99)
GLUCOSE SERPL-MCNC: 112 MG/DL — SIGNIFICANT CHANGE UP (ref 70–115)
HCT VFR BLD CALC: 36.5 % — LOW (ref 42–52)
HGB BLD-MCNC: 11 G/DL — LOW (ref 14–18)
MAGNESIUM SERPL-MCNC: 2 MG/DL — SIGNIFICANT CHANGE UP (ref 1.8–2.6)
MCHC RBC-ENTMCNC: 27.4 PG — SIGNIFICANT CHANGE UP (ref 27–31)
MCHC RBC-ENTMCNC: 30.1 G/DL — LOW (ref 32–36)
MCV RBC AUTO: 91 FL — SIGNIFICANT CHANGE UP (ref 80–94)
PLATELET # BLD AUTO: 437 K/UL — HIGH (ref 150–400)
POTASSIUM SERPL-MCNC: 3.8 MMOL/L — SIGNIFICANT CHANGE UP (ref 3.5–5.3)
POTASSIUM SERPL-SCNC: 3.8 MMOL/L — SIGNIFICANT CHANGE UP (ref 3.5–5.3)
PROLACTIN SERPL-MCNC: 35.7 NG/ML — HIGH (ref 4.1–18.4)
RBC # BLD: 4.01 M/UL — LOW (ref 4.6–6.2)
RBC # FLD: 16.8 % — HIGH (ref 11–15.6)
SODIUM SERPL-SCNC: 148 MMOL/L — HIGH (ref 135–145)
SPECIMEN SOURCE: SIGNIFICANT CHANGE UP
WBC # BLD: 7.9 K/UL — SIGNIFICANT CHANGE UP (ref 4.8–10.8)
WBC # FLD AUTO: 7.9 K/UL — SIGNIFICANT CHANGE UP (ref 4.8–10.8)

## 2018-10-10 PROCEDURE — 99233 SBSQ HOSP IP/OBS HIGH 50: CPT

## 2018-10-10 PROCEDURE — 99232 SBSQ HOSP IP/OBS MODERATE 35: CPT

## 2018-10-10 PROCEDURE — 71045 X-RAY EXAM CHEST 1 VIEW: CPT | Mod: 26

## 2018-10-10 RX ORDER — FUROSEMIDE 40 MG
40 TABLET ORAL ONCE
Qty: 0 | Refills: 0 | Status: COMPLETED | OUTPATIENT
Start: 2018-10-10 | End: 2018-10-10

## 2018-10-10 RX ORDER — IPRATROPIUM/ALBUTEROL SULFATE 18-103MCG
3 AEROSOL WITH ADAPTER (GRAM) INHALATION EVERY 6 HOURS
Qty: 0 | Refills: 0 | Status: DISCONTINUED | OUTPATIENT
Start: 2018-10-10 | End: 2018-10-12

## 2018-10-10 RX ORDER — HYDRALAZINE HCL 50 MG
10 TABLET ORAL EVERY 6 HOURS
Qty: 0 | Refills: 0 | Status: DISCONTINUED | OUTPATIENT
Start: 2018-10-10 | End: 2018-10-17

## 2018-10-10 RX ADMIN — PANTOPRAZOLE SODIUM 40 MILLIGRAM(S): 20 TABLET, DELAYED RELEASE ORAL at 07:27

## 2018-10-10 RX ADMIN — ATORVASTATIN CALCIUM 20 MILLIGRAM(S): 80 TABLET, FILM COATED ORAL at 21:32

## 2018-10-10 RX ADMIN — Medication 1 MILLIGRAM(S): at 12:10

## 2018-10-10 RX ADMIN — HEPARIN SODIUM 5000 UNIT(S): 5000 INJECTION INTRAVENOUS; SUBCUTANEOUS at 07:27

## 2018-10-10 RX ADMIN — Medication 3 MILLILITER(S): at 15:09

## 2018-10-10 RX ADMIN — Medication 5 MILLIGRAM(S): at 12:11

## 2018-10-10 RX ADMIN — Medication 2: at 17:53

## 2018-10-10 RX ADMIN — Medication 5 MILLIGRAM(S): at 17:52

## 2018-10-10 RX ADMIN — Medication 40 MILLIGRAM(S): at 10:11

## 2018-10-10 RX ADMIN — Medication 3 MILLIGRAM(S): at 00:02

## 2018-10-10 RX ADMIN — Medication 3 MILLILITER(S): at 20:16

## 2018-10-10 RX ADMIN — SIMETHICONE 80 MILLIGRAM(S): 80 TABLET, CHEWABLE ORAL at 00:02

## 2018-10-10 RX ADMIN — Medication 3 MILLIGRAM(S): at 21:32

## 2018-10-10 RX ADMIN — ATORVASTATIN CALCIUM 20 MILLIGRAM(S): 80 TABLET, FILM COATED ORAL at 00:01

## 2018-10-10 RX ADMIN — Medication 300 MILLIGRAM(S): at 12:10

## 2018-10-10 RX ADMIN — SIMETHICONE 80 MILLIGRAM(S): 80 TABLET, CHEWABLE ORAL at 18:01

## 2018-10-10 RX ADMIN — HEPARIN SODIUM 5000 UNIT(S): 5000 INJECTION INTRAVENOUS; SUBCUTANEOUS at 18:01

## 2018-10-10 RX ADMIN — Medication 500000 UNIT(S): at 00:02

## 2018-10-10 RX ADMIN — SIMETHICONE 80 MILLIGRAM(S): 80 TABLET, CHEWABLE ORAL at 12:10

## 2018-10-10 RX ADMIN — LEVETIRACETAM 400 MILLIGRAM(S): 250 TABLET, FILM COATED ORAL at 18:01

## 2018-10-10 RX ADMIN — SIMETHICONE 80 MILLIGRAM(S): 80 TABLET, CHEWABLE ORAL at 07:27

## 2018-10-10 RX ADMIN — LEVETIRACETAM 400 MILLIGRAM(S): 250 TABLET, FILM COATED ORAL at 08:06

## 2018-10-10 RX ADMIN — Medication 500000 UNIT(S): at 07:27

## 2018-10-10 RX ADMIN — Medication 30 MILLIGRAM(S): at 07:27

## 2018-10-10 NOTE — PROGRESS NOTE ADULT - SUBJECTIVE AND OBJECTIVE BOX
YAMIL OLEA    [id number]    68y      Male    INTERVAL HPI/OVERNIGHT EVENTS:    patient being seen for possible CVA, lytic bone lesion and med management. Patient seen at bedside and is non verbal. Patient not in any apparent distress.       REVIEW OF SYSTEMS:    unable to obtain secondary to mental status     Vital Signs Last 24 Hrs  T(C): 37.7 (10 Oct 2018 11:02), Max: 37.7 (09 Oct 2018 16:52)  T(F): 99.8 (10 Oct 2018 11:02), Max: 99.9 (09 Oct 2018 16:52)  HR: 103 (10 Oct 2018 11:02) (83 - 103)  BP: 180/77 (10 Oct 2018 11:02) (140/80 - 180/77)  BP(mean): --  RR: 19 (10 Oct 2018 11:02) (18 - 22)  SpO2: 93% (10 Oct 2018 11:02) (93% - 97%)    PHYSICAL EXAM:    GENERAL:, MR,   HEAD:  Atraumatic, Normocephalic  ENMT:  Moist mucous membranes,  No lesions  NERVOUS SYSTEM:  awake MR  CHEST/LUNG: coarse breath sounds,   HEART: Regular rate and rhythm; No murmurs,   ABDOMEN: Soft, Nontender, Nondistended;  EXTREMITIES:  no LE edema, left leg brace  SKIN: Skin tear left elbow, clean, dry      LABS:                        11.0   7.9   )-----------( 437      ( 10 Oct 2018 06:04 )             36.5     10-10    148<H>  |  107  |  19.0  ----------------------------<  112  3.8   |  27.0  |  0.91    Ca    9.5      10 Oct 2018 06:04  Mg     2.0     10-10    TPro  7.5  /  Alb  3.1<L>  /  TBili  0.4  /  DBili  x   /  AST  17  /  ALT  13  /  AlkPhos  78  10-09    PT/INR - ( 09 Oct 2018 09:38 )   PT: 12.5 sec;   INR: 1.13 ratio           MEDICATIONS  (STANDING):  ALBUTerol/ipratropium for Nebulization 3 milliLiter(s) Nebulizer every 6 hours  aspirin Suppository 300 milliGRAM(s) Rectal daily  atorvastatin 20 milliGRAM(s) Oral at bedtime  dextrose 5%. 1000 milliLiter(s) (50 mL/Hr) IV Continuous <Continuous>  dextrose 50% Injectable 12.5 Gram(s) IV Push once  dextrose 50% Injectable 25 Gram(s) IV Push once  dextrose 50% Injectable 25 Gram(s) IV Push once  folic acid 1 milliGRAM(s) Oral daily  heparin  Injectable 5000 Unit(s) SubCutaneous every 12 hours  insulin lispro (HumaLOG) corrective regimen sliding scale   SubCutaneous three times a day before meals  insulin lispro (HumaLOG) corrective regimen sliding scale   SubCutaneous at bedtime  levETIRAcetam  IVPB 1000 milliGRAM(s) IV Intermittent every 12 hours  melatonin 3 milliGRAM(s) Oral at bedtime  NIFEdipine XL 30 milliGRAM(s) Oral daily  pantoprazole    Tablet 40 milliGRAM(s) Oral before breakfast  simethicone 80 milliGRAM(s) Chew four times a day    MEDICATIONS  (PRN):  acetaminophen   Tablet .. 650 milliGRAM(s) Oral every 6 hours PRN Temp greater or equal to 38C (100.4F)  aluminum hydroxide/magnesium hydroxide/simethicone Suspension 30 milliLiter(s) Oral every 6 hours PRN Dyspepsia  dextrose 40% Gel 15 Gram(s) Oral once PRN Blood Glucose LESS THAN 70 milliGRAM(s)/deciliter  glucagon  Injectable 1 milliGRAM(s) IntraMuscular once PRN Glucose LESS THAN 70 milligrams/deciliter  haloperidol    Injectable 2 milliGRAM(s) IntraMuscular every 6 hours PRN Aggitation  hydrALAZINE Injectable 10 milliGRAM(s) IV Push every 6 hours PRN for sbp above 160 mmhg  LORazepam   Injectable 2 milliGRAM(s) IV Push every 6 hours PRN Anxiety/agitation  metoprolol tartrate Injectable 5 milliGRAM(s) IV Push every 6 hours PRN for heart rate greater than 110bpm      RADIOLOGY & ADDITIONAL TESTS:    ct head -   IMPRESSION:         Limited study secondary to motion artifact. Motion artifact versus low   attenuation areas in the left frontotemporal lobe may represent acute   infarct in proper clinical setting. Further evaluation with MR is   recommended.  Moderate volume loss and small vessel ischemic changes.

## 2018-10-10 NOTE — PROGRESS NOTE ADULT - ASSESSMENT
1) Toxic metabolic encephalopathy --> in setting of MR  --> ct head as above  --> will order mri   --> will treat as stroke with asa and statin   --> neuro following, seems to be back at baseline    2) possible Seziures--> neuro following   --> eeg shows abnormal wave patters  --> c.w keppra     3) low grade fever -->follow up cultures    4) Lytic bone lesion of hip- Hematology following,  --> IR BIOPSY CANCELLED  --> will replace IR consult when appropriate    5) Agitation /Anxiety- Pt calm today, c/w haldol /Ativan prn. Psych note reviewed.     6) BOUCHRA- Resolved.     7) Tibial Plateau fracture- C/w Brace. Ortho signed off, recommended outpatient f/u. NWB left LE, brace at all times. Needs home PT or NOAM    8) HLD- Continue statin Lipitor.    9) DM-II- Monitor FS, LSS.      10) HTN- C/w Nifedipine.  --> add iv hydralazine prn     11)DVT-P- heparin sub q

## 2018-10-10 NOTE — PROGRESS NOTE ADULT - ASSESSMENT
The patient is a 68y Male with new seizure.    Seizure.   Agree with maintaining on Keppra for now  EEG as above    Hypertension   Control blood pressure.    Lipid   Continue statin.     ID  Continues to have low grade fevers.   Sepsis work up and management per medicine.     Lytic bone lesion  Heme-Onc following.  Orthopedics had seen him.     will follow with you    Marcus Barragan MD PhD   [id number].

## 2018-10-10 NOTE — PROGRESS NOTE ADULT - SUBJECTIVE AND OBJECTIVE BOX
[facility]                                   [address] [place] [address]                                                      PHONE: [phone]                                                         FAX: [phone]  [email or web address]  ---------------------------------------------------------------------------------------------------------------------------------    Reason for Consult: Seizure    [facility]: [facility]    HPI:  YAMIL OLEA is an 68y Male PMHx HTN, HLD, DM, recent klebsiella PNA and ABx via PICC line, aw fevers and lethargy.  He was found to have lytic lesions of hte hip and when about to have an IR biopsy he was noted to have a seizure  (staring off in one direction and shaking of the upper extremities.) Patient was also noted to be febrile.  Pt has mental retardation and is nonverbal at baseline.    presenting symptoms are progressive, severe and constant  PAST MEDICAL & SURGICAL HISTORY:  Anemia, unspecified type  Hyperlipidemia, unspecified hyperlipidemia type  Diabetes mellitus of other type with complication  Hypertension, unspecified type  No significant past surgical history      No Known Allergies      MEDICATIONS  (STANDING):  aspirin Suppository 300 milliGRAM(s) Rectal daily  atorvastatin 20 milliGRAM(s) Oral at bedtime  dextrose 5%. 1000 milliLiter(s) (50 mL/Hr) IV Continuous <Continuous>  dextrose 50% Injectable 12.5 Gram(s) IV Push once  dextrose 50% Injectable 25 Gram(s) IV Push once  dextrose 50% Injectable 25 Gram(s) IV Push once  folic acid 1 milliGRAM(s) Oral daily  heparin  Injectable 5000 Unit(s) SubCutaneous every 12 hours  insulin lispro (HumaLOG) corrective regimen sliding scale   SubCutaneous three times a day before meals  insulin lispro (HumaLOG) corrective regimen sliding scale   SubCutaneous at bedtime  levETIRAcetam  IVPB 1000 milliGRAM(s) IV Intermittent every 12 hours  LORazepam   Injectable 1 milliGRAM(s) IV Push once  LORazepam   Injectable 1 milliGRAM(s) IV Push once  melatonin 3 milliGRAM(s) Oral at bedtime  NIFEdipine XL 30 milliGRAM(s) Oral daily  nystatin    Suspension [id number] Unit(s) Oral four times a day  pantoprazole    Tablet 40 milliGRAM(s) Oral before breakfast  simethicone 80 milliGRAM(s) Chew four times a day  sodium chloride 0.9%. 1000 milliLiter(s) (100 mL/Hr) IV Continuous <Continuous>    MEDICATIONS  (PRN):  acetaminophen   Tablet .. 650 milliGRAM(s) Oral every 6 hours PRN Temp greater or equal to 38C (100.4F)  aluminum hydroxide/magnesium hydroxide/simethicone Suspension 30 milliLiter(s) Oral every 6 hours PRN Dyspepsia  dextrose 40% Gel 15 Gram(s) Oral once PRN Blood Glucose LESS THAN 70 milliGRAM(s)/deciliter  glucagon  Injectable 1 milliGRAM(s) IntraMuscular once PRN Glucose LESS THAN 70 milligrams/deciliter  haloperidol    Injectable 2 milliGRAM(s) IntraMuscular every 6 hours PRN Aggitation  LORazepam   Injectable 2 milliGRAM(s) IV Push every 6 hours PRN Anxiety/agitation  metoprolol tartrate Injectable 5 milliGRAM(s) IV Push every 6 hours PRN for heart rate greater than 110bpm      Social History:  Cigarettes:    no                Alchohol:     no            Illicit Drug Abuse:  no  FHx NC  ROS: Negative other than as mentioned in HPI.    Vital Signs Last 24 Hrs  T(C): 37.9 (09 Oct 2018 12:45), Max: 37.9 (09 Oct 2018 12:45)  T(F): 100.2 (09 Oct 2018 12:45), Max: 100.2 (09 Oct 2018 12:45)  HR: 115 (09 Oct 2018 12:45) (101 - 115)  BP: 160/89 (09 Oct 2018 14:10) (150/80 - 173/87)  BP(mean): --  RR: 18 (09 Oct 2018 14:10) (18 - 19)  SpO2: 95% (09 Oct 2018 14:10) (94% - 99%)  ICU Vital Signs Last 24 Hrs  YAMIL OLEA  I&O's Detail    I&O's Summary    Drug Dosing Weight  YAMIL OLEA      PHYSICAL EXAM:  General: Appears well developed, well nourished, lethargic and not able to respond to questions  HEENT: Head; normocephalic, atraumatic.  Eyes: Pupils reactive, cornea wnl.  Neck: Supple, no nodes adenopathy, no NVD or carotid bruit or thyromegaly.  CARDIOVASCULAR: Normal S1 and S2, No murmur, rub, gallop or lift.   LUNGS: No rales, rhonchi or wheeze. Normal breath sounds bilaterally.  ABDOMEN: Soft, nontender without mass or organomegaly. bowel sounds normoactive.  EXTREMITIES: No clubbing, cyanosis or edema. Distal pulses wnl.   SKIN: warm and dry with normal turgor.  NEURO: unable to assess  PSYCH: normal affect.        LABS:                        11.8   10.5  )-----------( 508      ( 09 Oct 2018 13:28 )             38.7     10-09    142  |  100  |  16.0  ----------------------------<  122<H>  3.9   |  27.0  |  0.89    Ca    9.8      09 Oct 2018 13:03    TPro  7.5  /  Alb  3.1<L>  /  TBili  0.4  /  DBili  x   /  AST  17  /  ALT  13  /  AlkPhos  78  10-09    YAMIL EMMIE      PT/INR - ( 09 Oct 2018 09:38 )   PT: 12.5 sec;   INR: 1.13 ratio               RADIOLOGY & ADDITIONAL STUDIES:    INTERPRETATION OF TELEMETRY (personally reviewed): no events thus on tele but briefly here for review    ECG: NS @ 125 LAFB no acute ischemic changes    ECHO:< from: TTE Echo Complete w/Doppler (10.01.18 @ 18:53) >  Summary:   1. Left ventricular ejection fraction, by visual estimation, is 55 to   60%.   2. Normal global left ventricular systolic function.   3. Spectral Doppler shows impaired relaxation pattern of left   ventricular myocardial filling (Grade I diastolic dysfunction).   4. There is no left ventricular hypertrophy.   5. Mild to moderate mitral annular calcification.   6. Thickening of the anterior and posterior mitral valve leaflets.   7. Mild tricuspid regurgitation.   8. Moderate aortic regurgitation.   9. Estimated pulmonary artery systolic pressure is 38.8 mmHg assuming a   right atrial pressure of 15 mmHg, which is consistent with borderline   pulmonary hypertension.    [id number] Houston Rivers MD, Electronically signed on 10/2/2018 at 5:26:59 PM    < end of copied text >        Assessment and Plan:  In summary, YAMIL OLEA is an 68y Male with past medical history significant for HTN, HLD, DM, recent klebsiella PNA and ABx via PICC line, aw fevers and lethargy.  He was found to have lytic lesions of hte hip and when about to have an IR biopsy he was noted to have a seizure  (staring off in one direction and shaking of the upper extremities.) Patient was also noted to be febrile.  Pt has mental retardation and is nonverbal at baseline.    Seizures     1) DC tele  2) ID Follow up  3) Please call us back as need it

## 2018-10-10 NOTE — PROGRESS NOTE ADULT - SUBJECTIVE AND OBJECTIVE BOX
[facility]                                        Neurology at [place]                                  Yung Garnado, & Fernando                                      [address]                                           [place], [place], [address]                                                [phone]        CC: seizure     HISTORY:  The patient is a 68y Male admitted 9/29/18 with sepsis.   He had prior admission for Klebsiella pneumonia and was sent home with PICC and antibiotics. At home he became more lethargic and had fevers and was brought back to the hospital. He was noted to have a lytic lesion of the hip and went to IR for biopsy. Rapid Response was called due to seizure activity. The patient was in the IR suite and had witnesses seizure activity as per IR staff. Patient was looking off in one directions and eyes rolled back and UE started to shake. He was given ativan and Neurology evaluation was called.  The patient reportedly has mental retardation and is non verbal at baseline but is able to follow simple instructions. He has no reported prior seizure history.     ROS neurology: non verbal, can not obtain    MEDICATIONS  (STANDING):  ALBUTerol/ipratropium for Nebulization 3 milliLiter(s) Nebulizer every 6 hours  aspirin Suppository 300 milliGRAM(s) Rectal daily  atorvastatin 20 milliGRAM(s) Oral at bedtime  dextrose 5%. 1000 milliLiter(s) (50 mL/Hr) IV Continuous <Continuous>  dextrose 50% Injectable 12.5 Gram(s) IV Push once  dextrose 50% Injectable 25 Gram(s) IV Push once  dextrose 50% Injectable 25 Gram(s) IV Push once  folic acid 1 milliGRAM(s) Oral daily  heparin  Injectable 5000 Unit(s) SubCutaneous every 12 hours  insulin lispro (HumaLOG) corrective regimen sliding scale   SubCutaneous three times a day before meals  insulin lispro (HumaLOG) corrective regimen sliding scale   SubCutaneous at bedtime  levETIRAcetam  IVPB 1000 milliGRAM(s) IV Intermittent every 12 hours  melatonin 3 milliGRAM(s) Oral at bedtime  NIFEdipine XL 30 milliGRAM(s) Oral daily  pantoprazole    Tablet 40 milliGRAM(s) Oral before breakfast  simethicone 80 milliGRAM(s) Chew four times a day    MEDICATIONS  (PRN):  acetaminophen   Tablet .. 650 milliGRAM(s) Oral every 6 hours PRN Temp greater or equal to 38C (100.4F)  aluminum hydroxide/magnesium hydroxide/simethicone Suspension 30 milliLiter(s) Oral every 6 hours PRN Dyspepsia  dextrose 40% Gel 15 Gram(s) Oral once PRN Blood Glucose LESS THAN 70 milliGRAM(s)/deciliter  glucagon  Injectable 1 milliGRAM(s) IntraMuscular once PRN Glucose LESS THAN 70 milligrams/deciliter  haloperidol    Injectable 2 milliGRAM(s) IntraMuscular every 6 hours PRN Aggitation  hydrALAZINE Injectable 10 milliGRAM(s) IV Push every 6 hours PRN for sbp above 160 mmhg  LORazepam   Injectable 2 milliGRAM(s) IV Push every 6 hours PRN Anxiety/agitation  metoprolol tartrate Injectable 5 milliGRAM(s) IV Push every 6 hours PRN for heart rate greater than 110bpm      Vital Signs Last 24 Hrs  T(C): 37.7 (10 Oct 2018 11:02), Max: 37.7 (09 Oct 2018 16:52)  T(F): 99.8 (10 Oct 2018 11:02), Max: 99.9 (09 Oct 2018 16:52)  HR: 103 (10 Oct 2018 11:02) (83 - 103)  BP: 180/77 (10 Oct 2018 11:02) (140/80 - 180/77)  BP(mean): --  RR: 19 (10 Oct 2018 11:02) (18 - 22)  SpO2: 93% (10 Oct 2018 11:02) (93% - 97%)    Detailed neuro exam:    Mental status: Lethargic. eyes open. Not following instructions. non verbal    Cranial nerves:  Pupils react symmetrically to light. He blinks to threat bilaterally. He tracks slightly with gaze. Face appears symmetric. Corneal reflexes present. Palate/tongue cannot be assessed.     Motor/Sensory: There is normal bulk and tone.  Slight movement of all extremities to stimuli. Left leg braced.     Reflexes: Trace throughout and plantar responses are flexor.    Cerebellar: Cannot be assessed.     LABS:                                    11.0   7.9   )-----------( 437      ( 10 Oct 2018 06:04 )             36.5     10-10    148<H>  |  107  |  19.0  ----------------------------<  112  3.8   |  27.0  |  0.91    Ca    9.5      10 Oct 2018 06:04  Mg     2.0     10-10    TPro  7.5  /  Alb  3.1<L>  /  TBili  0.4  /  DBili  x   /  AST  17  /  ALT  13  /  AlkPhos  78  10-09    LIVER FUNCTIONS - ( 09 Oct 2018 13:03 )  Alb: 3.1 g/dL / Pro: 7.5 g/dL / ALK PHOS: 78 U/L / ALT: 13 U/L / AST: 17 U/L / GGT: x           PT/INR - ( 09 Oct 2018 09:38 )   PT: 12.5 sec;   INR: 1.13 ratio      EEG no seizure, slow diffusely      RADIOLOGY   CT head from admission: There is no acute pathology.

## 2018-10-10 NOTE — PROVIDER CONTACT NOTE (OTHER) - SITUATION
This patient was being followed by physical therapy. he has now been transferred to [address] [place] [address] and will require new orders to resume when medically stable. will hold therapy at this time pending receipt.

## 2018-10-11 LAB
ANION GAP SERPL CALC-SCNC: 13 MMOL/L — SIGNIFICANT CHANGE UP (ref 5–17)
BUN SERPL-MCNC: 22 MG/DL — HIGH (ref 8–20)
CALCIUM SERPL-MCNC: 9.1 MG/DL — SIGNIFICANT CHANGE UP (ref 8.6–10.2)
CHLORIDE SERPL-SCNC: 107 MMOL/L — SIGNIFICANT CHANGE UP (ref 98–107)
CO2 SERPL-SCNC: 28 MMOL/L — SIGNIFICANT CHANGE UP (ref 22–29)
CREAT SERPL-MCNC: 0.83 MG/DL — SIGNIFICANT CHANGE UP (ref 0.5–1.3)
GLUCOSE BLDC GLUCOMTR-MCNC: 111 MG/DL — HIGH (ref 70–99)
GLUCOSE BLDC GLUCOMTR-MCNC: 128 MG/DL — HIGH (ref 70–99)
GLUCOSE BLDC GLUCOMTR-MCNC: 142 MG/DL — HIGH (ref 70–99)
GLUCOSE BLDC GLUCOMTR-MCNC: 154 MG/DL — HIGH (ref 70–99)
GLUCOSE SERPL-MCNC: 110 MG/DL — SIGNIFICANT CHANGE UP (ref 70–115)
HCT VFR BLD CALC: 34.7 % — LOW (ref 42–52)
HGB BLD-MCNC: 10.6 G/DL — LOW (ref 14–18)
MAGNESIUM SERPL-MCNC: 1.8 MG/DL — SIGNIFICANT CHANGE UP (ref 1.6–2.6)
MCHC RBC-ENTMCNC: 28 PG — SIGNIFICANT CHANGE UP (ref 27–31)
MCHC RBC-ENTMCNC: 30.5 G/DL — LOW (ref 32–36)
MCV RBC AUTO: 91.8 FL — SIGNIFICANT CHANGE UP (ref 80–94)
PLATELET # BLD AUTO: 375 K/UL — SIGNIFICANT CHANGE UP (ref 150–400)
POTASSIUM SERPL-MCNC: 3.2 MMOL/L — LOW (ref 3.5–5.3)
POTASSIUM SERPL-SCNC: 3.2 MMOL/L — LOW (ref 3.5–5.3)
RBC # BLD: 3.78 M/UL — LOW (ref 4.6–6.2)
RBC # FLD: 16.8 % — HIGH (ref 11–15.6)
SODIUM SERPL-SCNC: 148 MMOL/L — HIGH (ref 135–145)
WBC # BLD: 6.3 K/UL — SIGNIFICANT CHANGE UP (ref 4.8–10.8)
WBC # FLD AUTO: 6.3 K/UL — SIGNIFICANT CHANGE UP (ref 4.8–10.8)

## 2018-10-11 PROCEDURE — 99232 SBSQ HOSP IP/OBS MODERATE 35: CPT

## 2018-10-11 PROCEDURE — 99233 SBSQ HOSP IP/OBS HIGH 50: CPT

## 2018-10-11 RX ORDER — INFLUENZA VIRUS VACCINE 15; 15; 15; 15 UG/.5ML; UG/.5ML; UG/.5ML; UG/.5ML
0.5 SUSPENSION INTRAMUSCULAR ONCE
Qty: 0 | Refills: 0 | Status: DISCONTINUED | OUTPATIENT
Start: 2018-10-11 | End: 2018-10-17

## 2018-10-11 RX ORDER — MAGNESIUM SULFATE 500 MG/ML
1 VIAL (ML) INJECTION ONCE
Qty: 0 | Refills: 0 | Status: COMPLETED | OUTPATIENT
Start: 2018-10-11 | End: 2018-10-11

## 2018-10-11 RX ORDER — POTASSIUM CHLORIDE 20 MEQ
40 PACKET (EA) ORAL EVERY 4 HOURS
Qty: 0 | Refills: 0 | Status: COMPLETED | OUTPATIENT
Start: 2018-10-11 | End: 2018-10-11

## 2018-10-11 RX ORDER — POTASSIUM CHLORIDE 20 MEQ
40 PACKET (EA) ORAL EVERY 4 HOURS
Qty: 0 | Refills: 0 | Status: DISCONTINUED | OUTPATIENT
Start: 2018-10-11 | End: 2018-10-11

## 2018-10-11 RX ADMIN — HEPARIN SODIUM 5000 UNIT(S): 5000 INJECTION INTRAVENOUS; SUBCUTANEOUS at 06:33

## 2018-10-11 RX ADMIN — Medication 1 MILLIGRAM(S): at 12:02

## 2018-10-11 RX ADMIN — Medication 10 MILLIGRAM(S): at 21:20

## 2018-10-11 RX ADMIN — Medication 300 MILLIGRAM(S): at 14:42

## 2018-10-11 RX ADMIN — LEVETIRACETAM 400 MILLIGRAM(S): 250 TABLET, FILM COATED ORAL at 06:34

## 2018-10-11 RX ADMIN — Medication 3 MILLIGRAM(S): at 22:25

## 2018-10-11 RX ADMIN — ATORVASTATIN CALCIUM 20 MILLIGRAM(S): 80 TABLET, FILM COATED ORAL at 22:25

## 2018-10-11 RX ADMIN — Medication 3 MILLILITER(S): at 20:19

## 2018-10-11 RX ADMIN — SIMETHICONE 80 MILLIGRAM(S): 80 TABLET, CHEWABLE ORAL at 23:11

## 2018-10-11 RX ADMIN — SIMETHICONE 80 MILLIGRAM(S): 80 TABLET, CHEWABLE ORAL at 06:33

## 2018-10-11 RX ADMIN — PANTOPRAZOLE SODIUM 40 MILLIGRAM(S): 20 TABLET, DELAYED RELEASE ORAL at 06:33

## 2018-10-11 RX ADMIN — Medication 3 MILLILITER(S): at 08:14

## 2018-10-11 RX ADMIN — SIMETHICONE 80 MILLIGRAM(S): 80 TABLET, CHEWABLE ORAL at 12:02

## 2018-10-11 RX ADMIN — LEVETIRACETAM 400 MILLIGRAM(S): 250 TABLET, FILM COATED ORAL at 18:29

## 2018-10-11 RX ADMIN — Medication 30 MILLIGRAM(S): at 06:34

## 2018-10-11 RX ADMIN — Medication 3 MILLILITER(S): at 15:18

## 2018-10-11 RX ADMIN — Medication 40 MILLIEQUIVALENT(S): at 18:29

## 2018-10-11 RX ADMIN — HEPARIN SODIUM 5000 UNIT(S): 5000 INJECTION INTRAVENOUS; SUBCUTANEOUS at 18:29

## 2018-10-11 RX ADMIN — Medication 3 MILLILITER(S): at 02:11

## 2018-10-11 RX ADMIN — Medication 40 MILLIEQUIVALENT(S): at 14:42

## 2018-10-11 RX ADMIN — Medication 100 GRAM(S): at 12:01

## 2018-10-11 RX ADMIN — SIMETHICONE 80 MILLIGRAM(S): 80 TABLET, CHEWABLE ORAL at 18:31

## 2018-10-11 RX ADMIN — Medication 2 MILLIGRAM(S): at 22:26

## 2018-10-11 NOTE — PROGRESS NOTE ADULT - ASSESSMENT
1) Toxic metabolic encephalopathy --> in setting of MR  --> asa and statin   --> will order mri   --> pureed diet   --> neuro following, seems to be back at baseline    2) possible Seziures--> secobndary to cva  --> eeg shows abnormal wave patters  --> c.w keppra     3) low grade fever -->follow up cultures  --> resolved     4) Lytic bone lesion of hip- Hematology following,  --> IR BIOPSY CANCELLED  --> will replace IR consult when appropriate    5) Agitation /Anxiety- Pt calm today, c/w haldol /Ativan prn. Psych note reviewed.     6) BOUCHRA- Resolved.     7) Tibial Plateau fracture- C/w Brace. Ortho signed off, recommended outpatient f/u. NWB left LE, brace at all times. Needs home PT or NOAM    8) HLD- Continue statin Lipitor.    9) DM-II- Monitor FS, LSS.      10) HTN- C/w Nifedipine.    11)DVT-P- heparin sub q

## 2018-10-11 NOTE — SWALLOW BEDSIDE ASSESSMENT ADULT - SWALLOW EVAL: DIAGNOSIS
Cognition impacting oral phase of swallow due to oral holding for po that requires mastication. Oral & pharyngeal stage of swallow judged to be WFL for puree with thin liquids

## 2018-10-11 NOTE — SWALLOW BEDSIDE ASSESSMENT ADULT - SLP PERTINENT HISTORY OF CURRENT PROBLEM
As per h&p: Sepsis. As per neurology note: He was noted to have a lytic lesion of the hip and went to IR for biopsy. Rapid Response was called due to seizure activity. The patient was in the IR suite and had witnesses seizure activity as per IR staff.

## 2018-10-11 NOTE — PROGRESS NOTE ADULT - SUBJECTIVE AND OBJECTIVE BOX
YAMIL EMMIE    [id number]    68y      Male    INTERVAL HPI/OVERNIGHT EVENTS:    patient being seen for likely CVA, lytic bone lesion and med management. patient seen at bedside and is back to baseline as per niece at bedside.       REVIEW OF SYSTEMS:    unable to obtain secondary to mental status    Vital Signs Last 24 Hrs  T(C): 37.3 (11 Oct 2018 09:45), Max: 37.3 (11 Oct 2018 09:45)  T(F): 99.2 (11 Oct 2018 09:45), Max: 99.2 (11 Oct 2018 09:45)  HR: 100 (11 Oct 2018 09:45) (69 - 109)  BP: 130/70 (11 Oct 2018 10:12) (130/70 - 160/80)  BP(mean): --  RR: 16 (11 Oct 2018 06:26) (16 - 17)  SpO2: 96% (11 Oct 2018 08:14) (93% - 98%)    PHYSICAL EXAM:      GENERAL:, MR,   HEAD:  Atraumatic, Normocephalic  ENMT:  Moist mucous membranes,  No lesions  NERVOUS SYSTEM:  awake MR  CHEST/LUNG: coarse breath sounds,   HEART: Regular rate and rhythm; No murmurs,   ABDOMEN: Soft, Nontender, Nondistended;  EXTREMITIES:  no LE edema, left leg brace  SKIN: Skin tear left elbow, clean, dry          LABS:                        10.6   6.3   )-----------( 375      ( 11 Oct 2018 07:49 )             34.7     10-11    148<H>  |  107  |  22.0<H>  ----------------------------<  110  3.2<L>   |  28.0  |  0.83    Ca    9.1      11 Oct 2018 07:49  Mg     1.8     10-11              MEDICATIONS  (STANDING):  ALBUTerol/ipratropium for Nebulization 3 milliLiter(s) Nebulizer every 6 hours  aspirin Suppository 300 milliGRAM(s) Rectal daily  atorvastatin 20 milliGRAM(s) Oral at bedtime  dextrose 5%. 1000 milliLiter(s) (50 mL/Hr) IV Continuous <Continuous>  dextrose 50% Injectable 12.5 Gram(s) IV Push once  dextrose 50% Injectable 25 Gram(s) IV Push once  dextrose 50% Injectable 25 Gram(s) IV Push once  folic acid 1 milliGRAM(s) Oral daily  heparin  Injectable 5000 Unit(s) SubCutaneous every 12 hours  insulin lispro (HumaLOG) corrective regimen sliding scale   SubCutaneous three times a day before meals  insulin lispro (HumaLOG) corrective regimen sliding scale   SubCutaneous at bedtime  levETIRAcetam  IVPB 1000 milliGRAM(s) IV Intermittent every 12 hours  melatonin 3 milliGRAM(s) Oral at bedtime  NIFEdipine XL 30 milliGRAM(s) Oral daily  pantoprazole    Tablet 40 milliGRAM(s) Oral before breakfast  potassium chloride   Powder 40 milliEquivalent(s) Oral every 4 hours  simethicone 80 milliGRAM(s) Chew four times a day    MEDICATIONS  (PRN):  acetaminophen   Tablet .. 650 milliGRAM(s) Oral every 6 hours PRN Temp greater or equal to 38C (100.4F)  aluminum hydroxide/magnesium hydroxide/simethicone Suspension 30 milliLiter(s) Oral every 6 hours PRN Dyspepsia  dextrose 40% Gel 15 Gram(s) Oral once PRN Blood Glucose LESS THAN 70 milliGRAM(s)/deciliter  glucagon  Injectable 1 milliGRAM(s) IntraMuscular once PRN Glucose LESS THAN 70 milligrams/deciliter  haloperidol    Injectable 2 milliGRAM(s) IntraMuscular every 6 hours PRN Aggitation  hydrALAZINE Injectable 10 milliGRAM(s) IV Push every 6 hours PRN for sbp above 160 mmhg  LORazepam   Injectable 2 milliGRAM(s) IV Push every 6 hours PRN Anxiety/agitation  metoprolol tartrate Injectable 5 milliGRAM(s) IV Push every 6 hours PRN for heart rate greater than 110bpm      RADIOLOGY & ADDITIONAL TESTS:    mri head --> ordered

## 2018-10-11 NOTE — PROGRESS NOTE ADULT - SUBJECTIVE AND OBJECTIVE BOX
[facility]                                        [facility]                                 Yung Granado, & Fernando                                  [address]                                        [place], [place], [address]                                             [phone]        CC: seizure     HISTORY:  The patient is a 68y Male admitted 9/29/18 with sepsis.   He had prior admission for Klebsiella pneumonia and was sent home with PICC and antibiotics. At home he became more lethargic and had fevers and was brought back to the hospital. He was noted to have a lytic lesion of the hip and went to IR for biopsy. Rapid Response was called due to seizure activity. The patient was in the IR suite and had witnesses seizure activity as per IR staff. Patient was looking off in one directions and eyes rolled back and UE started to shake. He was given ativan and Neurology evaluation was called.  The patient reportedly has mental retardation and is non verbal at baseline but is able to follow simple instructions. He has no reported prior seizure history.     Interim history:  No further seizures.     ROS:   Unobtainable due to patient's condition.     MEDICATIONS  (STANDING):  ALBUTerol/ipratropium for Nebulization 3 milliLiter(s) Nebulizer every 6 hours  aspirin Suppository 300 milliGRAM(s) Rectal daily  atorvastatin 20 milliGRAM(s) Oral at bedtime  dextrose 5%. 1000 milliLiter(s) (50 mL/Hr) IV Continuous <Continuous>  dextrose 50% Injectable 12.5 Gram(s) IV Push once  dextrose 50% Injectable 25 Gram(s) IV Push once  dextrose 50% Injectable 25 Gram(s) IV Push once  folic acid 1 milliGRAM(s) Oral daily  heparin  Injectable 5000 Unit(s) SubCutaneous every 12 hours  insulin lispro (HumaLOG) corrective regimen sliding scale   SubCutaneous three times a day before meals  insulin lispro (HumaLOG) corrective regimen sliding scale   SubCutaneous at bedtime  levETIRAcetam  IVPB 1000 milliGRAM(s) IV Intermittent every 12 hours  magnesium sulfate  IVPB 1 Gram(s) IV Intermittent once  melatonin 3 milliGRAM(s) Oral at bedtime  NIFEdipine XL 30 milliGRAM(s) Oral daily  pantoprazole    Tablet 40 milliGRAM(s) Oral before breakfast  potassium chloride   Powder 40 milliEquivalent(s) Oral every 4 hours  simethicone 80 milliGRAM(s) Chew four times a day      Vital Signs Last 24 Hrs  T(C): 37.3 (11 Oct 2018 09:45), Max: 37.7 (10 Oct 2018 11:02)  T(F): 99.2 (11 Oct 2018 09:45), Max: 99.8 (10 Oct 2018 11:02)  HR: 100 (11 Oct 2018 09:45) (69 - 112)  BP: 130/70 (11 Oct 2018 10:12) (130/70 - 180/77)  RR: 16 (11 Oct 2018 06:26) (16 - 19)  SpO2: 96% (11 Oct 2018 08:14) (93% - 98%)    Detailed Neurologic Exam:    Mental status: The patient is awake and alert now. He is able to verbalize name but not much else.     Cranial nerves: Pupils equal and react symmetrically to light. There is no visual field deficit to threat. He tracks with gaze.  Facial musculature is symmetric. Palate elevates symmetrically. Tongue is midline.    Motor: There is normal bulk and tone.  There is no tremor.  He is moving both sides spontaneously.   Left leg limited at the knee.     Sensation: Responds to tactile stimuli bilaterally.    Reflexes: Trace throughout and plantar responses are flexor.    Cerebellar: Cannot be assessed.    Labs:     10-11    148<H>  |  107  |  22.0<H>  ----------------------------<  110  3.2<L>   |  28.0  |  0.83    Ca    9.1      11 Oct 2018 07:49  Mg     1.8     10-11    TPro  7.5  /  Alb  3.1<L>  /  TBili  0.4  /  DBili  x   /  AST  17  /  ALT  13  /  AlkPhos  78  10-09                            10.6   6.3   )-----------( 375      ( 11 Oct 2018 07:49 )             34.7         EEG no seizure, slow diffusely      RADIOLOGY   Repeat CT from 10/9 was confounded by movement artifact.

## 2018-10-11 NOTE — PROGRESS NOTE ADULT - ASSESSMENT
68y Male with new seizure.    Seizure.   Agree with maintaining on Keppra for now  EEG as above.  Back to baseline mental status per niece at bedside.     Hypertension   Control blood pressure.    Lipid   Continue statin.     ID  Continues to have low grade fevers.   Sepsis work up and management per medicine.     Lytic bone lesion  Heme-Onc following.  Orthopedics had seen him as well.    Condition discussed with niece at bedside.

## 2018-10-11 NOTE — SWALLOW BEDSIDE ASSESSMENT ADULT - ORAL PHASE
Within functional limits Delayed oral transit time/+oral holding, due to inattention to bolus, cognition impacting oral phase

## 2018-10-12 LAB
ANION GAP SERPL CALC-SCNC: 12 MMOL/L — SIGNIFICANT CHANGE UP (ref 5–17)
BUN SERPL-MCNC: 22 MG/DL — HIGH (ref 8–20)
CALCIUM SERPL-MCNC: 9.1 MG/DL — SIGNIFICANT CHANGE UP (ref 8.6–10.2)
CHLORIDE SERPL-SCNC: 103 MMOL/L — SIGNIFICANT CHANGE UP (ref 98–107)
CO2 SERPL-SCNC: 28 MMOL/L — SIGNIFICANT CHANGE UP (ref 22–29)
CREAT SERPL-MCNC: 1.09 MG/DL — SIGNIFICANT CHANGE UP (ref 0.5–1.3)
GLUCOSE BLDC GLUCOMTR-MCNC: 126 MG/DL — HIGH (ref 70–99)
GLUCOSE BLDC GLUCOMTR-MCNC: 133 MG/DL — HIGH (ref 70–99)
GLUCOSE BLDC GLUCOMTR-MCNC: 211 MG/DL — HIGH (ref 70–99)
GLUCOSE BLDC GLUCOMTR-MCNC: 92 MG/DL — SIGNIFICANT CHANGE UP (ref 70–99)
GLUCOSE SERPL-MCNC: 133 MG/DL — HIGH (ref 70–115)
HCT VFR BLD CALC: 32.5 % — LOW (ref 42–52)
HGB BLD-MCNC: 9.7 G/DL — LOW (ref 14–18)
MCHC RBC-ENTMCNC: 27.2 PG — SIGNIFICANT CHANGE UP (ref 27–31)
MCHC RBC-ENTMCNC: 29.8 G/DL — LOW (ref 32–36)
MCV RBC AUTO: 91 FL — SIGNIFICANT CHANGE UP (ref 80–94)
PLATELET # BLD AUTO: 358 K/UL — SIGNIFICANT CHANGE UP (ref 150–400)
POTASSIUM SERPL-MCNC: 3.8 MMOL/L — SIGNIFICANT CHANGE UP (ref 3.5–5.3)
POTASSIUM SERPL-SCNC: 3.8 MMOL/L — SIGNIFICANT CHANGE UP (ref 3.5–5.3)
RBC # BLD: 3.57 M/UL — LOW (ref 4.6–6.2)
RBC # FLD: 16.8 % — HIGH (ref 11–15.6)
SODIUM SERPL-SCNC: 143 MMOL/L — SIGNIFICANT CHANGE UP (ref 135–145)
WBC # BLD: 5.9 K/UL — SIGNIFICANT CHANGE UP (ref 4.8–10.8)
WBC # FLD AUTO: 5.9 K/UL — SIGNIFICANT CHANGE UP (ref 4.8–10.8)

## 2018-10-12 PROCEDURE — 99232 SBSQ HOSP IP/OBS MODERATE 35: CPT

## 2018-10-12 RX ADMIN — HEPARIN SODIUM 5000 UNIT(S): 5000 INJECTION INTRAVENOUS; SUBCUTANEOUS at 05:53

## 2018-10-12 RX ADMIN — ATORVASTATIN CALCIUM 20 MILLIGRAM(S): 80 TABLET, FILM COATED ORAL at 21:23

## 2018-10-12 RX ADMIN — SIMETHICONE 80 MILLIGRAM(S): 80 TABLET, CHEWABLE ORAL at 12:21

## 2018-10-12 RX ADMIN — Medication 3 MILLILITER(S): at 03:30

## 2018-10-12 RX ADMIN — LEVETIRACETAM 400 MILLIGRAM(S): 250 TABLET, FILM COATED ORAL at 18:20

## 2018-10-12 RX ADMIN — Medication 3 MILLIGRAM(S): at 21:23

## 2018-10-12 RX ADMIN — HEPARIN SODIUM 5000 UNIT(S): 5000 INJECTION INTRAVENOUS; SUBCUTANEOUS at 18:20

## 2018-10-12 RX ADMIN — LEVETIRACETAM 400 MILLIGRAM(S): 250 TABLET, FILM COATED ORAL at 05:53

## 2018-10-12 RX ADMIN — SIMETHICONE 80 MILLIGRAM(S): 80 TABLET, CHEWABLE ORAL at 23:25

## 2018-10-12 RX ADMIN — Medication 3 MILLILITER(S): at 09:08

## 2018-10-12 RX ADMIN — SIMETHICONE 80 MILLIGRAM(S): 80 TABLET, CHEWABLE ORAL at 18:20

## 2018-10-12 NOTE — PROGRESS NOTE ADULT - ASSESSMENT
1) Toxic metabolic encephalopathy --> in setting of MR  --> asa and statin   --> unable to obtain MRI, would not .   --> NPO  --> neuro following, seems to be back at baseline    2) possible Seziures--> secondary to cva  --> eeg shows abnormal wave patters  --> c.w keppra     3) low grade fever --> all cultures negative    4) Lytic bone lesion of hip- Hematology following,  --> IR BIOPSY CANCELLED  --> will try biopsy for monday     5) Agitation /Anxiety- Pt calm today, c/w haldol /Ativan prn. Psych note reviewed.     6) BOUCHRA- Resolved.     7) Tibial Plateau fracture- C/w Brace. Ortho signed off, recommended outpatient f/u. NWB left LE, brace at all times. Needs home PT or NOAM    8) HLD- Continue statin Lipitor.    9) DM-II- Monitor FS, LSS.      10) HTN- C/w Nifedipine.    11)DVT-P- heparin sub q

## 2018-10-12 NOTE — PROGRESS NOTE ADULT - SUBJECTIVE AND OBJECTIVE BOX
YAMIL EMMIE    [id number]    68y      Male    INTERVAL HPI/OVERNIGHT EVENTS:    patient being seen for cva, lytic bone lesion and med management.  patient seen at bedside and is pocketing his food as per nurse    MRI was attempted today and was not able to tolerate.     REVIEW OF SYSTEMS:    unable to obtain secondary to mental status       Vital Signs Last 24 Hrs  T(C): 36.6 (12 Oct 2018 12:49), Max: 37.3 (11 Oct 2018 17:13)  T(F): 97.8 (12 Oct 2018 12:49), Max: 99.1 (11 Oct 2018 17:13)  HR: 72 (12 Oct 2018 12:49) (71 - 97)  BP: 123/62 (12 Oct 2018 12:49) (112/55 - 173/81)  BP(mean): --  RR: 18 (12 Oct 2018 12:49) (18 - 18)  SpO2: 92% (12 Oct 2018 09:10) (92% - 98%)    PHYSICAL EXAM:    GENERAL:, MR,   HEAD:  Atraumatic, Normocephalic  ENMT:  Moist mucous membranes,  No lesions  NERVOUS SYSTEM:  awake MR  CHEST/LUNG: clear  HEART: Regular rate and rhythm; No murmurs,   ABDOMEN: Soft, Nontender, Nondistended;  EXTREMITIES:  left leg brace  SKIN: Skin tear left elbow, clean, dry        LABS:                        9.7    5.9   )-----------( 358      ( 12 Oct 2018 07:17 )             32.5     10-12    143  |  103  |  22.0<H>  ----------------------------<  133<H>  3.8   |  28.0  |  1.09    Ca    9.1      12 Oct 2018 07:17  Mg     1.8     10-11        MEDICATIONS  (STANDING):  aspirin Suppository 300 milliGRAM(s) Rectal daily  atorvastatin 20 milliGRAM(s) Oral at bedtime  dextrose 5%. 1000 milliLiter(s) (50 mL/Hr) IV Continuous <Continuous>  dextrose 50% Injectable 12.5 Gram(s) IV Push once  dextrose 50% Injectable 25 Gram(s) IV Push once  dextrose 50% Injectable 25 Gram(s) IV Push once  folic acid 1 milliGRAM(s) Oral daily  heparin  Injectable 5000 Unit(s) SubCutaneous every 12 hours  influenza   Vaccine 0.5 milliLiter(s) IntraMuscular once  insulin lispro (HumaLOG) corrective regimen sliding scale   SubCutaneous three times a day before meals  insulin lispro (HumaLOG) corrective regimen sliding scale   SubCutaneous at bedtime  levETIRAcetam  IVPB 1000 milliGRAM(s) IV Intermittent every 12 hours  melatonin 3 milliGRAM(s) Oral at bedtime  NIFEdipine XL 30 milliGRAM(s) Oral daily  pantoprazole    Tablet 40 milliGRAM(s) Oral before breakfast  simethicone 80 milliGRAM(s) Chew four times a day    MEDICATIONS  (PRN):  acetaminophen   Tablet .. 650 milliGRAM(s) Oral every 6 hours PRN Temp greater or equal to 38C (100.4F)  aluminum hydroxide/magnesium hydroxide/simethicone Suspension 30 milliLiter(s) Oral every 6 hours PRN Dyspepsia  dextrose 40% Gel 15 Gram(s) Oral once PRN Blood Glucose LESS THAN 70 milliGRAM(s)/deciliter  glucagon  Injectable 1 milliGRAM(s) IntraMuscular once PRN Glucose LESS THAN 70 milligrams/deciliter  haloperidol    Injectable 2 milliGRAM(s) IntraMuscular every 6 hours PRN Aggitation  hydrALAZINE Injectable 10 milliGRAM(s) IV Push every 6 hours PRN for sbp above 160 mmhg  LORazepam   Injectable 2 milliGRAM(s) IV Push every 6 hours PRN Anxiety/agitation  metoprolol tartrate Injectable 5 milliGRAM(s) IV Push every 6 hours PRN for heart rate greater than 110bpm      RADIOLOGY & ADDITIONAL TESTS:

## 2018-10-12 NOTE — CHART NOTE - NSCHARTNOTEFT_GEN_A_CORE
Source: Patient [ ]  Family [ ]   other [x ] EMR, frank, MD    Current Diet: puree with thin as per speech tx recommendations    Patient reports [ ] nausea  [ ] vomiting [ ] diarrhea [ ] constipation  [ ]chewing problems [ ] swallowing issues  [ ] other:     PO intake:  < 50% [ ]   50-75%  [ ]   %  [ ]  other : Pt pocketing food this AM during rounds    Source for PO intake [ ] Patient [ ] family [x ] chart [ x] staff [ ] other    Enteral /Parenteral Nutrition:     Current Weight: 9/28 103.9kg, 10/10 96.9kg, 10/11 95.4kg  question accuracy of admitting weight    % Weight Change     Pertinent Medications: MEDICATIONS  (STANDING):  ALBUTerol/ipratropium for Nebulization 3 milliLiter(s) Nebulizer every 6 hours  aspirin Suppository 300 milliGRAM(s) Rectal daily  atorvastatin 20 milliGRAM(s) Oral at bedtime  dextrose 5%. 1000 milliLiter(s) (50 mL/Hr) IV Continuous <Continuous>  dextrose 50% Injectable 12.5 Gram(s) IV Push once  dextrose 50% Injectable 25 Gram(s) IV Push once  dextrose 50% Injectable 25 Gram(s) IV Push once  folic acid 1 milliGRAM(s) Oral daily  heparin  Injectable 5000 Unit(s) SubCutaneous every 12 hours  influenza   Vaccine 0.5 milliLiter(s) IntraMuscular once  insulin lispro (HumaLOG) corrective regimen sliding scale   SubCutaneous three times a day before meals  insulin lispro (HumaLOG) corrective regimen sliding scale   SubCutaneous at bedtime  levETIRAcetam  IVPB 1000 milliGRAM(s) IV Intermittent every 12 hours  melatonin 3 milliGRAM(s) Oral at bedtime  NIFEdipine XL 30 milliGRAM(s) Oral daily  pantoprazole    Tablet 40 milliGRAM(s) Oral before breakfast  simethicone 80 milliGRAM(s) Chew four times a day    MEDICATIONS  (PRN):  acetaminophen   Tablet .. 650 milliGRAM(s) Oral every 6 hours PRN Temp greater or equal to 38C (100.4F)  aluminum hydroxide/magnesium hydroxide/simethicone Suspension 30 milliLiter(s) Oral every 6 hours PRN Dyspepsia  dextrose 40% Gel 15 Gram(s) Oral once PRN Blood Glucose LESS THAN 70 milliGRAM(s)/deciliter  glucagon  Injectable 1 milliGRAM(s) IntraMuscular once PRN Glucose LESS THAN 70 milligrams/deciliter  haloperidol    Injectable 2 milliGRAM(s) IntraMuscular every 6 hours PRN Aggitation  hydrALAZINE Injectable 10 milliGRAM(s) IV Push every 6 hours PRN for sbp above 160 mmhg  LORazepam   Injectable 2 milliGRAM(s) IV Push every 6 hours PRN Anxiety/agitation  metoprolol tartrate Injectable 5 milliGRAM(s) IV Push every 6 hours PRN for heart rate greater than 110bpm    Pertinent Labs: CBC Full  -  ( 12 Oct 2018 07:17 )  WBC Count : 5.9 K/uL  Hemoglobin : 9.7 g/dL  Hematocrit : 32.5 %  Platelet Count - Automated : 358 K/uL  Mean Cell Volume : 91.0 fl  Mean Cell Hemoglobin : 27.2 pg  Mean Cell Hemoglobin Concentration : 29.8 g/dL  Auto Neutrophil # : x  Auto Lymphocyte # : x  Auto Monocyte # : x  Auto Eosinophil # : x  Auto Basophil # : x  Auto Neutrophil % : x  Auto Lymphocyte % : x  Auto Monocyte % : x  Auto Eosinophil % : x  Auto Basophil % : x    10-12 Na143 mmol/L Glu 133 mg/dL<H> K+ 3.8 mmol/L Cr  1.09 mg/dL BUN 22.0 mg/dL<H> Phos n/a   Alb n/a   PAB n/a             Skin:     Nutrition focused physical exam not conducted - found signs of malnutrition [ ]absent [ ]present    Subcutaneous fat loss: [ ] Orbital fat pads region, [ ]Buccal fat region, [ ]Triceps region,  [ ]Ribs region    Muscle wasting: [ ]Temples region, [ ]Clavicle region, [ ]Shoulder region, [ ]Scapula region, [ ]Interosseous region,  [ ]thigh region, [ ]Calf region    Estimated Needs:   [x ] no change since previous assessment  [ ] recalculated:     Current Nutrition Diagnosis: · Pt with Increased nutrient needs related to increased physiological needs to promote healing as evidenced by presence of Klebsiella pneumoniae infection.  Pt pocketing puree food in mouth this AM. As per flow sheets pt eating poorly.       Recommendations: Continue to monitor tolerance, po intake. speech following    Monitoring and Evaluation:   [x ] PO intake [x ] Tolerance to diet prescription [X] Weights  [X] Follow up per protocol [X] Labs: Source: Patient [ ]  Family [ ]   other [x ] EMR, MD frank    Current Diet: puree with thin as per speech tx recommendations. Pt made NPO secondary to pocketing food    Patient reports [ ] nausea  [ ] vomiting [ ] diarrhea [ ] constipation  [ ]chewing problems [ ] swallowing issues  [ ] other:     PO intake:  < 50% [ ]   50-75%  [ ]   %  [ ]  other : Pt pocketing food this AM during rounds    Source for PO intake [ ] Patient [ ] family [x ] chart [ x] staff [ ] other    Enteral /Parenteral Nutrition:     Current Weight: 9/28 103.9kg, 10/10 96.9kg, 10/11 95.4kg  question accuracy of admitting weight    % Weight Change     Pertinent Medications: MEDICATIONS  (STANDING):  ALBUTerol/ipratropium for Nebulization 3 milliLiter(s) Nebulizer every 6 hours  aspirin Suppository 300 milliGRAM(s) Rectal daily  atorvastatin 20 milliGRAM(s) Oral at bedtime  dextrose 5%. 1000 milliLiter(s) (50 mL/Hr) IV Continuous <Continuous>  dextrose 50% Injectable 12.5 Gram(s) IV Push once  dextrose 50% Injectable 25 Gram(s) IV Push once  dextrose 50% Injectable 25 Gram(s) IV Push once  folic acid 1 milliGRAM(s) Oral daily  heparin  Injectable 5000 Unit(s) SubCutaneous every 12 hours  influenza   Vaccine 0.5 milliLiter(s) IntraMuscular once  insulin lispro (HumaLOG) corrective regimen sliding scale   SubCutaneous three times a day before meals  insulin lispro (HumaLOG) corrective regimen sliding scale   SubCutaneous at bedtime  levETIRAcetam  IVPB 1000 milliGRAM(s) IV Intermittent every 12 hours  melatonin 3 milliGRAM(s) Oral at bedtime  NIFEdipine XL 30 milliGRAM(s) Oral daily  pantoprazole    Tablet 40 milliGRAM(s) Oral before breakfast  simethicone 80 milliGRAM(s) Chew four times a day    MEDICATIONS  (PRN):  acetaminophen   Tablet .. 650 milliGRAM(s) Oral every 6 hours PRN Temp greater or equal to 38C (100.4F)  aluminum hydroxide/magnesium hydroxide/simethicone Suspension 30 milliLiter(s) Oral every 6 hours PRN Dyspepsia  dextrose 40% Gel 15 Gram(s) Oral once PRN Blood Glucose LESS THAN 70 milliGRAM(s)/deciliter  glucagon  Injectable 1 milliGRAM(s) IntraMuscular once PRN Glucose LESS THAN 70 milligrams/deciliter  haloperidol    Injectable 2 milliGRAM(s) IntraMuscular every 6 hours PRN Aggitation  hydrALAZINE Injectable 10 milliGRAM(s) IV Push every 6 hours PRN for sbp above 160 mmhg  LORazepam   Injectable 2 milliGRAM(s) IV Push every 6 hours PRN Anxiety/agitation  metoprolol tartrate Injectable 5 milliGRAM(s) IV Push every 6 hours PRN for heart rate greater than 110bpm    Pertinent Labs: CBC Full  -  ( 12 Oct 2018 07:17 )  WBC Count : 5.9 K/uL  Hemoglobin : 9.7 g/dL  Hematocrit : 32.5 %  Platelet Count - Automated : 358 K/uL  Mean Cell Volume : 91.0 fl  Mean Cell Hemoglobin : 27.2 pg  Mean Cell Hemoglobin Concentration : 29.8 g/dL  Auto Neutrophil # : x  Auto Lymphocyte # : x  Auto Monocyte # : x  Auto Eosinophil # : x  Auto Basophil # : x  Auto Neutrophil % : x  Auto Lymphocyte % : x  Auto Monocyte % : x  Auto Eosinophil % : x  Auto Basophil % : x    10-12 Na143 mmol/L Glu 133 mg/dL<H> K+ 3.8 mmol/L Cr  1.09 mg/dL BUN 22.0 mg/dL<H> Phos n/a   Alb n/a   PAB n/a             Skin:     Nutrition focused physical exam not conducted - found signs of malnutrition [ ]absent [ ]present    Subcutaneous fat loss: [ ] Orbital fat pads region, [ ]Buccal fat region, [ ]Triceps region,  [ ]Ribs region    Muscle wasting: [ ]Temples region, [ ]Clavicle region, [ ]Shoulder region, [ ]Scapula region, [ ]Interosseous region,  [ ]thigh region, [ ]Calf region    Estimated Needs:   [x ] no change since previous assessment  [ ] recalculated:     Current Nutrition Diagnosis: · Pt with Increased nutrient needs related to increased physiological needs to promote healing as evidenced by presence of Klebsiella pneumoniae infection.  Pt pocketing puree food in mouth this AM, now NPO.  As per flow sheets pt eating poorly.       Recommendations: Continue to monitor tolerance, po intake. speech following    Monitoring and Evaluation:   [x ] PO intake [x ] Tolerance to diet prescription [X] Weights  [X] Follow up per protocol [X] Labs:

## 2018-10-12 NOTE — PROGRESS NOTE ADULT - ASSESSMENT
68y Male with new seizure.    Seizure.   Agree with maintaining on Keppra for now  EEG as above.  Back to baseline mental status per niece at bedside.     Hypertension   Control blood pressure.    Lipid   Continue statin.     ID  Continues to have low grade fevers.   Sepsis work up and management per medicine.     Lytic bone lesion  Heme-Onc following.  Orthopedics had seen him as well.    Condition discussed with niece at bedside.     Neuro stable on keppra, will continue    No further in patient neuro workup suggested currently.    Thank you for allowing me to participate in the care of your patient    Marcus Barragan MD, PhD   [id number] Nursing/Physical Therapy

## 2018-10-12 NOTE — PROGRESS NOTE ADULT - SUBJECTIVE AND OBJECTIVE BOX
[facility]                                        Neurology at [place]                                 Yung Granado, & Fernando                                  [address]                                        [place], [place], [address]                                             [phone]        CC: seizure     HISTORY:  The patient is a 68y Male admitted 9/29/18 with sepsis.   He had prior admission for Klebsiella pneumonia and was sent home with PICC and antibiotics. At home he became more lethargic and had fevers and was brought back to the hospital. He was noted to have a lytic lesion of the hip and went to IR for biopsy. Rapid Response was called due to seizure activity. The patient was in the IR suite and had witnesses seizure activity as per IR staff. Patient was looking off in one directions and eyes rolled back and UE started to shake. He was given ativan and Neurology evaluation was called.  The patient reportedly has mental retardation and is non verbal at baseline but is able to follow simple instructions. He has no reported prior seizure history.     Interim history: No further seizures. No new neuro events    ROS:  Unobtainable due to patient's condition. Does not answer questions    MEDICATIONS  (STANDING):  ALBUTerol/ipratropium for Nebulization 3 milliLiter(s) Nebulizer every 6 hours  aspirin Suppository 300 milliGRAM(s) Rectal daily  atorvastatin 20 milliGRAM(s) Oral at bedtime  dextrose 5%. 1000 milliLiter(s) (50 mL/Hr) IV Continuous <Continuous>  dextrose 50% Injectable 12.5 Gram(s) IV Push once  dextrose 50% Injectable 25 Gram(s) IV Push once  dextrose 50% Injectable 25 Gram(s) IV Push once  folic acid 1 milliGRAM(s) Oral daily  heparin  Injectable 5000 Unit(s) SubCutaneous every 12 hours  influenza   Vaccine 0.5 milliLiter(s) IntraMuscular once  insulin lispro (HumaLOG) corrective regimen sliding scale   SubCutaneous three times a day before meals  insulin lispro (HumaLOG) corrective regimen sliding scale   SubCutaneous at bedtime  levETIRAcetam  IVPB 1000 milliGRAM(s) IV Intermittent every 12 hours  melatonin 3 milliGRAM(s) Oral at bedtime  NIFEdipine XL 30 milliGRAM(s) Oral daily  pantoprazole    Tablet 40 milliGRAM(s) Oral before breakfast  simethicone 80 milliGRAM(s) Chew four times a day    MEDICATIONS  (PRN):  acetaminophen   Tablet .. 650 milliGRAM(s) Oral every 6 hours PRN Temp greater or equal to 38C (100.4F)  aluminum hydroxide/magnesium hydroxide/simethicone Suspension 30 milliLiter(s) Oral every 6 hours PRN Dyspepsia  dextrose 40% Gel 15 Gram(s) Oral once PRN Blood Glucose LESS THAN 70 milliGRAM(s)/deciliter  glucagon  Injectable 1 milliGRAM(s) IntraMuscular once PRN Glucose LESS THAN 70 milligrams/deciliter  haloperidol    Injectable 2 milliGRAM(s) IntraMuscular every 6 hours PRN Aggitation  hydrALAZINE Injectable 10 milliGRAM(s) IV Push every 6 hours PRN for sbp above 160 mmhg  LORazepam   Injectable 2 milliGRAM(s) IV Push every 6 hours PRN Anxiety/agitation  metoprolol tartrate Injectable 5 milliGRAM(s) IV Push every 6 hours PRN for heart rate greater than 110bpm      Vital Signs Last 24 Hrs  T(C): 36.7 (12 Oct 2018 04:46), Max: 37.3 (11 Oct 2018 17:13)  T(F): 98 (12 Oct 2018 04:46), Max: 99.1 (11 Oct 2018 17:13)  HR: 71 (12 Oct 2018 09:10) (71 - 97)  BP: 112/55 (12 Oct 2018 04:46) (112/55 - 173/81)  BP(mean): --  RR: 18 (11 Oct 2018 21:17) (18 - 18)  SpO2: 92% (12 Oct 2018 09:10) (92% - 98%)    Detailed Neurologic Exam:    Mental status: The patient is awake and alert now. He speaks a bit to his niece at bedside, basically answers questions with one word.    Cranial nerves: Pupils equal and react symmetrically to light. There is no visual field deficit to threat. He tracks with gaze.  Facial musculature is symmetric. Palate elevates symmetrically. Tongue is midline.    Motor: There is normal bulk and tone.  There is no tremor.  He is moving both sides spontaneously.   Left leg limited at the knee.     Sensation: Responds to tactile stimuli bilaterally.    Reflexes: 1+throughout and plantar responses are flexor.    Cerebellar: Cannot be assessed.    Labs:                           9.7    5.9   )-----------( 358      ( 12 Oct 2018 07:17 )             32.5     10-12    143  |  103  |  22.0<H>  ----------------------------<  133<H>  3.8   |  28.0  |  1.09    Ca    9.1      12 Oct 2018 07:17  Mg     1.8     10-11    EEG no seizure, slow diffusely      RADIOLOGY   Repeat CT from 10/9 unable to fully interpret due to movement artifact.

## 2018-10-13 LAB
ANION GAP SERPL CALC-SCNC: 13 MMOL/L — SIGNIFICANT CHANGE UP (ref 5–17)
BUN SERPL-MCNC: 15 MG/DL — SIGNIFICANT CHANGE UP (ref 8–20)
CALCIUM SERPL-MCNC: 9.4 MG/DL — SIGNIFICANT CHANGE UP (ref 8.6–10.2)
CHLORIDE SERPL-SCNC: 101 MMOL/L — SIGNIFICANT CHANGE UP (ref 98–107)
CO2 SERPL-SCNC: 29 MMOL/L — SIGNIFICANT CHANGE UP (ref 22–29)
CREAT SERPL-MCNC: 0.73 MG/DL — SIGNIFICANT CHANGE UP (ref 0.5–1.3)
GLUCOSE BLDC GLUCOMTR-MCNC: 109 MG/DL — HIGH (ref 70–99)
GLUCOSE BLDC GLUCOMTR-MCNC: 115 MG/DL — HIGH (ref 70–99)
GLUCOSE BLDC GLUCOMTR-MCNC: 126 MG/DL — HIGH (ref 70–99)
GLUCOSE BLDC GLUCOMTR-MCNC: 149 MG/DL — HIGH (ref 70–99)
GLUCOSE SERPL-MCNC: 103 MG/DL — SIGNIFICANT CHANGE UP (ref 70–115)
MAGNESIUM SERPL-MCNC: 1.9 MG/DL — SIGNIFICANT CHANGE UP (ref 1.6–2.6)
POTASSIUM SERPL-MCNC: 3.6 MMOL/L — SIGNIFICANT CHANGE UP (ref 3.5–5.3)
POTASSIUM SERPL-SCNC: 3.6 MMOL/L — SIGNIFICANT CHANGE UP (ref 3.5–5.3)
SODIUM SERPL-SCNC: 143 MMOL/L — SIGNIFICANT CHANGE UP (ref 135–145)

## 2018-10-13 PROCEDURE — 99232 SBSQ HOSP IP/OBS MODERATE 35: CPT

## 2018-10-13 RX ADMIN — LEVETIRACETAM 400 MILLIGRAM(S): 250 TABLET, FILM COATED ORAL at 17:26

## 2018-10-13 RX ADMIN — SIMETHICONE 80 MILLIGRAM(S): 80 TABLET, CHEWABLE ORAL at 06:01

## 2018-10-13 RX ADMIN — PANTOPRAZOLE SODIUM 40 MILLIGRAM(S): 20 TABLET, DELAYED RELEASE ORAL at 06:01

## 2018-10-13 RX ADMIN — Medication 1 MILLIGRAM(S): at 13:51

## 2018-10-13 RX ADMIN — LEVETIRACETAM 400 MILLIGRAM(S): 250 TABLET, FILM COATED ORAL at 06:01

## 2018-10-13 RX ADMIN — Medication 3 MILLIGRAM(S): at 22:55

## 2018-10-13 RX ADMIN — SIMETHICONE 80 MILLIGRAM(S): 80 TABLET, CHEWABLE ORAL at 17:26

## 2018-10-13 RX ADMIN — HEPARIN SODIUM 5000 UNIT(S): 5000 INJECTION INTRAVENOUS; SUBCUTANEOUS at 17:26

## 2018-10-13 RX ADMIN — SIMETHICONE 80 MILLIGRAM(S): 80 TABLET, CHEWABLE ORAL at 22:55

## 2018-10-13 RX ADMIN — ATORVASTATIN CALCIUM 20 MILLIGRAM(S): 80 TABLET, FILM COATED ORAL at 22:55

## 2018-10-13 RX ADMIN — HEPARIN SODIUM 5000 UNIT(S): 5000 INJECTION INTRAVENOUS; SUBCUTANEOUS at 06:01

## 2018-10-13 RX ADMIN — Medication 300 MILLIGRAM(S): at 13:50

## 2018-10-13 RX ADMIN — Medication 30 MILLIGRAM(S): at 06:01

## 2018-10-13 RX ADMIN — SIMETHICONE 80 MILLIGRAM(S): 80 TABLET, CHEWABLE ORAL at 13:51

## 2018-10-13 NOTE — PROGRESS NOTE ADULT - ASSESSMENT
1) Toxic metabolic encephalopathy --> in setting of MR  --> asa and statin   --> resolved, back at baseline   --> unable to obtain MRI,   --> NPO, spoke to speech will do a re-evaluation   --> neuro following,     2) possible Seziures--> secondary to cva  --> eeg shows abnormal wave patters  --> c.w keppra     3) Lytic bone lesion of hip- Hematology following,  --> IR BIOPSY CANCELLED  --> will try biopsy for monday     4) Agitation /Anxiety-   --> c/w haldol /Ativan prn. Psych note reviewed.     5) Tibial Plateau fracture- C/w Brace. Ortho signed off, recommended outpatient f/u. NWB left LE, brace at all times. Needs home PT or [facility]    6) HLD- Continue statin Lipitor.    7) DM-II- Monitor FS, LSS.      8) HTN- C/w Nifedipine.    9)DVT-P- heparin sub q

## 2018-10-13 NOTE — PROGRESS NOTE ADULT - SUBJECTIVE AND OBJECTIVE BOX
YAMIL EMMIE    [id number]    68y      Male    INTERVAL HPI/OVERNIGHT EVENTS:    patient being seen for cva, lytic bone lesion and med management.  patient seen at bedside and in nad and is at baseline.       REVIEW OF SYSTEMS:    unable to obtain secondary to mental status       Vital Signs Last 24 Hrs  T(C): 36.4 (13 Oct 2018 10:15), Max: 36.9 (13 Oct 2018 04:25)  T(F): 97.6 (13 Oct 2018 10:15), Max: 98.5 (13 Oct 2018 04:25)  HR: 74 (13 Oct 2018 10:15) (72 - 82)  BP: 151/63 (13 Oct 2018 10:15) (123/62 - 153/70)  BP(mean): --  RR: 20 (13 Oct 2018 10:15) (18 - 20)  SpO2: 95% (12 Oct 2018 12:58) (95% - 95%)    PHYSICAL EXAM:    GENERAL:, MR,   HEAD:  Atraumatic, Normocephalic  ENMT:  Moist mucous membranes,  No lesions  NERVOUS SYSTEM:  awake   CHEST/LUNG: clear  HEART: Regular rate and rhythm; No murmurs,   ABDOMEN: Soft, Nontender, Nondistended;  EXTREMITIES:  left leg brace  SKIN: Skin tear left elbow, clean, dry      LABS:                        9.7    5.9   )-----------( 358      ( 12 Oct 2018 07:17 )             32.5     10-13    143  |  101  |  15.0  ----------------------------<  103  3.6   |  29.0  |  0.73    Ca    9.4      13 Oct 2018 08:26  Mg     1.9     10-13          MEDICATIONS  (STANDING):  aspirin Suppository 300 milliGRAM(s) Rectal daily  atorvastatin 20 milliGRAM(s) Oral at bedtime  dextrose 5%. 1000 milliLiter(s) (50 mL/Hr) IV Continuous <Continuous>  dextrose 50% Injectable 12.5 Gram(s) IV Push once  dextrose 50% Injectable 25 Gram(s) IV Push once  dextrose 50% Injectable 25 Gram(s) IV Push once  folic acid 1 milliGRAM(s) Oral daily  heparin  Injectable 5000 Unit(s) SubCutaneous every 12 hours  influenza   Vaccine 0.5 milliLiter(s) IntraMuscular once  insulin lispro (HumaLOG) corrective regimen sliding scale   SubCutaneous three times a day before meals  insulin lispro (HumaLOG) corrective regimen sliding scale   SubCutaneous at bedtime  levETIRAcetam  IVPB 1000 milliGRAM(s) IV Intermittent every 12 hours  melatonin 3 milliGRAM(s) Oral at bedtime  NIFEdipine XL 30 milliGRAM(s) Oral daily  pantoprazole    Tablet 40 milliGRAM(s) Oral before breakfast  simethicone 80 milliGRAM(s) Chew four times a day    MEDICATIONS  (PRN):  acetaminophen   Tablet .. 650 milliGRAM(s) Oral every 6 hours PRN Temp greater or equal to 38C (100.4F)  aluminum hydroxide/magnesium hydroxide/simethicone Suspension 30 milliLiter(s) Oral every 6 hours PRN Dyspepsia  dextrose 40% Gel 15 Gram(s) Oral once PRN Blood Glucose LESS THAN 70 milliGRAM(s)/deciliter  glucagon  Injectable 1 milliGRAM(s) IntraMuscular once PRN Glucose LESS THAN 70 milligrams/deciliter  haloperidol    Injectable 2 milliGRAM(s) IntraMuscular every 6 hours PRN Aggitation  hydrALAZINE Injectable 10 milliGRAM(s) IV Push every 6 hours PRN for sbp above 160 mmhg  LORazepam   Injectable 2 milliGRAM(s) IV Push every 6 hours PRN Anxiety/agitation  metoprolol tartrate Injectable 5 milliGRAM(s) IV Push every 6 hours PRN for heart rate greater than 110bpm      RADIOLOGY & ADDITIONAL TESTS:

## 2018-10-14 LAB
CULTURE RESULTS: SIGNIFICANT CHANGE UP
CULTURE RESULTS: SIGNIFICANT CHANGE UP
GLUCOSE BLDC GLUCOMTR-MCNC: 109 MG/DL — HIGH (ref 70–99)
GLUCOSE BLDC GLUCOMTR-MCNC: 114 MG/DL — HIGH (ref 70–99)
GLUCOSE BLDC GLUCOMTR-MCNC: 119 MG/DL — HIGH (ref 70–99)
GLUCOSE BLDC GLUCOMTR-MCNC: 147 MG/DL — HIGH (ref 70–99)
SPECIMEN SOURCE: SIGNIFICANT CHANGE UP
SPECIMEN SOURCE: SIGNIFICANT CHANGE UP

## 2018-10-14 PROCEDURE — 99232 SBSQ HOSP IP/OBS MODERATE 35: CPT

## 2018-10-14 RX ADMIN — SIMETHICONE 80 MILLIGRAM(S): 80 TABLET, CHEWABLE ORAL at 13:47

## 2018-10-14 RX ADMIN — SIMETHICONE 80 MILLIGRAM(S): 80 TABLET, CHEWABLE ORAL at 22:27

## 2018-10-14 RX ADMIN — Medication 1 MILLIGRAM(S): at 13:47

## 2018-10-14 RX ADMIN — PANTOPRAZOLE SODIUM 40 MILLIGRAM(S): 20 TABLET, DELAYED RELEASE ORAL at 06:19

## 2018-10-14 RX ADMIN — HEPARIN SODIUM 5000 UNIT(S): 5000 INJECTION INTRAVENOUS; SUBCUTANEOUS at 18:05

## 2018-10-14 RX ADMIN — SIMETHICONE 80 MILLIGRAM(S): 80 TABLET, CHEWABLE ORAL at 18:05

## 2018-10-14 RX ADMIN — LEVETIRACETAM 400 MILLIGRAM(S): 250 TABLET, FILM COATED ORAL at 18:05

## 2018-10-14 RX ADMIN — Medication 3 MILLIGRAM(S): at 22:27

## 2018-10-14 RX ADMIN — SIMETHICONE 80 MILLIGRAM(S): 80 TABLET, CHEWABLE ORAL at 06:19

## 2018-10-14 RX ADMIN — HEPARIN SODIUM 5000 UNIT(S): 5000 INJECTION INTRAVENOUS; SUBCUTANEOUS at 06:19

## 2018-10-14 RX ADMIN — Medication 30 MILLIGRAM(S): at 06:19

## 2018-10-14 RX ADMIN — Medication 300 MILLIGRAM(S): at 18:05

## 2018-10-14 RX ADMIN — ATORVASTATIN CALCIUM 20 MILLIGRAM(S): 80 TABLET, FILM COATED ORAL at 22:27

## 2018-10-14 RX ADMIN — LEVETIRACETAM 400 MILLIGRAM(S): 250 TABLET, FILM COATED ORAL at 06:18

## 2018-10-14 NOTE — PROGRESS NOTE ADULT - SUBJECTIVE AND OBJECTIVE BOX
YAMIL OLEA    [id number]    68y      Male    INTERVAL HPI/OVERNIGHT EVENTS:    patient being seen for CVA,  lytic bone lesion on pelvis and med management. patient seen at bedside and in nad. patient is awake.       REVIEW OF SYSTEMS:    unable to obtain secondary to mental status       Vital Signs Last 24 Hrs  T(C): 37.1 (14 Oct 2018 05:34), Max: 37.1 (14 Oct 2018 05:34)  T(F): 98.7 (14 Oct 2018 05:34), Max: 98.7 (14 Oct 2018 05:34)  HR: 79 (14 Oct 2018 05:34) (79 - 99)  BP: 149/74 (14 Oct 2018 05:34) (149/74 - 153/81)  BP(mean): --  RR: 20 (13 Oct 2018 20:40) (20 - 20)  SpO2: 93% (13 Oct 2018 16:00) (93% - 93%)    PHYSICAL EXAM:    GENERAL:, MR,   HEAD:  Atraumatic, Normocephalic  ENMT:  Moist mucous membranes,  No lesions  NERVOUS SYSTEM:  awake   CHEST/LUNG: clear  HEART: Regular rate and rhythm; No murmurs,   ABDOMEN: Soft, Nontender, Nondistended;  EXTREMITIES:  left leg brace    LABS:    10-13    143  |  101  |  15.0  ----------------------------<  103  3.6   |  29.0  |  0.73    Ca    9.4      13 Oct 2018 08:26  Mg     1.9     10-13              MEDICATIONS  (STANDING):  aspirin Suppository 300 milliGRAM(s) Rectal daily  atorvastatin 20 milliGRAM(s) Oral at bedtime  dextrose 5%. 1000 milliLiter(s) (50 mL/Hr) IV Continuous <Continuous>  dextrose 50% Injectable 12.5 Gram(s) IV Push once  dextrose 50% Injectable 25 Gram(s) IV Push once  dextrose 50% Injectable 25 Gram(s) IV Push once  folic acid 1 milliGRAM(s) Oral daily  heparin  Injectable 5000 Unit(s) SubCutaneous every 12 hours  influenza   Vaccine 0.5 milliLiter(s) IntraMuscular once  insulin lispro (HumaLOG) corrective regimen sliding scale   SubCutaneous three times a day before meals  insulin lispro (HumaLOG) corrective regimen sliding scale   SubCutaneous at bedtime  levETIRAcetam  IVPB 1000 milliGRAM(s) IV Intermittent every 12 hours  melatonin 3 milliGRAM(s) Oral at bedtime  NIFEdipine XL 30 milliGRAM(s) Oral daily  pantoprazole    Tablet 40 milliGRAM(s) Oral before breakfast  simethicone 80 milliGRAM(s) Chew four times a day    MEDICATIONS  (PRN):  acetaminophen   Tablet .. 650 milliGRAM(s) Oral every 6 hours PRN Temp greater or equal to 38C (100.4F)  aluminum hydroxide/magnesium hydroxide/simethicone Suspension 30 milliLiter(s) Oral every 6 hours PRN Dyspepsia  dextrose 40% Gel 15 Gram(s) Oral once PRN Blood Glucose LESS THAN 70 milliGRAM(s)/deciliter  glucagon  Injectable 1 milliGRAM(s) IntraMuscular once PRN Glucose LESS THAN 70 milligrams/deciliter  haloperidol    Injectable 2 milliGRAM(s) IntraMuscular every 6 hours PRN Aggitation  hydrALAZINE Injectable 10 milliGRAM(s) IV Push every 6 hours PRN for sbp above 160 mmhg  LORazepam   Injectable 2 milliGRAM(s) IV Push every 6 hours PRN Anxiety/agitation  metoprolol tartrate Injectable 5 milliGRAM(s) IV Push every 6 hours PRN for heart rate greater than 110bpm      RADIOLOGY & ADDITIONAL TESTS:

## 2018-10-14 NOTE — PROGRESS NOTE ADULT - SUBJECTIVE AND OBJECTIVE BOX
[facility]                                        Neurology at [place]                                 Yung Granado, & Fernando                                  [address]                                        [place], [place], [address]                                             [phone]        CC: seizure     HISTORY:  The patient is a 68y Male admitted 9/29/18 with sepsis.   He had prior admission for Klebsiella pneumonia and was sent home with PICC and antibiotics. At home he became more lethargic and had fevers and was brought back to the hospital. He was noted to have a lytic lesion of the hip and went to IR for biopsy. Rapid Response was called due to seizure activity. The patient was in the IR suite and had witnesses seizure activity as per IR staff. Patient was looking off in one directions and eyes rolled back and UE started to shake. He was given ativan and Neurology evaluation was called.  The patient reportedly has mental retardation and is non verbal at baseline but is able to follow simple instructions. He has no reported prior seizure history.     Interim history: No further seizures. No new neuro events.  Much more alert today.    ROS:  Unobtainable due to patient's condition. Although alert, he does not answer questions even in Nepali      MEDICATIONS  (STANDING):  aspirin Suppository 300 milliGRAM(s) Rectal daily  atorvastatin 20 milliGRAM(s) Oral at bedtime  dextrose 5%. 1000 milliLiter(s) (50 mL/Hr) IV Continuous <Continuous>  dextrose 50% Injectable 12.5 Gram(s) IV Push once  dextrose 50% Injectable 25 Gram(s) IV Push once  dextrose 50% Injectable 25 Gram(s) IV Push once  folic acid 1 milliGRAM(s) Oral daily  heparin  Injectable 5000 Unit(s) SubCutaneous every 12 hours  influenza   Vaccine 0.5 milliLiter(s) IntraMuscular once  insulin lispro (HumaLOG) corrective regimen sliding scale   SubCutaneous three times a day before meals  insulin lispro (HumaLOG) corrective regimen sliding scale   SubCutaneous at bedtime  levETIRAcetam  IVPB 1000 milliGRAM(s) IV Intermittent every 12 hours  melatonin 3 milliGRAM(s) Oral at bedtime  NIFEdipine XL 30 milliGRAM(s) Oral daily  pantoprazole    Tablet 40 milliGRAM(s) Oral before breakfast  simethicone 80 milliGRAM(s) Chew four times a day    MEDICATIONS  (PRN):  acetaminophen   Tablet .. 650 milliGRAM(s) Oral every 6 hours PRN Temp greater or equal to 38C (100.4F)  aluminum hydroxide/magnesium hydroxide/simethicone Suspension 30 milliLiter(s) Oral every 6 hours PRN Dyspepsia  dextrose 40% Gel 15 Gram(s) Oral once PRN Blood Glucose LESS THAN 70 milliGRAM(s)/deciliter  glucagon  Injectable 1 milliGRAM(s) IntraMuscular once PRN Glucose LESS THAN 70 milligrams/deciliter  haloperidol    Injectable 2 milliGRAM(s) IntraMuscular every 6 hours PRN Aggitation  hydrALAZINE Injectable 10 milliGRAM(s) IV Push every 6 hours PRN for sbp above 160 mmhg  LORazepam   Injectable 2 milliGRAM(s) IV Push every 6 hours PRN Anxiety/agitation  metoprolol tartrate Injectable 5 milliGRAM(s) IV Push every 6 hours PRN for heart rate greater than 110bpm      Vital Signs Last 24 Hrs  T(C): 36.8 (14 Oct 2018 12:35), Max: 37.1 (14 Oct 2018 05:34)  T(F): 98.3 (14 Oct 2018 12:35), Max: 98.7 (14 Oct 2018 05:34)  HR: 91 (14 Oct 2018 12:35) (79 - 99)  BP: 121/70 (14 Oct 2018 12:35) (121/70 - 153/81)  BP(mean): --  RR: 20 (14 Oct 2018 12:35) (20 - 20)  SpO2: 98% (14 Oct 2018 12:35) (93% - 98%)    Detailed Neurologic Exam:    Mental status: The patient is awake and alert now. He speaks a bit  at bedside, not answering questions. follows simple commands inconsistently    Cranial nerves: Pupils equal and react symmetrically to light. There is no visual field deficit to threat. He tracks with gaze.  Facial musculature is symmetric. Palate elevates symmetrically. Tongue is midline.    Motor: There is normal bulk and tone.  There is no tremor.  He is moving both sides spontaneously.   Left leg limited at the knee by brace    Sensation: Responds to tactile stimuli bilaterally.    Reflexes: 1+throughout and plantar responses are flexor, did not test left patellar DTR.    Cerebellar: Cannot be assessed.    Labs:       10-13    143  |  101  |  15.0  ----------------------------<  103  3.6   |  29.0  |  0.73    Ca    9.4      13 Oct 2018 08:26  Mg     1.9     10-13    EEG no seizure, slow diffusely      RADIOLOGY   Repeat CT from 10/9 unable to fully interpret due to movement artifact.

## 2018-10-14 NOTE — PROGRESS NOTE ADULT - ASSESSMENT
68y Male with new seizure.    Seizure.   Agree with maintaining on Keppra for now  EEG as above.  Back to baseline mental status.     Hypertension   Control blood pressure- well controlled 121/70.    Lipid   Continue statin.     Neuro stable on keppra, will continue    No further in patient neuro workup suggested currently.    Thank you for allowing me to participate in the care of your patient    Marcus Barragan MD, PhD   [id number]

## 2018-10-14 NOTE — PROGRESS NOTE ADULT - ASSESSMENT
1) Toxic metabolic encephalopathy --> in setting of MR  --> asa and statin   --> resolved, back at baseline   --> unable to obtain MRI,   --> modified diet     2) possible Seziures--> secondary to cva  --> eeg shows abnormal wave patterns  --> c.w keppra     3) Lytic bone lesion of hip- Hematology following,  --> IR BIOPSY CANCELLED due to rapid response   --> will try biopsy for monday , will call IR tomorrow     4) Agitation /Anxiety-   --> c/w haldol /Ativan prn. Psych note reviewed.     5) Tibial Plateau fracture- C/w Brace. Ortho signed off, recommended outpatient f/u. NWB left LE, brace at all times. Needs home PT or PR[facility]    6) HLD- Continue statin Lipitor.    7) DM-II- Monitor FS, LSS.      8) HTN- C/w Nifedipine.    9)DVT-P- heparin sub q    dispo --> dc to [facility] once biopsy is done

## 2018-10-15 LAB
ANION GAP SERPL CALC-SCNC: 12 MMOL/L — SIGNIFICANT CHANGE UP (ref 5–17)
APPEARANCE UR: CLEAR — SIGNIFICANT CHANGE UP
BACTERIA # UR AUTO: ABNORMAL
BILIRUB UR-MCNC: NEGATIVE — SIGNIFICANT CHANGE UP
BUN SERPL-MCNC: 12 MG/DL — SIGNIFICANT CHANGE UP (ref 8–20)
CALCIUM SERPL-MCNC: 9.7 MG/DL — SIGNIFICANT CHANGE UP (ref 8.6–10.2)
CHLORIDE SERPL-SCNC: 102 MMOL/L — SIGNIFICANT CHANGE UP (ref 98–107)
CO2 SERPL-SCNC: 28 MMOL/L — SIGNIFICANT CHANGE UP (ref 22–29)
COLOR SPEC: YELLOW — SIGNIFICANT CHANGE UP
CREAT SERPL-MCNC: 0.77 MG/DL — SIGNIFICANT CHANGE UP (ref 0.5–1.3)
DIFF PNL FLD: ABNORMAL
EPI CELLS # UR: SIGNIFICANT CHANGE UP
GLUCOSE BLDC GLUCOMTR-MCNC: 107 MG/DL — HIGH (ref 70–99)
GLUCOSE BLDC GLUCOMTR-MCNC: 117 MG/DL — HIGH (ref 70–99)
GLUCOSE BLDC GLUCOMTR-MCNC: 125 MG/DL — HIGH (ref 70–99)
GLUCOSE BLDC GLUCOMTR-MCNC: 157 MG/DL — HIGH (ref 70–99)
GLUCOSE SERPL-MCNC: 113 MG/DL — SIGNIFICANT CHANGE UP (ref 70–115)
GLUCOSE UR QL: NEGATIVE MG/DL — SIGNIFICANT CHANGE UP
GRAN CASTS # UR COMP ASSIST: ABNORMAL /LPF
HCT VFR BLD CALC: 36.7 % — LOW (ref 42–52)
HGB BLD-MCNC: 11.4 G/DL — LOW (ref 14–18)
KETONES UR-MCNC: NEGATIVE — SIGNIFICANT CHANGE UP
LEUKOCYTE ESTERASE UR-ACNC: ABNORMAL
MAGNESIUM SERPL-MCNC: 1.7 MG/DL — LOW (ref 1.8–2.6)
MCHC RBC-ENTMCNC: 27.8 PG — SIGNIFICANT CHANGE UP (ref 27–31)
MCHC RBC-ENTMCNC: 31.1 G/DL — LOW (ref 32–36)
MCV RBC AUTO: 89.5 FL — SIGNIFICANT CHANGE UP (ref 80–94)
NITRITE UR-MCNC: NEGATIVE — SIGNIFICANT CHANGE UP
PH UR: 6 — SIGNIFICANT CHANGE UP (ref 5–8)
PLATELET # BLD AUTO: 311 K/UL — SIGNIFICANT CHANGE UP (ref 150–400)
POTASSIUM SERPL-MCNC: 3.6 MMOL/L — SIGNIFICANT CHANGE UP (ref 3.5–5.3)
POTASSIUM SERPL-SCNC: 3.6 MMOL/L — SIGNIFICANT CHANGE UP (ref 3.5–5.3)
PROT UR-MCNC: 500 MG/DL
RBC # BLD: 4.1 M/UL — LOW (ref 4.6–6.2)
RBC # FLD: 15.9 % — HIGH (ref 11–15.6)
RBC CASTS # UR COMP ASSIST: SIGNIFICANT CHANGE UP /HPF (ref 0–4)
SODIUM SERPL-SCNC: 142 MMOL/L — SIGNIFICANT CHANGE UP (ref 135–145)
SP GR SPEC: 1.01 — SIGNIFICANT CHANGE UP (ref 1.01–1.02)
UROBILINOGEN FLD QL: 1 MG/DL
WBC # BLD: 7.8 K/UL — SIGNIFICANT CHANGE UP (ref 4.8–10.8)
WBC # FLD AUTO: 7.8 K/UL — SIGNIFICANT CHANGE UP (ref 4.8–10.8)
WBC UR QL: ABNORMAL

## 2018-10-15 PROCEDURE — 99232 SBSQ HOSP IP/OBS MODERATE 35: CPT

## 2018-10-15 RX ORDER — LANOLIN ALCOHOL/MO/W.PET/CERES
1 CREAM (GRAM) TOPICAL
Qty: 0 | Refills: 0 | COMMUNITY
Start: 2018-10-15

## 2018-10-15 RX ORDER — ASPIRIN/CALCIUM CARB/MAGNESIUM 324 MG
325 TABLET ORAL DAILY
Qty: 0 | Refills: 0 | Status: DISCONTINUED | OUTPATIENT
Start: 2018-10-15 | End: 2018-10-17

## 2018-10-15 RX ORDER — SIMETHICONE 80 MG/1
1 TABLET, CHEWABLE ORAL
Qty: 0 | Refills: 0 | COMMUNITY
Start: 2018-10-15

## 2018-10-15 RX ORDER — LEVETIRACETAM 250 MG/1
1 TABLET, FILM COATED ORAL
Qty: 60 | Refills: 0 | OUTPATIENT
Start: 2018-10-15 | End: 2018-11-13

## 2018-10-15 RX ORDER — NIFEDIPINE 30 MG
1 TABLET, EXTENDED RELEASE 24 HR ORAL
Qty: 0 | Refills: 0 | COMMUNITY
Start: 2018-10-15

## 2018-10-15 RX ORDER — PANTOPRAZOLE SODIUM 20 MG/1
1 TABLET, DELAYED RELEASE ORAL
Qty: 0 | Refills: 0 | COMMUNITY
Start: 2018-10-15

## 2018-10-15 RX ORDER — MAGNESIUM SULFATE 500 MG/ML
1 VIAL (ML) INJECTION ONCE
Qty: 0 | Refills: 0 | Status: COMPLETED | OUTPATIENT
Start: 2018-10-15 | End: 2018-10-15

## 2018-10-15 RX ADMIN — Medication 1 MILLIGRAM(S): at 13:46

## 2018-10-15 RX ADMIN — PANTOPRAZOLE SODIUM 40 MILLIGRAM(S): 20 TABLET, DELAYED RELEASE ORAL at 05:39

## 2018-10-15 RX ADMIN — SIMETHICONE 80 MILLIGRAM(S): 80 TABLET, CHEWABLE ORAL at 13:17

## 2018-10-15 RX ADMIN — Medication 100 GRAM(S): at 17:31

## 2018-10-15 RX ADMIN — Medication 650 MILLIGRAM(S): at 14:30

## 2018-10-15 RX ADMIN — HEPARIN SODIUM 5000 UNIT(S): 5000 INJECTION INTRAVENOUS; SUBCUTANEOUS at 05:38

## 2018-10-15 RX ADMIN — SIMETHICONE 80 MILLIGRAM(S): 80 TABLET, CHEWABLE ORAL at 05:38

## 2018-10-15 RX ADMIN — LEVETIRACETAM 400 MILLIGRAM(S): 250 TABLET, FILM COATED ORAL at 05:38

## 2018-10-15 RX ADMIN — SIMETHICONE 80 MILLIGRAM(S): 80 TABLET, CHEWABLE ORAL at 17:22

## 2018-10-15 RX ADMIN — Medication 650 MILLIGRAM(S): at 13:46

## 2018-10-15 RX ADMIN — LEVETIRACETAM 400 MILLIGRAM(S): 250 TABLET, FILM COATED ORAL at 17:22

## 2018-10-15 RX ADMIN — Medication 2: at 17:25

## 2018-10-15 RX ADMIN — Medication 3 MILLIGRAM(S): at 22:42

## 2018-10-15 RX ADMIN — Medication 1 MILLIGRAM(S): at 13:17

## 2018-10-15 RX ADMIN — Medication 30 MILLIGRAM(S): at 05:38

## 2018-10-15 RX ADMIN — ATORVASTATIN CALCIUM 20 MILLIGRAM(S): 80 TABLET, FILM COATED ORAL at 22:42

## 2018-10-15 RX ADMIN — SIMETHICONE 80 MILLIGRAM(S): 80 TABLET, CHEWABLE ORAL at 22:41

## 2018-10-15 RX ADMIN — Medication 325 MILLIGRAM(S): at 17:22

## 2018-10-15 NOTE — PROGRESS NOTE ADULT - ASSESSMENT
1) Toxic metabolic encephalopathy --> in setting of MR  --> asa and statin   --> resolved, back at baseline   --> unable to obtain MRI due to inability to follow commands,   --> modified diet     2) possible Seziures--> secondary to cva  --> eeg shows abnormal wave patterns  --> c.w keppra     3) Lytic bone lesion of hip- Hematology following,  --> IR BIOPSY CANCELLED due to rapid response   --> npo pmn for biopsy tomorrow     4) Agitation /Anxiety-   --> c/w haldol /Ativan prn. Psych note reviewed.     5) Tibial Plateau fracture- C/w Brace. Ortho signed off, recommended outpatient f/u. NWB left LE, brace at all times. Needs home PT or PR[facility]    6) HLD- Continue statin Lipitor.    7) DM-II- Monitor FS, LSS.      8) HTN- C/w Nifedipine.    9)DVT-P- hold heparin sub q  please reinstate tomorrow after biopsy     dispo --> dc to [facility] once biopsy is done

## 2018-10-15 NOTE — PROGRESS NOTE ADULT - SUBJECTIVE AND OBJECTIVE BOX
YAMIL EMMIE    [id number]    68y      Male    INTERVAL HPI/OVERNIGHT EVENTS:    off service note:  67 y/o male with DM, hyperlipidemia, anemia and  MR as per history was discharged on 9/27/18 from [facility] after being diagnosed with Klebsiella pneumoniae sepsis and was sent home with PICC line and AVYCAZ till 10/1/18 was brought back to ER by family as he was somewhat more agitated than his baseline. When pt. arrived to ER he had fever of 101.6, code sepsis was called, pt's wbc on this vist were 23.9 and were within normal range prior to discharge on 9/27/18. pt's ct chest abdomen and pelvis was done in ER and did not show any focus of infection but did show lytic lesions in the pelvis.  ct head did not show acute findings as well. no other complaints at the time of admission. As per record pt. was seen by ortho service on august 25th for left tibial plateau fracture and brace and NWB LLE was recommended and out- pt. ortho follow up.     The patient was continued on intravenous antibiotics which were started on the prior admission and was seen by Infectious Disease in consultation. Repeat laboratory studies the following day noted resolution of the leukocytosis. The patient developed agitation overnight 9/29 and the [place] brace was damaged and he pulled out his PICC line. Seen by psych for AMS and agitation, placed on haldol PRN. Given proteinuria and lytic lesions found on CT scan the patient was seen by hematology oncology for possible plasma cell dyscrasia work up. Biopsy planned for 10/8 postponed till 10/9 @ 11am per IR,    Patient was then a code rapid response on 10/9 for ams, low grade fever and seizure like behavior. Patient started on asa aand iv keppra and ct head showed:    IMPRESSION:         Limited study secondary to motion artifact. Motion artifact versus low   attenuation areas in the left frontotemporal lobe may represent acute   infarct in proper clinical setting. Further evaluation with MR is   recommended.  Moderate volume loss and small vessel ischemic changes.    Patient then had a eeg which was abnormal but did not show seizure like activity but continued on keppra. Patient improved clinically and is now ready for repeat IR biopsy tomorrow. patient seen at bedside and in nad.         REVIEW OF SYSTEMS:    unable to obtain secondary to mental status       Vital Signs Last 24 Hrs  T(C): 37.9 (15 Oct 2018 13:27), Max: 37.9 (15 Oct 2018 13:27)  T(F): 100.2 (15 Oct 2018 13:27), Max: 100.2 (15 Oct 2018 13:27)  HR: 114 (15 Oct 2018 13:27) (80 - 114)  BP: 151/99 (15 Oct 2018 13:27) (136/69 - 155/75)  BP(mean): --  RR: 22 (15 Oct 2018 13:27) (18 - 22)  SpO2: 94% (14 Oct 2018 20:51) (94% - 94%)    PHYSICAL EXAM:    GENERAL:, MR,   HEAD:  Atraumatic, Normocephalic  ENMT:  Moist mucous membranes,  No lesions  NERVOUS SYSTEM:  awake   CHEST/LUNG: clear  HEART: Regular rate and rhythm; No murmurs,   ABDOMEN: Soft, Nontender, Nondistended;  EXTREMITIES:  left leg brace      LABS:                        11.4   7.8   )-----------( 311      ( 15 Oct 2018 08:33 )             36.7     10-15    142  |  102  |  12.0  ----------------------------<  113  3.6   |  28.0  |  0.77    Ca    9.7      15 Oct 2018 08:33  Mg     1.7     10-15      MEDICATIONS  (STANDING):  aspirin 325 milliGRAM(s) Oral daily  atorvastatin 20 milliGRAM(s) Oral at bedtime  dextrose 5%. 1000 milliLiter(s) (50 mL/Hr) IV Continuous <Continuous>  dextrose 50% Injectable 12.5 Gram(s) IV Push once  dextrose 50% Injectable 25 Gram(s) IV Push once  dextrose 50% Injectable 25 Gram(s) IV Push once  folic acid 1 milliGRAM(s) Oral daily  influenza   Vaccine 0.5 milliLiter(s) IntraMuscular once  insulin lispro (HumaLOG) corrective regimen sliding scale   SubCutaneous three times a day before meals  insulin lispro (HumaLOG) corrective regimen sliding scale   SubCutaneous at bedtime  levETIRAcetam  IVPB 1000 milliGRAM(s) IV Intermittent every 12 hours  magnesium sulfate  IVPB 1 Gram(s) IV Intermittent once  melatonin 3 milliGRAM(s) Oral at bedtime  NIFEdipine XL 30 milliGRAM(s) Oral daily  pantoprazole    Tablet 40 milliGRAM(s) Oral before breakfast  simethicone 80 milliGRAM(s) Chew four times a day    MEDICATIONS  (PRN):  acetaminophen   Tablet .. 650 milliGRAM(s) Oral every 6 hours PRN Temp greater or equal to 38C (100.4F)  aluminum hydroxide/magnesium hydroxide/simethicone Suspension 30 milliLiter(s) Oral every 6 hours PRN Dyspepsia  dextrose 40% Gel 15 Gram(s) Oral once PRN Blood Glucose LESS THAN 70 milliGRAM(s)/deciliter  glucagon  Injectable 1 milliGRAM(s) IntraMuscular once PRN Glucose LESS THAN 70 milligrams/deciliter  haloperidol    Injectable 2 milliGRAM(s) IntraMuscular every 6 hours PRN Aggitation  hydrALAZINE Injectable 10 milliGRAM(s) IV Push every 6 hours PRN for sbp above 160 mmhg  metoprolol tartrate Injectable 5 milliGRAM(s) IV Push every 6 hours PRN for heart rate greater than 110bpm      RADIOLOGY & ADDITIONAL TESTS:

## 2018-10-16 LAB
CHOLEST SERPL-MCNC: 169 MG/DL — SIGNIFICANT CHANGE UP (ref 110–199)
CULTURE RESULTS: NO GROWTH — SIGNIFICANT CHANGE UP
GLUCOSE BLDC GLUCOMTR-MCNC: 115 MG/DL — HIGH (ref 70–99)
GLUCOSE BLDC GLUCOMTR-MCNC: 120 MG/DL — HIGH (ref 70–99)
GLUCOSE BLDC GLUCOMTR-MCNC: 133 MG/DL — HIGH (ref 70–99)
GLUCOSE BLDC GLUCOMTR-MCNC: 145 MG/DL — HIGH (ref 70–99)
HDLC SERPL-MCNC: 33 MG/DL — LOW
LIPID PNL WITH DIRECT LDL SERPL: 106 MG/DL — SIGNIFICANT CHANGE UP
SPECIMEN SOURCE: SIGNIFICANT CHANGE UP
TOTAL CHOLESTEROL/HDL RATIO MEASUREMENT: 5 RATIO — SIGNIFICANT CHANGE UP (ref 3.4–9.6)
TRIGL SERPL-MCNC: 152 MG/DL — SIGNIFICANT CHANGE UP (ref 10–200)

## 2018-10-16 PROCEDURE — 99233 SBSQ HOSP IP/OBS HIGH 50: CPT

## 2018-10-16 RX ADMIN — ATORVASTATIN CALCIUM 20 MILLIGRAM(S): 80 TABLET, FILM COATED ORAL at 21:48

## 2018-10-16 RX ADMIN — Medication 1 MILLIGRAM(S): at 13:12

## 2018-10-16 RX ADMIN — LEVETIRACETAM 400 MILLIGRAM(S): 250 TABLET, FILM COATED ORAL at 18:31

## 2018-10-16 RX ADMIN — SIMETHICONE 80 MILLIGRAM(S): 80 TABLET, CHEWABLE ORAL at 13:12

## 2018-10-16 RX ADMIN — Medication 3 MILLIGRAM(S): at 21:48

## 2018-10-16 RX ADMIN — LEVETIRACETAM 400 MILLIGRAM(S): 250 TABLET, FILM COATED ORAL at 06:23

## 2018-10-16 RX ADMIN — Medication 325 MILLIGRAM(S): at 13:13

## 2018-10-16 RX ADMIN — Medication 30 MILLIGRAM(S): at 06:23

## 2018-10-16 NOTE — PROGRESS NOTE ADULT - PROVIDER SPECIALTY LIST ADULT
Cardiology
Heme/Onc
Heme/Onc
Hospitalist
Infectious Disease
Infectious Disease
Internal Medicine
Intervent Radiology
Neurology
Orthopedics
Orthopedics
Hospitalist
Infectious Disease

## 2018-10-16 NOTE — PROGRESS NOTE ADULT - SUBJECTIVE AND OBJECTIVE BOX
YAMIL OLEA     Chief Complaint: Patient is a 68y old  Male who presents with a chief complaint of sepsis (16 Oct 2018 10:25)      PAST MEDICAL & SURGICAL HISTORY:  Anemia, unspecified type  Hyperlipidemia, unspecified hyperlipidemia type  Diabetes mellitus of other type with complication  Hypertension, unspecified type  No significant past surgical history      HPI/OVERNIGHT EVENTS: Patient is awake and alert. I spoke to his niece Florence who consent to hip biopsy on Thursday.    MEDICATIONS  (STANDING):  aspirin 325 milliGRAM(s) Oral daily  atorvastatin 20 milliGRAM(s) Oral at bedtime  dextrose 5%. 1000 milliLiter(s) (50 mL/Hr) IV Continuous <Continuous>  dextrose 50% Injectable 12.5 Gram(s) IV Push once  dextrose 50% Injectable 25 Gram(s) IV Push once  dextrose 50% Injectable 25 Gram(s) IV Push once  folic acid 1 milliGRAM(s) Oral daily  influenza   Vaccine 0.5 milliLiter(s) IntraMuscular once  insulin lispro (HumaLOG) corrective regimen sliding scale   SubCutaneous three times a day before meals  insulin lispro (HumaLOG) corrective regimen sliding scale   SubCutaneous at bedtime  levETIRAcetam  IVPB 1000 milliGRAM(s) IV Intermittent every 12 hours  melatonin 3 milliGRAM(s) Oral at bedtime  NIFEdipine XL 30 milliGRAM(s) Oral daily  pantoprazole    Tablet 40 milliGRAM(s) Oral before breakfast  simethicone 80 milliGRAM(s) Chew four times a day      Vital Signs Last 24 Hrs  T(C): 36.4 (16 Oct 2018 04:08), Max: 37.9 (15 Oct 2018 13:27)  T(F): 97.6 (16 Oct 2018 04:08), Max: 100.2 (15 Oct 2018 13:27)  HR: 77 (16 Oct 2018 04:08) (77 - 114)  BP: 121/71 (16 Oct 2018 04:08) (121/71 - 151/99)  BP(mean): --  RR: 18 (16 Oct 2018 04:08) (18 - 22)  SpO2: --    PHYSICAL EXAM:  Constitutional:  Awake alert, mentally challenged  HEENT: PERRLA, EOMI, Normal Hearing, MMM  Neck: No LAD, No JVD  Back: Normal spine flexure, No CVA tenderness  Respiratory: CTAB Cardiovascular: S1 and S2, RRR, no M/G/R  Gastrointestinal: BS+, soft, NT/ND  Extremities: No peripheral edema  Vascular: 2+ peripheral pulses  Neurological: A/O x 3, Mentally challenged    CAPILLARY BLOOD GLUCOSE    LABS:                        11.4   7.8   )-----------( 311      ( 15 Oct 2018 08:33 )             36.7     10-15    142  |  102  |  12.0  ----------------------------<  113  3.6   |  28.0  |  0.77    Ca    9.7      15 Oct 2018 08:33  Mg     1.7     10-15        Urinalysis Basic - ( 15 Oct 2018 18:34 )    Color: Yellow / Appearance: Clear / S.015 / pH: x  Gluc: x / Ketone: Negative  / Bili: Negative / Urobili: 1 mg/dL   Blood: x / Protein: 500 mg/dL / Nitrite: Negative   Leuk Esterase: Trace / RBC: 0-2 /HPF / WBC 6-10   Sq Epi: x / Non Sq Epi: Occasional / Bacteria: Occasional        RADIOLOGY & ADDITIONAL TESTS:

## 2018-10-16 NOTE — PROGRESS NOTE ADULT - SUBJECTIVE AND OBJECTIVE BOX
Patient for pelvic biopsy    Patient is not consentable and the family is not reachable, unable to obtain consent for procedure

## 2018-10-16 NOTE — PROGRESS NOTE ADULT - ASSESSMENT
1) Toxic metabolic encephalopathy --> in setting of MR  --> asa and statin   --> resolved, back at baseline   --> unable to obtain MRI due to inability to follow commands,   --> modified diet     2) possible Seziures--> secondary to cva  --> eeg shows abnormal wave patterns  --> c.w keppra     3) Lytic bone lesion of hip- Hematology following,  --> IR BIOPSY  now scheduled for Thursday 10/18  Reina Henriquez has given Dr Lewis Verbal Consent    4) Agitation /Anxiety-   --> c/w haldol /Ativan prn. Psych note reviewed.     5) Tibial Plateau fracture- C/w Brace. Ortho signed off, recommended outpatient f/u. NWB left LE, brace at all times. Needs home PT or PRAFUL    6) HLD- Continue statin Lipitor.    7) DM-II- Monitor FS, LSS.      8) HTN- C/w Nifedipine.    9)DVT-P- hold heparin sub q  please reinstate tomorrow after biopsy     dispo --> dc to [facility] once biopsy is done 1) Toxic metabolic encephalopathy --> in setting of MR  --> asa and statin   --> resolved, back at baseline   --> unable to obtain MRI due to inability to follow commands,   --> modified diet     2) possible Seziures--> secondary to cva  --> eeg shows abnormal wave patterns  --> c.w keppra     3) Lytic bone lesion of hip- Hematology following,  --> IR BIOPSY  now scheduled for Thursday 10/18  NPO 10/17 after midnight  Reina Henriquez has given Dr Lewis Verbal Consent    4) Agitation /Anxiety-   --> c/w haldol /Ativan prn. Psych note reviewed.     5) Tibial Plateau fracture- C/w Brace. Ortho signed off, recommended outpatient f/u. NWB left LE, brace at all times. Needs home PT or PRAFUL    6) HLD- Continue statin Lipitor.    7) DM-II- Monitor FS, LSS.      8) HTN- C/w Nifedipine.    9)DVT-P- hold heparin sub q  please reinstate tomorrow after biopsy     dispo --> dc to [facility] once biopsy is done

## 2018-10-16 NOTE — PROGRESS NOTE ADULT - REASON FOR ADMISSION
sepsis

## 2018-10-17 VITALS
RESPIRATION RATE: 19 BRPM | HEART RATE: 99 BPM | SYSTOLIC BLOOD PRESSURE: 128 MMHG | TEMPERATURE: 98 F | OXYGEN SATURATION: 96 % | DIASTOLIC BLOOD PRESSURE: 78 MMHG

## 2018-10-17 LAB
ANION GAP SERPL CALC-SCNC: 14 MMOL/L — SIGNIFICANT CHANGE UP (ref 5–17)
BUN SERPL-MCNC: 14 MG/DL — SIGNIFICANT CHANGE UP (ref 8–20)
CALCIUM SERPL-MCNC: 9.7 MG/DL — SIGNIFICANT CHANGE UP (ref 8.6–10.2)
CHLORIDE SERPL-SCNC: 103 MMOL/L — SIGNIFICANT CHANGE UP (ref 98–107)
CO2 SERPL-SCNC: 27 MMOL/L — SIGNIFICANT CHANGE UP (ref 22–29)
CREAT SERPL-MCNC: 0.92 MG/DL — SIGNIFICANT CHANGE UP (ref 0.5–1.3)
GLUCOSE BLDC GLUCOMTR-MCNC: 100 MG/DL — HIGH (ref 70–99)
GLUCOSE BLDC GLUCOMTR-MCNC: 101 MG/DL — HIGH (ref 70–99)
GLUCOSE BLDC GLUCOMTR-MCNC: 134 MG/DL — HIGH (ref 70–99)
GLUCOSE SERPL-MCNC: 113 MG/DL — SIGNIFICANT CHANGE UP (ref 70–115)
HCT VFR BLD CALC: 38.6 % — LOW (ref 42–52)
HGB BLD-MCNC: 12.2 G/DL — LOW (ref 14–18)
MAGNESIUM SERPL-MCNC: 1.8 MG/DL — SIGNIFICANT CHANGE UP (ref 1.6–2.6)
MCHC RBC-ENTMCNC: 28.2 PG — SIGNIFICANT CHANGE UP (ref 27–31)
MCHC RBC-ENTMCNC: 31.6 G/DL — LOW (ref 32–36)
MCV RBC AUTO: 89.4 FL — SIGNIFICANT CHANGE UP (ref 80–94)
PHOSPHATE SERPL-MCNC: 2.9 MG/DL — SIGNIFICANT CHANGE UP (ref 2.4–4.7)
PLATELET # BLD AUTO: 305 K/UL — SIGNIFICANT CHANGE UP (ref 150–400)
POTASSIUM SERPL-MCNC: 4.2 MMOL/L — SIGNIFICANT CHANGE UP (ref 3.5–5.3)
POTASSIUM SERPL-SCNC: 4.2 MMOL/L — SIGNIFICANT CHANGE UP (ref 3.5–5.3)
RBC # BLD: 4.32 M/UL — LOW (ref 4.6–6.2)
RBC # FLD: 15.9 % — HIGH (ref 11–15.6)
SODIUM SERPL-SCNC: 144 MMOL/L — SIGNIFICANT CHANGE UP (ref 135–145)
WBC # BLD: 9.5 K/UL — SIGNIFICANT CHANGE UP (ref 4.8–10.8)
WBC # FLD AUTO: 9.5 K/UL — SIGNIFICANT CHANGE UP (ref 4.8–10.8)

## 2018-10-17 PROCEDURE — 99239 HOSP IP/OBS DSCHRG MGMT >30: CPT

## 2018-10-17 RX ORDER — RISPERIDONE 4 MG/1
0.25 TABLET ORAL
Qty: 0 | Refills: 0 | Status: DISCONTINUED | OUTPATIENT
Start: 2018-10-17 | End: 2018-10-17

## 2018-10-17 RX ORDER — RISPERIDONE 4 MG/1
1 TABLET ORAL
Qty: 0 | Refills: 0 | COMMUNITY
Start: 2018-10-17

## 2018-10-17 RX ORDER — ALPRAZOLAM 0.25 MG
1 TABLET ORAL
Qty: 60 | Refills: 0 | OUTPATIENT
Start: 2018-10-17 | End: 2018-11-15

## 2018-10-17 RX ADMIN — Medication 30 MILLIGRAM(S): at 06:27

## 2018-10-17 RX ADMIN — RISPERIDONE 0.25 MILLIGRAM(S): 4 TABLET ORAL at 16:30

## 2018-10-17 RX ADMIN — Medication 1 MILLIGRAM(S): at 02:41

## 2018-10-17 RX ADMIN — HALOPERIDOL DECANOATE 2 MILLIGRAM(S): 100 INJECTION INTRAMUSCULAR at 00:30

## 2018-10-17 RX ADMIN — LEVETIRACETAM 400 MILLIGRAM(S): 250 TABLET, FILM COATED ORAL at 17:15

## 2018-10-17 RX ADMIN — PANTOPRAZOLE SODIUM 40 MILLIGRAM(S): 20 TABLET, DELAYED RELEASE ORAL at 06:27

## 2018-10-17 RX ADMIN — HALOPERIDOL DECANOATE 2 MILLIGRAM(S): 100 INJECTION INTRAMUSCULAR at 08:45

## 2018-10-17 RX ADMIN — SIMETHICONE 80 MILLIGRAM(S): 80 TABLET, CHEWABLE ORAL at 06:27

## 2018-10-17 RX ADMIN — Medication 1 MILLIGRAM(S): at 10:42

## 2018-10-17 RX ADMIN — LEVETIRACETAM 400 MILLIGRAM(S): 250 TABLET, FILM COATED ORAL at 06:26

## 2018-10-17 NOTE — CHART NOTE - NSCHARTNOTEFT_GEN_A_CORE
Pt h/o MR. agitated at times. haldol and ativan used for agitation. Haldol given 2 hrs ago Pt still spitting and kicking.   VSS NAD breathing easy and unlabored  Ativan 1 mg IVP ordered with good effect

## 2018-10-19 ENCOUNTER — INPATIENT (INPATIENT)
Facility: HOSPITAL | Age: 68
LOS: 5 days | Discharge: HOME | DRG: 684 | End: 2018-10-25
Attending: HOSPITALIST | Admitting: HOSPITALIST
Payer: MEDICARE

## 2018-10-19 VITALS
SYSTOLIC BLOOD PRESSURE: 121 MMHG | RESPIRATION RATE: 18 BRPM | HEIGHT: 70 IN | DIASTOLIC BLOOD PRESSURE: 71 MMHG | OXYGEN SATURATION: 96 % | WEIGHT: 225.09 LBS | TEMPERATURE: 98 F | HEART RATE: 101 BPM

## 2018-10-19 PROCEDURE — 99285 EMERGENCY DEPT VISIT HI MDM: CPT

## 2018-10-19 RX ORDER — SODIUM CHLORIDE 9 MG/ML
1000 INJECTION INTRAMUSCULAR; INTRAVENOUS; SUBCUTANEOUS
Qty: 0 | Refills: 0 | Status: DISCONTINUED | OUTPATIENT
Start: 2018-10-19 | End: 2018-10-20

## 2018-10-19 RX ORDER — SODIUM CHLORIDE 9 MG/ML
1000 INJECTION INTRAMUSCULAR; INTRAVENOUS; SUBCUTANEOUS ONCE
Qty: 0 | Refills: 0 | Status: COMPLETED | OUTPATIENT
Start: 2018-10-19 | End: 2018-10-19

## 2018-10-19 NOTE — ED ADULT NURSE NOTE - NSIMPLEMENTINTERV_GEN_ALL_ED
Implemented All Fall with Harm Risk Interventions:  [place] to call system. Call bell, personal items and telephone within reach. Instruct patient to call for assistance. Room bathroom lighting operational. Non-slip footwear when patient is off stretcher. Physically safe environment: no spills, clutter or unnecessary equipment. Stretcher in lowest position, wheels locked, appropriate side rails in place. Provide visual cue, wrist band, yellow gown, etc. Monitor gait and stability. Monitor for mental status changes and reorient to person, place, and time. Review medications for side effects contributing to fall risk. Reinforce activity limits and safety measures with patient and family. Provide visual clues: red socks.

## 2018-10-19 NOTE — ED PROVIDER NOTE - MEDICAL DECISION MAKING DETAILS
After discussion with staff at [facility] will repeat labs, obtain renal ultrasound, straight catheter for urine, and re-evaluate after results.

## 2018-10-19 NOTE — ED ADULT NURSE NOTE - OBJECTIVE STATEMENT
Pt awake and alert, Pap[place]inean speaking but not answering question, sent from facility for pulling out ronquillo and abn labs at this time. Pt resting comfortably, VSS, no signs of distress at this time, safety maintained, call bell in reach.

## 2018-10-19 NOTE — ED ADULT NURSE NOTE - CHIEF COMPLAINT QUOTE
pt awake and alert, BIBA from [facility] for pulling out Ramirez cathter.

## 2018-10-19 NOTE — ED PROVIDER NOTE - OBJECTIVE STATEMENT
69 y/o M pt BIBA with hx of anemia, DM, HLD, and HTN presents to ED from [facility] for placement of Ramirez catheter as per paperwork from nursing home. Spoke with [facility] and was informed that the MD at the facility wanted cathter placed due to creatine of 2.9, which was normal 2 days ago.  Attempted to use  to interview pt but pt was not able to give any history, HPI limited due to pt's condition.   : Pia Contreras

## 2018-10-19 NOTE — ED ADULT TRIAGE NOTE - CHIEF COMPLAINT QUOTE
pt awake and alert, BIBA from [facility] for pulling out Ramirez cathter.

## 2018-10-20 DIAGNOSIS — E13.8 OTHER SPECIFIED DIABETES MELLITUS WITH UNSPECIFIED COMPLICATIONS: ICD-10-CM

## 2018-10-20 DIAGNOSIS — N17.9 ACUTE KIDNEY FAILURE, UNSPECIFIED: ICD-10-CM

## 2018-10-20 DIAGNOSIS — I10 ESSENTIAL (PRIMARY) HYPERTENSION: ICD-10-CM

## 2018-10-20 DIAGNOSIS — E87.6 HYPOKALEMIA: ICD-10-CM

## 2018-10-20 DIAGNOSIS — D64.9 ANEMIA, UNSPECIFIED: ICD-10-CM

## 2018-10-20 LAB
ALBUMIN SERPL ELPH-MCNC: 3.2 G/DL — LOW (ref 3.3–5.2)
ALP SERPL-CCNC: 80 U/L — SIGNIFICANT CHANGE UP (ref 40–120)
ALT FLD-CCNC: 12 U/L — SIGNIFICANT CHANGE UP
ANION GAP SERPL CALC-SCNC: 16 MMOL/L — SIGNIFICANT CHANGE UP (ref 5–17)
ANION GAP SERPL CALC-SCNC: 16 MMOL/L — SIGNIFICANT CHANGE UP (ref 5–17)
APPEARANCE UR: ABNORMAL
AST SERPL-CCNC: 15 U/L — SIGNIFICANT CHANGE UP
BASOPHILS # BLD AUTO: 0 K/UL — SIGNIFICANT CHANGE UP (ref 0–0.2)
BASOPHILS NFR BLD AUTO: 0.1 % — SIGNIFICANT CHANGE UP (ref 0–2)
BILIRUB SERPL-MCNC: 0.5 MG/DL — SIGNIFICANT CHANGE UP (ref 0.4–2)
BILIRUB UR-MCNC: NEGATIVE — SIGNIFICANT CHANGE UP
BUN SERPL-MCNC: 33 MG/DL — HIGH (ref 8–20)
BUN SERPL-MCNC: 37 MG/DL — HIGH (ref 8–20)
CALCIUM SERPL-MCNC: 8.8 MG/DL — SIGNIFICANT CHANGE UP (ref 8.6–10.2)
CALCIUM SERPL-MCNC: 9.6 MG/DL — SIGNIFICANT CHANGE UP (ref 8.6–10.2)
CHLORIDE SERPL-SCNC: 102 MMOL/L — SIGNIFICANT CHANGE UP (ref 98–107)
CHLORIDE SERPL-SCNC: 96 MMOL/L — LOW (ref 98–107)
CHLORIDE UR-SCNC: 51 MMOL/L — SIGNIFICANT CHANGE UP
CO2 SERPL-SCNC: 23 MMOL/L — SIGNIFICANT CHANGE UP (ref 22–29)
CO2 SERPL-SCNC: 26 MMOL/L — SIGNIFICANT CHANGE UP (ref 22–29)
COLOR SPEC: YELLOW — SIGNIFICANT CHANGE UP
COMMENT - URINE: SIGNIFICANT CHANGE UP
CREAT ?TM UR-MCNC: 100 MG/DL — SIGNIFICANT CHANGE UP
CREAT SERPL-MCNC: 1.64 MG/DL — HIGH (ref 0.5–1.3)
CREAT SERPL-MCNC: 2.99 MG/DL — HIGH (ref 0.5–1.3)
DIFF PNL FLD: ABNORMAL
EOSINOPHIL # BLD AUTO: 0 K/UL — SIGNIFICANT CHANGE UP (ref 0–0.5)
EOSINOPHIL NFR BLD AUTO: 0.1 % — SIGNIFICANT CHANGE UP (ref 0–5)
EPI CELLS # UR: SIGNIFICANT CHANGE UP
GLUCOSE BLDC GLUCOMTR-MCNC: 114 MG/DL — HIGH (ref 70–99)
GLUCOSE BLDC GLUCOMTR-MCNC: 138 MG/DL — HIGH (ref 70–99)
GLUCOSE BLDC GLUCOMTR-MCNC: 90 MG/DL — SIGNIFICANT CHANGE UP (ref 70–99)
GLUCOSE BLDC GLUCOMTR-MCNC: 92 MG/DL — SIGNIFICANT CHANGE UP (ref 70–99)
GLUCOSE SERPL-MCNC: 113 MG/DL — SIGNIFICANT CHANGE UP (ref 70–115)
GLUCOSE SERPL-MCNC: 85 MG/DL — SIGNIFICANT CHANGE UP (ref 70–115)
GLUCOSE UR QL: NEGATIVE MG/DL — SIGNIFICANT CHANGE UP
HCT VFR BLD CALC: 35.8 % — LOW (ref 42–52)
HGB BLD-MCNC: 11.1 G/DL — LOW (ref 14–18)
KETONES UR-MCNC: ABNORMAL
LEUKOCYTE ESTERASE UR-ACNC: ABNORMAL
LYMPHOCYTES # BLD AUTO: 1 K/UL — SIGNIFICANT CHANGE UP (ref 1–4.8)
LYMPHOCYTES # BLD AUTO: 8.9 % — LOW (ref 20–55)
MCHC RBC-ENTMCNC: 27.9 PG — SIGNIFICANT CHANGE UP (ref 27–31)
MCHC RBC-ENTMCNC: 31 G/DL — LOW (ref 32–36)
MCV RBC AUTO: 89.9 FL — SIGNIFICANT CHANGE UP (ref 80–94)
MONOCYTES # BLD AUTO: 0.6 K/UL — SIGNIFICANT CHANGE UP (ref 0–0.8)
MONOCYTES NFR BLD AUTO: 6 % — SIGNIFICANT CHANGE UP (ref 3–10)
NEUTROPHILS # BLD AUTO: 9.2 K/UL — HIGH (ref 1.8–8)
NEUTROPHILS NFR BLD AUTO: 84.6 % — HIGH (ref 37–73)
NITRITE UR-MCNC: NEGATIVE — SIGNIFICANT CHANGE UP
OSMOLALITY UR: 377 MOSM/KG — SIGNIFICANT CHANGE UP (ref 300–1000)
PH UR: 5 — SIGNIFICANT CHANGE UP (ref 5–8)
PLATELET # BLD AUTO: 318 K/UL — SIGNIFICANT CHANGE UP (ref 150–400)
POTASSIUM SERPL-MCNC: 3.3 MMOL/L — LOW (ref 3.5–5.3)
POTASSIUM SERPL-MCNC: 4 MMOL/L — SIGNIFICANT CHANGE UP (ref 3.5–5.3)
POTASSIUM SERPL-SCNC: 3.3 MMOL/L — LOW (ref 3.5–5.3)
POTASSIUM SERPL-SCNC: 4 MMOL/L — SIGNIFICANT CHANGE UP (ref 3.5–5.3)
POTASSIUM UR-SCNC: 23 MMOL/L — SIGNIFICANT CHANGE UP
PROT SERPL-MCNC: 7.6 G/DL — SIGNIFICANT CHANGE UP (ref 6.6–8.7)
PROT UR-MCNC: 100 MG/DL
RBC # BLD: 3.98 M/UL — LOW (ref 4.6–6.2)
RBC # FLD: 16.2 % — HIGH (ref 11–15.6)
RBC CASTS # UR COMP ASSIST: SIGNIFICANT CHANGE UP /HPF (ref 0–4)
SODIUM SERPL-SCNC: 138 MMOL/L — SIGNIFICANT CHANGE UP (ref 135–145)
SODIUM SERPL-SCNC: 141 MMOL/L — SIGNIFICANT CHANGE UP (ref 135–145)
SODIUM UR-SCNC: 66 MMOL/L — SIGNIFICANT CHANGE UP
SP GR SPEC: 1.02 — SIGNIFICANT CHANGE UP (ref 1.01–1.02)
UROBILINOGEN FLD QL: 1 MG/DL
WBC # BLD: 10.9 K/UL — HIGH (ref 4.8–10.8)
WBC # FLD AUTO: 10.9 K/UL — HIGH (ref 4.8–10.8)
WBC UR QL: SIGNIFICANT CHANGE UP

## 2018-10-20 PROCEDURE — 76775 US EXAM ABDO BACK WALL LIM: CPT | Mod: 26

## 2018-10-20 PROCEDURE — 12345: CPT | Mod: NC

## 2018-10-20 PROCEDURE — 99222 1ST HOSP IP/OBS MODERATE 55: CPT

## 2018-10-20 PROCEDURE — 99223 1ST HOSP IP/OBS HIGH 75: CPT

## 2018-10-20 RX ORDER — PANTOPRAZOLE SODIUM 20 MG/1
40 TABLET, DELAYED RELEASE ORAL
Qty: 0 | Refills: 0 | Status: DISCONTINUED | OUTPATIENT
Start: 2018-10-20 | End: 2018-10-25

## 2018-10-20 RX ORDER — ASPIRIN/CALCIUM CARB/MAGNESIUM 324 MG
325 TABLET ORAL DAILY
Qty: 0 | Refills: 0 | Status: DISCONTINUED | OUTPATIENT
Start: 2018-10-20 | End: 2018-10-25

## 2018-10-20 RX ORDER — FOLIC ACID 0.8 MG
1 TABLET ORAL DAILY
Qty: 0 | Refills: 0 | Status: DISCONTINUED | OUTPATIENT
Start: 2018-10-20 | End: 2018-10-25

## 2018-10-20 RX ORDER — ATORVASTATIN CALCIUM 80 MG/1
80 TABLET, FILM COATED ORAL AT BEDTIME
Qty: 0 | Refills: 0 | Status: DISCONTINUED | OUTPATIENT
Start: 2018-10-20 | End: 2018-10-25

## 2018-10-20 RX ORDER — DEXTROSE 50 % IN WATER 50 %
25 SYRINGE (ML) INTRAVENOUS ONCE
Qty: 0 | Refills: 0 | Status: DISCONTINUED | OUTPATIENT
Start: 2018-10-20 | End: 2018-10-25

## 2018-10-20 RX ORDER — HEPARIN SODIUM 5000 [USP'U]/ML
5000 INJECTION INTRAVENOUS; SUBCUTANEOUS EVERY 8 HOURS
Qty: 0 | Refills: 0 | Status: COMPLETED | OUTPATIENT
Start: 2018-10-20 | End: 2018-10-21

## 2018-10-20 RX ORDER — RISPERIDONE 4 MG/1
0.25 TABLET ORAL
Qty: 0 | Refills: 0 | Status: DISCONTINUED | OUTPATIENT
Start: 2018-10-20 | End: 2018-10-25

## 2018-10-20 RX ORDER — ALPRAZOLAM 0.25 MG
0.25 TABLET ORAL
Qty: 0 | Refills: 0 | Status: DISCONTINUED | OUTPATIENT
Start: 2018-10-20 | End: 2018-10-25

## 2018-10-20 RX ORDER — INSULIN LISPRO 100/ML
VIAL (ML) SUBCUTANEOUS
Qty: 0 | Refills: 0 | Status: DISCONTINUED | OUTPATIENT
Start: 2018-10-20 | End: 2018-10-25

## 2018-10-20 RX ORDER — NIFEDIPINE 30 MG
30 TABLET, EXTENDED RELEASE 24 HR ORAL DAILY
Qty: 0 | Refills: 0 | Status: DISCONTINUED | OUTPATIENT
Start: 2018-10-20 | End: 2018-10-25

## 2018-10-20 RX ORDER — SODIUM CHLORIDE 9 MG/ML
1000 INJECTION, SOLUTION INTRAVENOUS
Qty: 0 | Refills: 0 | Status: DISCONTINUED | OUTPATIENT
Start: 2018-10-20 | End: 2018-10-25

## 2018-10-20 RX ORDER — INSULIN LISPRO 100/ML
VIAL (ML) SUBCUTANEOUS AT BEDTIME
Qty: 0 | Refills: 0 | Status: DISCONTINUED | OUTPATIENT
Start: 2018-10-20 | End: 2018-10-25

## 2018-10-20 RX ORDER — SODIUM CHLORIDE 9 MG/ML
1000 INJECTION INTRAMUSCULAR; INTRAVENOUS; SUBCUTANEOUS
Qty: 0 | Refills: 0 | Status: DISCONTINUED | OUTPATIENT
Start: 2018-10-20 | End: 2018-10-20

## 2018-10-20 RX ORDER — TAMSULOSIN HYDROCHLORIDE 0.4 MG/1
0.4 CAPSULE ORAL AT BEDTIME
Qty: 0 | Refills: 0 | Status: DISCONTINUED | OUTPATIENT
Start: 2018-10-20 | End: 2018-10-25

## 2018-10-20 RX ORDER — SODIUM CHLORIDE 9 MG/ML
1000 INJECTION INTRAMUSCULAR; INTRAVENOUS; SUBCUTANEOUS
Qty: 0 | Refills: 0 | Status: DISCONTINUED | OUTPATIENT
Start: 2018-10-20 | End: 2018-10-22

## 2018-10-20 RX ORDER — LEVETIRACETAM 250 MG/1
1000 TABLET, FILM COATED ORAL
Qty: 0 | Refills: 0 | Status: DISCONTINUED | OUTPATIENT
Start: 2018-10-20 | End: 2018-10-25

## 2018-10-20 RX ORDER — GLUCAGON INJECTION, SOLUTION 0.5 MG/.1ML
1 INJECTION, SOLUTION SUBCUTANEOUS ONCE
Qty: 0 | Refills: 0 | Status: DISCONTINUED | OUTPATIENT
Start: 2018-10-20 | End: 2018-10-25

## 2018-10-20 RX ORDER — DEXTROSE 50 % IN WATER 50 %
12.5 SYRINGE (ML) INTRAVENOUS ONCE
Qty: 0 | Refills: 0 | Status: DISCONTINUED | OUTPATIENT
Start: 2018-10-20 | End: 2018-10-25

## 2018-10-20 RX ORDER — DEXTROSE 50 % IN WATER 50 %
15 SYRINGE (ML) INTRAVENOUS ONCE
Qty: 0 | Refills: 0 | Status: DISCONTINUED | OUTPATIENT
Start: 2018-10-20 | End: 2018-10-25

## 2018-10-20 RX ORDER — LANOLIN ALCOHOL/MO/W.PET/CERES
3 CREAM (GRAM) TOPICAL AT BEDTIME
Qty: 0 | Refills: 0 | Status: DISCONTINUED | OUTPATIENT
Start: 2018-10-20 | End: 2018-10-25

## 2018-10-20 RX ORDER — SIMETHICONE 80 MG/1
80 TABLET, CHEWABLE ORAL
Qty: 0 | Refills: 0 | Status: DISCONTINUED | OUTPATIENT
Start: 2018-10-20 | End: 2018-10-25

## 2018-10-20 RX ADMIN — SODIUM CHLORIDE 85 MILLILITER(S): 9 INJECTION INTRAMUSCULAR; INTRAVENOUS; SUBCUTANEOUS at 22:24

## 2018-10-20 RX ADMIN — Medication 325 MILLIGRAM(S): at 18:18

## 2018-10-20 RX ADMIN — LEVETIRACETAM 1000 MILLIGRAM(S): 250 TABLET, FILM COATED ORAL at 05:10

## 2018-10-20 RX ADMIN — HEPARIN SODIUM 5000 UNIT(S): 5000 INJECTION INTRAVENOUS; SUBCUTANEOUS at 17:19

## 2018-10-20 RX ADMIN — SODIUM CHLORIDE 85 MILLILITER(S): 9 INJECTION INTRAMUSCULAR; INTRAVENOUS; SUBCUTANEOUS at 10:20

## 2018-10-20 RX ADMIN — RISPERIDONE 0.25 MILLIGRAM(S): 4 TABLET ORAL at 05:10

## 2018-10-20 RX ADMIN — Medication 30 MILLIGRAM(S): at 05:10

## 2018-10-20 RX ADMIN — RISPERIDONE 0.25 MILLIGRAM(S): 4 TABLET ORAL at 18:20

## 2018-10-20 RX ADMIN — SODIUM CHLORIDE 125 MILLILITER(S): 9 INJECTION INTRAMUSCULAR; INTRAVENOUS; SUBCUTANEOUS at 03:19

## 2018-10-20 RX ADMIN — LEVETIRACETAM 1000 MILLIGRAM(S): 250 TABLET, FILM COATED ORAL at 18:20

## 2018-10-20 RX ADMIN — HEPARIN SODIUM 5000 UNIT(S): 5000 INJECTION INTRAVENOUS; SUBCUTANEOUS at 22:26

## 2018-10-20 RX ADMIN — TAMSULOSIN HYDROCHLORIDE 0.4 MILLIGRAM(S): 0.4 CAPSULE ORAL at 22:25

## 2018-10-20 RX ADMIN — Medication 3 MILLIGRAM(S): at 22:25

## 2018-10-20 RX ADMIN — SIMETHICONE 80 MILLIGRAM(S): 80 TABLET, CHEWABLE ORAL at 18:20

## 2018-10-20 RX ADMIN — SODIUM CHLORIDE 2000 MILLILITER(S): 9 INJECTION INTRAMUSCULAR; INTRAVENOUS; SUBCUTANEOUS at 00:10

## 2018-10-20 RX ADMIN — HEPARIN SODIUM 5000 UNIT(S): 5000 INJECTION INTRAVENOUS; SUBCUTANEOUS at 05:13

## 2018-10-20 RX ADMIN — ATORVASTATIN CALCIUM 80 MILLIGRAM(S): 80 TABLET, FILM COATED ORAL at 22:25

## 2018-10-20 RX ADMIN — SIMETHICONE 80 MILLIGRAM(S): 80 TABLET, CHEWABLE ORAL at 22:25

## 2018-10-20 RX ADMIN — SIMETHICONE 80 MILLIGRAM(S): 80 TABLET, CHEWABLE ORAL at 18:18

## 2018-10-20 RX ADMIN — SIMETHICONE 80 MILLIGRAM(S): 80 TABLET, CHEWABLE ORAL at 05:10

## 2018-10-20 RX ADMIN — PANTOPRAZOLE SODIUM 40 MILLIGRAM(S): 20 TABLET, DELAYED RELEASE ORAL at 07:48

## 2018-10-20 RX ADMIN — Medication 1 MILLIGRAM(S): at 18:19

## 2018-10-20 NOTE — H&P ADULT - NSHPLABSRESULTS_GEN_ALL_CORE
11.1   10.9  )-----------( 318      ( 20 Oct 2018 00:31 )             35.8       10-20    138  |  96<L>  |  37.0<H>  ----------------------------<  113  4.0   |  26.0  |  2.99<H>    Ca    9.6      20 Oct 2018 00:31    TPro  7.6  /  Alb  3.2<L>  /  TBili  0.5  /  DBili  x   /  AST  15  /  ALT  12  /  AlkPhos  80  10-20

## 2018-10-20 NOTE — H&P ADULT - ASSESSMENT
68y/oM hx mental retardation/developmental delay, HTN, DM, seizure disorder, Micronesian speaking and apparently minimally conversant at baseline sent from [facility] for Cr elevation of 2.9, admitted for work up of new acute renal failure    #Acute renal failure  -Unclear etiology at this time given such acute onset  -Ramirez in place  -Renal US ordered and pending 68y/oM hx mental retardation/developmental delay, HTN, DM, seizure disorder, Chadian speaking and apparently minimally conversant at baseline, recent discharged on 10/17 after admission for sepsis of unclear etiology with incidental finding of 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 obstruction  -Ramirez in place  -Received IVF challenge in ED, maintenance IVF x 10hr ordered  -Renal US ordered and pending  -Flomax ordered  -Urine lytes ordered  -Nephrology evaluation    #Seizure disorder- Keppra    #HTN- nifedipine    #Developmental delay with hx of behavioral disturbance/agitation  -Ordered dysphagia 1 pureed diet, which is diet pt was on at time of d/c on 10/17  -Risperidone and xanax BID PRN resumed   -Melatonin    Prophylactic measure- heparin SQ 68y/oM hx mental retardation/developmental delay, HTN, DM, seizure disorder, Yi speaking and ?minimally conversant at baseline, recently discharged on 10/17 after admission for sepsis of unclear etiology with incidental finding of 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 obstruction  -Ramirez in place  -Received IVF challenge in ED, maintenance IVF x 10hr ordered  -Renal US ordered and pending  -Flomax ordered  -Urine lytes ordered  -Nephrology evaluation    #Seizure disorder- Keppra    #HTN- nifedipine    #Developmental delay with hx of behavioral disturbance/agitation  -Ordered dysphagia 1 pureed diet, which is diet pt was on at time of d/c on 10/17  -Risperidone and xanax BID PRN resumed   -Melatonin    Prophylactic measure- heparin SQ 68y/oM hx mental retardation/developmental delay, HTN, DM, seizure disorder, Kazakh speaking and ?minimally conversant at baseline, recently discharged on 10/17 after admission for sepsis of unclear etiology with incidental finding of 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 obstruction  -Ramirez in place  -Received IVF challenge in ED, maintenance IVF x 10hr ordered  -Renal US ordered and pending  -Flomax ordered  -Urine lytes ordered  -Nephrology consulted ([facility] called)    #Seizure disorder- Keppra    #HTN/HLD- nifedipine, statin    #Developmental delay with hx of behavioral disturbance/agitation  -Ordered dysphagia 1 pureed diet, which is diet pt was on at time of d/c on 10/17  -Risperidone and xanax BID PRN resumed   -Melatonin    #DM- holding metformin, insulin sliding scale    #Prophylactic measure- heparin SQ

## 2018-10-20 NOTE — H&P ADULT - HISTORY OF PRESENT ILLNESS
68y/oM hx mental retardation/developmental delay, HTN, DM, seizure disorder, Sammarinese speaking and apparently minimally conversant at baseline sent from [facility] for Cr elevation of 2.9.  Pt unable to provide any hx to me or ED providers despite use of  as he is not answering questions, so hx obtained from chart review.  Pt was recently hospitalized at [facility] and discharged on 10/17 after admission for sepsis of unclear etiology with incidental finding of lytic bone lesion suspected ot be a plasma cell dyscrasia.   Per documents, pt was unable to biopsy due to issues with obtaining consent and was discharged to rehab 2 days ago with plan for outpatient biopsy with a normal Cr level.  Pt was sent to hospital due to Cr of 2.9 with instruction for placement of Ramirez catheter.  No documentation of pt having difficulty voiding or reporting pain.  Per ED RN, only thing pt has said during shift is when vocalizing he wants to urinate in Sammarinese, despite having Ramirez in place. 68y/oM hx mental retardation/developmental delay, HTN, DM, seizure disorder, tibial fracture with brace, Armenian speaking sent from [facility] for Cr elevation of 2.9.  Unclear if pt is minimally conversant at baseline, but pt unable to provide any hx to me or ED providers despite use of  as he is not answering questions, so hx obtained from chart review.  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   Per documents, pt was unable to biopsy due to issues with obtaining consent and was discharged to rehab 2 days ago with plan for outpatient biopsy with a normal Cr level.  Pt was sent to hospital due to Cr of 2.9 with instruction for placement of Ramirez catheter.  No documentation of pt having difficulty voiding or reporting pain.  Per ED RN, only thing pt has said during shift is when vocalizing he wants to urinate in Armenian, despite having Ramirez in place.

## 2018-10-20 NOTE — PROGRESS NOTE ADULT - SUBJECTIVE AND OBJECTIVE BOX
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 History obtained via chart; pt not speaking at this time ; labs reviewed with normal K/L ratio and neg immunofixation.   Med HPI:  68y/oM hx mental retardation/developmental delay, HTN, DM, seizure disorder, tibial fracture with brace, Vincentian speaking sent from [facility] for Cr elevation of 2.9.  Unclear if pt is minimally conversant at baseline, but pt unable to provide any hx to me or ED providers despite use of  as he is not answering questions, so hx obtained from chart review.  Pt was recently hospitalized at [facility] and discharged on 10/17 after admission for sepsis of unclear etiology with incidental finding of lytic bone lesion suspected to be a plasma cell dyscrasia.   Per documents, pt was unable to biopsy due to issues with obtaining consent and was discharged to rehab 2 days ago with plan for outpatient biopsy with a normal Cr sindye      PAST HISTORY  --------------------------------------------------------------------------------  No significant changes to PMH, PSH, FHx, SHx, unless otherwise noted    ALLERGIES & MEDICATIONS  --------------------------------------------------------------------------------  Allergies    No Known Allergies    Intolerances      Standing Inpatient Medications  aspirin enteric coated 325 milliGRAM(s) Oral daily  atorvastatin 80 milliGRAM(s) Oral at bedtime  dextrose 5%. 1000 milliLiter(s) IV Continuous <Continuous>  dextrose 50% Injectable 12.5 Gram(s) IV Push once  dextrose 50% Injectable 25 Gram(s) IV Push once  dextrose 50% Injectable 25 Gram(s) IV Push once  folic acid 1 milliGRAM(s) Oral daily  heparin  Injectable 5000 Unit(s) SubCutaneous every 8 hours  insulin lispro (HumaLOG) corrective regimen sliding scale   SubCutaneous three times a day before meals  insulin lispro (HumaLOG) corrective regimen sliding scale   SubCutaneous at bedtime  levETIRAcetam 1000 milliGRAM(s) Oral two times a day  melatonin 3 milliGRAM(s) Oral at bedtime  NIFEdipine XL 30 milliGRAM(s) Oral daily  pantoprazole    Tablet 40 milliGRAM(s) Oral before breakfast  risperiDONE   Tablet 0.25 milliGRAM(s) Oral two times a day  simethicone 80 milliGRAM(s) Chew four times a day  sodium chloride 0.9%. 1000 milliLiter(s) IV Continuous <Continuous>  tamsulosin 0.4 milliGRAM(s) Oral at bedtime    PRN Inpatient Medications  ALPRAZolam 0.25 milliGRAM(s) Oral two times a day PRN  dextrose 40% Gel 15 Gram(s) Oral once PRN  glucagon  Injectable 1 milliGRAM(s) IntraMuscular once PRN      REVIEW OF SYSTEMS  --------------------------------------------------------------------------------  Unable to obtain      VITALS/PHYSICAL EXAM  --------------------------------------------------------------------------------  T(C): 36.6 (10-20-18 @ 07:30), Max: 37.3 (10-20-18 @ 00:30)  HR: 77 (10-20-18 @ 07:30) (77 - 101)  BP: 120/62 (10-20-18 @ 07:30) (119/70 - 127/63)  RR: 18 (10-20-18 @ 07:30) (16 - 18)  SpO2: 97% (10-20-18 @ 07:30) (96% - 97%)  Wt(kg): --  Height (cm): 177.8 (10-19-18 @ 21:09)  Weight (kg): 102.1 (10-19-18 @ 21:09)  BMI (kg/m2): 32.3 (10-19-18 @ 21:09)  BSA (m2): 2.19 (10-19-18 @ 21:09)      Physical Exam:  	GENERAL:  Older middle aged male, sleeping but arousable, not in acute distress, not answering questions  	EYES:  Clear conjunctiva, pupils reactive to light  	ENT: Moist mucous membranes  	RESP:  Non-labored breathing pattern, lungs clear to ausculation in anterior fields  	CV: Regular rate and rhythm, no murmurs appreciated, bilateral lower extremity edema  	GI: Soft, non-distended, does not grimace upon deep palpation   	: Ramirez in place  	MSK: Left leg in brace  	NEURO: Alert when aroused, unable to do exam as pt not participating and following commands, able to move both upper extremities spontaneous, withdraws lower extremity and moves toes to tactile stimuli   	PSYCH: Calm, does not cooperate with full exam  SKIN: Bilateral skin discoloration of both legs    LABS/STUDIES  --------------------------------------------------------------------------------              11.1   10.9  >-----------<  318      [10-20-18 @ 00:31]              35.8     138  |  96  |  37.0  ----------------------------<  113      [10-20-18 @ 00:31]  4.0   |  26.0  |  2.99        Ca     9.6     [10-20-18 @ 00:31]    TPro  7.6  /  Alb  3.2  /  TBili  0.5  /  DBili  x   /  AST  15  /  ALT  12  /  AlkPhos  80  [10-20-18 @ 00:31]          Creatinine Trend:  SCr 2.99 [10-20 @ 00:31]  SCr 0.92 [10-17 @ 08:33]  SCr 0.77 [10-15 @ 08:33]  SCr 0.73 [10-13 @ 08:26]  SCr 1.09 [10-12 @ 07:17]    Urinalysis - [10-20-18 @ 03:37]      Color Yellow / Appearance Slightly Turbid / SG 1.025 / pH 5.0      Gluc Negative / Ketone Trace  / Bili Negative / Urobili 1       Blood Small / Protein 100 / Leuk Est Small / Nitrite Negative      RBC 0-2 / WBC 0-2 / Hyaline  / Gran  / Sq Epi  / Non Sq Epi Occasional / Bacteria     Urine Creatinine 100      [10-20-18 @ 09:03]  Urine Sodium 66      [10-20-18 @ 09:03]  Urine Potassium 23      [10-20-18 @ 09:03]  Urine Chloride 51      [10-20-18 @ 09:03]  Urine Osmolality 377      [10-20-18 @ 09:03]    Iron 38, TIBC 204, %sat 19      [10-03-18 @ 08:13]  Ferritin 115      [10-03-18 @ 08:13]  HbA1c 6.6      [09-18-18 @ 03:11]  TSH 1.45      [09-17-18 @ 01:52]  Lipid: chol 169, , HDL 33,       [10-16-18 @ 09:23]      Free Light Chains: kappa 6.52, lambda 6.96, ratio = 0.94      [10-04 @ 15:33]  Immunofixation Serum:   No Monoclonal Band Identified      [10-04-18 @ 15:33]  SPEP Interpretation: Normal Electrophoresis Pattern      [10-04-18 @ 15:33]  Immunofixation Urine: No Monoclonal Band Identified      [10-06-18 @ 19:47]  UPEP Interpretation: Moderately Selective (Glomerular) Proteinuria      [10-06-18 @ 19:47] [facility] DIVISION OF KIDNEY DISEASES AND HYPERTENSION -- INITIAL CONSULT NOTE  --------------------------------------------------------------------------------  Chief Complaint: BOUCHRA    24 hour events/subjective:  68 M with Mental Retardation, HTN, DM, seizure d/o , tibial fx, sent for BOUCHRA ; found to have lytic bone lesion with 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 History obtained via chart; pt not speaking at this time ; labs reviewed with normal K/L ratio and neg immunofixation.   Med HPI:  68y/oM hx mental retardation/developmental delay, HTN, DM, seizure disorder, tibial fracture with brace, Eritrean speaking sent from [facility] for Cr elevation of 2.9.  Unclear if pt is minimally conversant at baseline, but pt unable to provide any hx to me or ED providers despite use of  as he is not answering questions, so hx obtained from chart review.  Pt was recently hospitalized at [facility] and discharged on 10/17 after admission for sepsis of unclear etiology with incidental finding of lytic bone lesion suspected to be a plasma cell dyscrasia.   Per documents, pt was unable to biopsy due to issues with obtaining consent and was discharged to rehab 2 days ago with plan for outpatient biopsy with a normal Cr sindye      PAST HISTORY  --------------------------------------------------------------------------------  No significant changes to PMH, PSH, FHx, SHx, unless otherwise noted    ALLERGIES & MEDICATIONS  --------------------------------------------------------------------------------  Allergies    No Known Allergies    Intolerances      Standing Inpatient Medications  aspirin enteric coated 325 milliGRAM(s) Oral daily  atorvastatin 80 milliGRAM(s) Oral at bedtime  dextrose 5%. 1000 milliLiter(s) IV Continuous <Continuous>  dextrose 50% Injectable 12.5 Gram(s) IV Push once  dextrose 50% Injectable 25 Gram(s) IV Push once  dextrose 50% Injectable 25 Gram(s) IV Push once  folic acid 1 milliGRAM(s) Oral daily  heparin  Injectable 5000 Unit(s) SubCutaneous every 8 hours  insulin lispro (HumaLOG) corrective regimen sliding scale   SubCutaneous three times a day before meals  insulin lispro (HumaLOG) corrective regimen sliding scale   SubCutaneous at bedtime  levETIRAcetam 1000 milliGRAM(s) Oral two times a day  melatonin 3 milliGRAM(s) Oral at bedtime  NIFEdipine XL 30 milliGRAM(s) Oral daily  pantoprazole    Tablet 40 milliGRAM(s) Oral before breakfast  risperiDONE   Tablet 0.25 milliGRAM(s) Oral two times a day  simethicone 80 milliGRAM(s) Chew four times a day  sodium chloride 0.9%. 1000 milliLiter(s) IV Continuous <Continuous>  tamsulosin 0.4 milliGRAM(s) Oral at bedtime    PRN Inpatient Medications  ALPRAZolam 0.25 milliGRAM(s) Oral two times a day PRN  dextrose 40% Gel 15 Gram(s) Oral once PRN  glucagon  Injectable 1 milliGRAM(s) IntraMuscular once PRN      REVIEW OF SYSTEMS  --------------------------------------------------------------------------------  Unable to obtain      VITALS/PHYSICAL EXAM  --------------------------------------------------------------------------------  T(C): 36.6 (10-20-18 @ 07:30), Max: 37.3 (10-20-18 @ 00:30)  HR: 77 (10-20-18 @ 07:30) (77 - 101)  BP: 120/62 (10-20-18 @ 07:30) (119/70 - 127/63)  RR: 18 (10-20-18 @ 07:30) (16 - 18)  SpO2: 97% (10-20-18 @ 07:30) (96% - 97%)  Wt(kg): --  Height (cm): 177.8 (10-19-18 @ 21:09)  Weight (kg): 102.1 (10-19-18 @ 21:09)  BMI (kg/m2): 32.3 (10-19-18 @ 21:09)  BSA (m2): 2.19 (10-19-18 @ 21:09)      Physical Exam:  	GENERAL:  Older middle aged male, sleeping but arousable, not in acute distress, not answering questions  	EYES:  Clear conjunctiva, pupils reactive to light  	ENT: Moist mucous membranes  	RESP:  Non-labored breathing pattern, lungs clear to ausculation in anterior fields  	CV: Regular rate and rhythm, no murmurs appreciated, bilateral lower extremity edema  	GI: Soft, non-distended, does not grimace upon deep palpation   	: Ramirez in place  	MSK: Left leg in brace  	NEURO: Alert when aroused, unable to do exam as pt not participating and following commands, able to move both upper extremities spontaneous, withdraws lower extremity and moves toes to tactile stimuli   	PSYCH: Calm, does not cooperate with full exam  SKIN: Bilateral skin discoloration of both legs    LABS/STUDIES  --------------------------------------------------------------------------------              11.1   10.9  >-----------<  318      [10-20-18 @ 00:31]              35.8     138  |  96  |  37.0  ----------------------------<  113      [10-20-18 @ 00:31]  4.0   |  26.0  |  2.99        Ca     9.6     [10-20-18 @ 00:31]    TPro  7.6  /  Alb  3.2  /  TBili  0.5  /  DBili  x   /  AST  15  /  ALT  12  /  AlkPhos  80  [10-20-18 @ 00:31]          Creatinine Trend:  SCr 2.99 [10-20 @ 00:31]  SCr 0.92 [10-17 @ 08:33]  SCr 0.77 [10-15 @ 08:33]  SCr 0.73 [10-13 @ 08:26]  SCr 1.09 [10-12 @ 07:17]    Urinalysis - [10-20-18 @ 03:37]      Color Yellow / Appearance Slightly Turbid / SG 1.025 / pH 5.0      Gluc Negative / Ketone Trace  / Bili Negative / Urobili 1       Blood Small / Protein 100 / Leuk Est Small / Nitrite Negative      RBC 0-2 / WBC 0-2 / Hyaline  / Gran  / Sq Epi  / Non Sq Epi Occasional / Bacteria     Urine Creatinine 100      [10-20-18 @ 09:03]  Urine Sodium 66      [10-20-18 @ 09:03]  Urine Potassium 23      [10-20-18 @ 09:03]  Urine Chloride 51      [10-20-18 @ 09:03]  Urine Osmolality 377      [10-20-18 @ 09:03]    Iron 38, TIBC 204, %sat 19      [10-03-18 @ 08:13]  Ferritin 115      [10-03-18 @ 08:13]  HbA1c 6.6      [09-18-18 @ 03:11]  TSH 1.45      [09-17-18 @ 01:52]  Lipid: chol 169, , HDL 33,       [10-16-18 @ 09:23]      Free Light Chains: kappa 6.52, lambda 6.96, ratio = 0.94      [10-04 @ 15:33]  Immunofixation Serum:   No Monoclonal Band Identified      [10-04-18 @ 15:33]  SPEP Interpretation: Normal Electrophoresis Pattern      [10-04-18 @ 15:33]  Immunofixation Urine: No Monoclonal Band Identified      [10-06-18 @ 19:47]  UPEP Interpretation: Moderately Selective (Glomerular) Proteinuria      [10-06-18 @ 19:47]

## 2018-10-20 NOTE — H&P ADULT - NSHPPHYSICALEXAM_GEN_ALL_CORE
Vital Signs Last 24 Hrs  T(C): 36.5 (20 Oct 2018 03:09), Max: 37.3 (20 Oct 2018 00:30)  T(F): 97.7 (20 Oct 2018 03:09), Max: 99.1 (20 Oct 2018 00:30)  HR: 83 (20 Oct 2018 03:09) (83 - 101)  BP: 126/64 (20 Oct 2018 03:09) (121/71 - 126/64)  BP(mean): --  RR: 17 (20 Oct 2018 03:09) (17 - 18)  SpO2: 96% (20 Oct 2018 03:09) (96% - 96%)    GENERAL:  Older middle aged male, sleeping but arousable, not in acute distress, not answering questions  EYES:  Clear conjunctiva, pupils reactive to light  ENT: Moist mucous membranes  RESP:  Non-labored breathing pattern, lungs clear to ausculation in anterior fields  CV: Regular rate and rhythm, no murmurs appreciated, bilateral lower extremity edema  GI: Soft, non-distended, does not grimace upon deep palpation   : Ramirez in place  MSK: Left leg in brace  NEURO: Alert when aroused, unable to do exam as pt not participating and following commands, able to move both upper extremities spontaneous, withdraws lower extremity and moves toes to tactile stimuli   PSYCH: Calm, does not cooperate with full exam  SKIN: Bilateral skin discoloration of both legs

## 2018-10-20 NOTE — PROGRESS NOTE ADULT - SUBJECTIVE AND OBJECTIVE BOX
Patient is a 68y old  Male who presents with a chief complaint of sent from nursing home for Cr elevation   seen this morning with  Sandee , he open his eyes to verbal stimuli not able to keep his eyes open   discussed with renal attending re plan   H and P reviewed       HPI:  68y/oM hx mental retardation/developmental delay, HTN, DM, seizure disorder, tibial fracture with brace, Slovak speaking sent from [facility] for Cr elevation of 2.9.  Unclear if pt is minimally conversant at baseline, but pt unable to provide any hx to me or ED providers despite use of  as he is not answering questions, so hx obtained from chart review.  Pt was recently hospitalized at [facility] and discharged on 10/17 after admission for sepsis of unclear etiology with incidental finding of lytic bone lesion suspected to be a plasma cell dyscrasia.   Per documents, pt was unable to biopsy due to issues with obtaining consent and was discharged to rehab 2 days ago with plan for outpatient biopsy with a normal Cr level.  Pt was sent to hospital due to Cr of 2.9 with instruction for placement of Ronquillo catheter.  No documentation of pt having difficulty voiding or reporting pain.  Per ED RN, only thing pt has said during shift is when vocalizing he wants to urinate in Slovak, despite having Ronquillo in place.     Allergies    No Known Allergies    Intolerances        REVIEW OF SYSTEMS:    pt is lethargic not answer any question   Vital Signs Last 24 Hrs  T(C): 36.6 (20 Oct 2018 07:30), Max: 37.3 (20 Oct 2018 00:30)  T(F): 97.9 (20 Oct 2018 07:30), Max: 99.1 (20 Oct 2018 00:30)  HR: 77 (20 Oct 2018 07:30) (77 - 101)  BP: 120/62 (20 Oct 2018 07:30) (119/70 - 127/63)  BP(mean): --  RR: 18 (20 Oct 2018 07:30) (16 - 18)  SpO2: 97% (20 Oct 2018 07:30) (96% - 97%)    PHYSICAL EXAM:    GENERAL: NAD, obese lethargic   CHEST/LUNG: Clear to percussion bilaterally; No rales  HEART: Regular rate and rhythm; S1 / S2  No murmur   ABDOMEN: Soft, Nontender, Nondistended; Bowel sounds present  EXTREMITIES:  No clubbing, cyanosis, or edema   : ronquillo in place         LABS:                        11.1   10.9  )-----------( 318      ( 20 Oct 2018 00:31 )             35.8     10-20    141  |  102  |  33.0<H>  ----------------------------<  85  3.3<L>   |  23.0  |  1.64<H>    Ca    8.8      20 Oct 2018 12:58    TPro  7.6  /  Alb  3.2<L>  /  TBili  0.5  /  DBili  x   /  AST  15  /  ALT  12  /  AlkPhos  80  10-20      Urinalysis Basic - ( 20 Oct 2018 03:37 )    Color: Yellow / Appearance: Slightly Turbid / S.025 / pH: x  Gluc: x / Ketone: Trace  / Bili: Negative / Urobili: 1 mg/dL   Blood: x / Protein: 100 mg/dL / Nitrite: Negative   Leuk Esterase: Small / RBC: 0-2 /HPF / WBC 0-2   Sq Epi: x / Non Sq Epi: Occasional / Bacteria: x        RADIOLOGY & ADDITIONAL TESTS:

## 2018-10-21 LAB
GLUCOSE BLDC GLUCOMTR-MCNC: 135 MG/DL — HIGH (ref 70–99)
GLUCOSE BLDC GLUCOMTR-MCNC: 148 MG/DL — HIGH (ref 70–99)
GLUCOSE BLDC GLUCOMTR-MCNC: 165 MG/DL — HIGH (ref 70–99)
GLUCOSE BLDC GLUCOMTR-MCNC: 93 MG/DL — SIGNIFICANT CHANGE UP (ref 70–99)

## 2018-10-21 PROCEDURE — 99233 SBSQ HOSP IP/OBS HIGH 50: CPT

## 2018-10-21 RX ORDER — POTASSIUM CHLORIDE 20 MEQ
40 PACKET (EA) ORAL ONCE
Qty: 0 | Refills: 0 | Status: COMPLETED | OUTPATIENT
Start: 2018-10-21 | End: 2018-10-21

## 2018-10-21 RX ORDER — ACETAMINOPHEN 500 MG
1000 TABLET ORAL ONCE
Qty: 0 | Refills: 0 | Status: COMPLETED | OUTPATIENT
Start: 2018-10-21 | End: 2018-10-21

## 2018-10-21 RX ADMIN — HEPARIN SODIUM 5000 UNIT(S): 5000 INJECTION INTRAVENOUS; SUBCUTANEOUS at 13:45

## 2018-10-21 RX ADMIN — SIMETHICONE 80 MILLIGRAM(S): 80 TABLET, CHEWABLE ORAL at 11:21

## 2018-10-21 RX ADMIN — Medication 30 MILLIGRAM(S): at 04:57

## 2018-10-21 RX ADMIN — Medication 3 MILLIGRAM(S): at 22:35

## 2018-10-21 RX ADMIN — Medication 1000 MILLIGRAM(S): at 22:55

## 2018-10-21 RX ADMIN — PANTOPRAZOLE SODIUM 40 MILLIGRAM(S): 20 TABLET, DELAYED RELEASE ORAL at 04:57

## 2018-10-21 RX ADMIN — SIMETHICONE 80 MILLIGRAM(S): 80 TABLET, CHEWABLE ORAL at 04:57

## 2018-10-21 RX ADMIN — TAMSULOSIN HYDROCHLORIDE 0.4 MILLIGRAM(S): 0.4 CAPSULE ORAL at 22:35

## 2018-10-21 RX ADMIN — Medication 325 MILLIGRAM(S): at 11:21

## 2018-10-21 RX ADMIN — SODIUM CHLORIDE 85 MILLILITER(S): 9 INJECTION INTRAMUSCULAR; INTRAVENOUS; SUBCUTANEOUS at 04:54

## 2018-10-21 RX ADMIN — RISPERIDONE 0.25 MILLIGRAM(S): 4 TABLET ORAL at 17:20

## 2018-10-21 RX ADMIN — RISPERIDONE 0.25 MILLIGRAM(S): 4 TABLET ORAL at 04:57

## 2018-10-21 RX ADMIN — Medication 1 MILLIGRAM(S): at 11:21

## 2018-10-21 RX ADMIN — LEVETIRACETAM 1000 MILLIGRAM(S): 250 TABLET, FILM COATED ORAL at 17:20

## 2018-10-21 RX ADMIN — SIMETHICONE 80 MILLIGRAM(S): 80 TABLET, CHEWABLE ORAL at 17:20

## 2018-10-21 RX ADMIN — HEPARIN SODIUM 5000 UNIT(S): 5000 INJECTION INTRAVENOUS; SUBCUTANEOUS at 04:56

## 2018-10-21 RX ADMIN — SODIUM CHLORIDE 85 MILLILITER(S): 9 INJECTION INTRAMUSCULAR; INTRAVENOUS; SUBCUTANEOUS at 11:21

## 2018-10-21 RX ADMIN — LEVETIRACETAM 1000 MILLIGRAM(S): 250 TABLET, FILM COATED ORAL at 04:56

## 2018-10-21 RX ADMIN — ATORVASTATIN CALCIUM 80 MILLIGRAM(S): 80 TABLET, FILM COATED ORAL at 22:35

## 2018-10-21 RX ADMIN — HEPARIN SODIUM 5000 UNIT(S): 5000 INJECTION INTRAVENOUS; SUBCUTANEOUS at 22:35

## 2018-10-21 RX ADMIN — Medication 1: at 16:09

## 2018-10-21 RX ADMIN — SODIUM CHLORIDE 85 MILLILITER(S): 9 INJECTION INTRAMUSCULAR; INTRAVENOUS; SUBCUTANEOUS at 22:33

## 2018-10-21 RX ADMIN — Medication 400 MILLIGRAM(S): at 22:35

## 2018-10-21 NOTE — PROGRESS NOTE ADULT - SUBJECTIVE AND OBJECTIVE BOX
Patient is a 68y old  Male who presents with a chief complaint of sent from nursing home for Cr elevation   seen with  Sandee , he is more awake today , feels well he dies not know where he is   chart  reviewed , no overnight events reported       No Known Allergies    Intolerances      Vital Signs Last 24 Hrs  T(C): 36.7 (21 Oct 2018 08:22), Max: 36.8 (20 Oct 2018 23:37)  T(F): 98 (21 Oct 2018 08:22), Max: 98.3 (20 Oct 2018 23:37)  HR: 69 (21 Oct 2018 08:22) (64 - 69)  BP: 136/62 (21 Oct 2018 08:22) (102/56 - 136/62)  BP(mean): --  RR: 18 (21 Oct 2018 08:22) (18 - 18)  SpO2: 99% (21 Oct 2018 08:22) (97% - 99%)    PHYSICAL EXAM:    GENERAL: NAD, obese lethargic   CHEST/LUNG: Clear to auscultation bilaterally; No rales  HEART: Regular rate and rhythm; S1 / S2  ABDOMEN: Soft, Nontender, Nondistended; Bowel sounds present  EXTREMITIES:  No clubbing, cyanosis, or edema   : ronquillo in place yellow urine         LABS:                                       11.1   10.9  )-----------( 318      ( 20 Oct 2018 00:31 )             35.8   10-20    141  |  102  |  33.0<H>  ----------------------------<  85  3.3<L>   |  23.0  |  1.64<H>    Ca    8.8      20 Oct 2018 12:58    TPro  7.6  /  Alb  3.2<L>  /  TBili  0.5  /  DBili  x   /  AST  15  /  ALT  12  /  AlkPhos  80  10-20    RADIOLOGY & ADDITIONAL TESTS:

## 2018-10-21 NOTE — PROGRESS NOTE ADULT - SUBJECTIVE AND OBJECTIVE BOX
[facility] DIVISION OF KIDNEY DISEASES AND HYPERTENSION -- FOLLOW UP NOTE  --------------------------------------------------------------------------------  Chief Complaint: BOUCHRA    24 hour events/subjective:  Pt seen and examined  Lying in bed in NAD  SCr improving with IVF      PAST HISTORY  --------------------------------------------------------------------------------  No significant changes to PMH, PSH, FHx, SHx, unless otherwise noted    ALLERGIES & MEDICATIONS  --------------------------------------------------------------------------------  Allergies    No Known Allergies    Intolerances      Standing Inpatient Medications  aspirin enteric coated 325 milliGRAM(s) Oral daily  atorvastatin 80 milliGRAM(s) Oral at bedtime  dextrose 5%. 1000 milliLiter(s) IV Continuous <Continuous>  dextrose 50% Injectable 12.5 Gram(s) IV Push once  dextrose 50% Injectable 25 Gram(s) IV Push once  dextrose 50% Injectable 25 Gram(s) IV Push once  folic acid 1 milliGRAM(s) Oral daily  heparin  Injectable 5000 Unit(s) SubCutaneous every 8 hours  insulin lispro (HumaLOG) corrective regimen sliding scale   SubCutaneous three times a day before meals  insulin lispro (HumaLOG) corrective regimen sliding scale   SubCutaneous at bedtime  levETIRAcetam 1000 milliGRAM(s) Oral two times a day  melatonin 3 milliGRAM(s) Oral at bedtime  NIFEdipine XL 30 milliGRAM(s) Oral daily  pantoprazole    Tablet 40 milliGRAM(s) Oral before breakfast  risperiDONE   Tablet 0.25 milliGRAM(s) Oral two times a day  simethicone 80 milliGRAM(s) Chew four times a day  sodium chloride 0.9%. 1000 milliLiter(s) IV Continuous <Continuous>  tamsulosin 0.4 milliGRAM(s) Oral at bedtime    PRN Inpatient Medications  ALPRAZolam 0.25 milliGRAM(s) Oral two times a day PRN  dextrose 40% Gel 15 Gram(s) Oral once PRN  glucagon  Injectable 1 milliGRAM(s) IntraMuscular once PRN      REVIEW OF SYSTEMS  --------------------------------------------------------------------------------  Unable to obtain    VITALS/PHYSICAL EXAM  --------------------------------------------------------------------------------  T(C): 36.7 (10-21-18 @ 08:22), Max: 36.8 (10-20-18 @ 23:37)  HR: 69 (10-21-18 @ 08:22) (64 - 69)  BP: 136/62 (10-21-18 @ 08:22) (102/56 - 136/62)  RR: 18 (10-21-18 @ 08:22) (18 - 18)  SpO2: 99% (10-21-18 @ 08:22) (97% - 99%)  Wt(kg): --  Height (cm): 177.8 (10-19-18 @ 21:09)  Weight (kg): 102.1 (10-19-18 @ 21:09)  BMI (kg/m2): 32.3 (10-19-18 @ 21:09)  BSA (m2): 2.19 (10-19-18 @ 21:09)      10-20-18 @ 07:01  -  10-21-18 @ 07:00  --------------------------------------------------------  IN: 1560 mL / OUT: 2050 mL / NET: -490 mL      Physical Exam:  	GENERAL:  Older middle aged male, sleeping but arousable, not in acute distress, not answering questions  	EYES:  Clear conjunctiva, pupils reactive to light  	ENT: Moist mucous membranes  	RESP:  Non-labored breathing pattern, lungs clear to ausculation in anterior fields  	CV: Regular rate and rhythm, no murmurs appreciated, bilateral lower extremity edema  	GI: Soft, non-distended, does not grimace upon deep palpation   	: Ashley in place  	MSK: Left leg in brace  	NEURO: Alert when aroused, unable to do exam as pt not participating and following commands, able to move both upper extremities spontaneous, withdraws lower extremity and moves toes to tactile stimuli   	PSYCH: Calm, does not cooperate with full exam  SKIN: Bilateral skin discoloration of both legs  LABS/STUDIES  --------------------------------------------------------------------------------              11.1   10.9  >-----------<  318      [10-20-18 @ 00:31]              35.8     141  |  102  |  33.0  ----------------------------<  85      [10-20-18 @ 12:58]  3.3   |  23.0  |  1.64        Ca     8.8     [10-20-18 @ 12:58]    TPro  7.6  /  Alb  3.2  /  TBili  0.5  /  DBili  x   /  AST  15  /  ALT  12  /  AlkPhos  80  [10-20-18 @ 00:31]          Creatinine Trend:  SCr 1.64 [10-20 @ 12:58]  SCr 2.99 [10-20 @ 00:31]  SCr 0.92 [10-17 @ 08:33]  SCr 0.77 [10-15 @ 08:33]  SCr 0.73 [10-13 @ 08:26]    Urinalysis - [10-20-18 @ 03:37]      Color Yellow / Appearance Slightly Turbid / SG 1.025 / pH 5.0      Gluc Negative / Ketone Trace  / Bili Negative / Urobili 1       Blood Small / Protein 100 / Leuk Est Small / Nitrite Negative      RBC 0-2 / WBC 0-2 / Hyaline  / Gran  / Sq Epi  / Non Sq Epi Occasional / Bacteria     Urine Creatinine 100      [10-20-18 @ 09:03]  Urine Sodium 66      [10-20-18 @ 09:03]  Urine Potassium 23      [10-20-18 @ 09:03]  Urine Chloride 51      [10-20-18 @ 09:03]  Urine Osmolality 377      [10-20-18 @ 09:03]    Iron 38, TIBC 204, %sat 19      [10-03-18 @ 08:13]  Ferritin 115      [10-03-18 @ 08:13]  HbA1c 6.6      [09-18-18 @ 03:11]  TSH 1.45      [09-17-18 @ 01:52]  Lipid: chol 169, , HDL 33,       [10-16-18 @ 09:23]      Free Light Chains: kappa 6.52, lambda 6.96, ratio = 0.94      [10-04 @ 15:33]  Immunofixation Serum:   No Monoclonal Band Identified      [10-04-18 @ 15:33]  SPEP Interpretation: Normal Electrophoresis Pattern      [10-04-18 @ 15:33]  Immunofixation Urine: No Monoclonal Band Identified      [10-06-18 @ 19:47]  UPEP Interpretation: Moderately Selective (Glomerular) Proteinuria      [10-06-18 @ 19:47]

## 2018-10-22 DIAGNOSIS — Z71.89 OTHER SPECIFIED COUNSELING: ICD-10-CM

## 2018-10-22 LAB
ANION GAP SERPL CALC-SCNC: 11 MMOL/L — SIGNIFICANT CHANGE UP (ref 5–17)
BUN SERPL-MCNC: 12 MG/DL — SIGNIFICANT CHANGE UP (ref 8–20)
CALCIUM SERPL-MCNC: 8.8 MG/DL — SIGNIFICANT CHANGE UP (ref 8.6–10.2)
CHLORIDE SERPL-SCNC: 107 MMOL/L — SIGNIFICANT CHANGE UP (ref 98–107)
CO2 SERPL-SCNC: 27 MMOL/L — SIGNIFICANT CHANGE UP (ref 22–29)
CREAT SERPL-MCNC: 0.73 MG/DL — SIGNIFICANT CHANGE UP (ref 0.5–1.3)
FOLATE SERPL-MCNC: 19.8 NG/ML — SIGNIFICANT CHANGE UP
GLUCOSE BLDC GLUCOMTR-MCNC: 131 MG/DL — HIGH (ref 70–99)
GLUCOSE BLDC GLUCOMTR-MCNC: 149 MG/DL — HIGH (ref 70–99)
GLUCOSE BLDC GLUCOMTR-MCNC: 149 MG/DL — HIGH (ref 70–99)
GLUCOSE SERPL-MCNC: 108 MG/DL — SIGNIFICANT CHANGE UP (ref 70–115)
HCT VFR BLD CALC: 30.9 % — LOW (ref 42–52)
HGB BLD-MCNC: 9.6 G/DL — LOW (ref 14–18)
IRON SATN MFR SERPL: 24 % — SIGNIFICANT CHANGE UP (ref 16–55)
IRON SATN MFR SERPL: 41 UG/DL — LOW (ref 59–158)
KAPPA LC SER QL IFE: 9.18 MG/DL — HIGH (ref 0.33–1.94)
KAPPA/LAMBDA FREE LIGHT CHAIN RATIO, SERUM: 1.24 RATIO — SIGNIFICANT CHANGE UP (ref 0.26–1.65)
LAMBDA LC SER QL IFE: 7.41 MG/DL — HIGH (ref 0.57–2.63)
MCHC RBC-ENTMCNC: 27.8 PG — SIGNIFICANT CHANGE UP (ref 27–31)
MCHC RBC-ENTMCNC: 31.1 G/DL — LOW (ref 32–36)
MCV RBC AUTO: 89.6 FL — SIGNIFICANT CHANGE UP (ref 80–94)
PHOSPHATE SERPL-MCNC: 2.1 MG/DL — LOW (ref 2.4–4.7)
PLATELET # BLD AUTO: 276 K/UL — SIGNIFICANT CHANGE UP (ref 150–400)
POTASSIUM SERPL-MCNC: 3.7 MMOL/L — SIGNIFICANT CHANGE UP (ref 3.5–5.3)
POTASSIUM SERPL-SCNC: 3.7 MMOL/L — SIGNIFICANT CHANGE UP (ref 3.5–5.3)
RBC # BLD: 3.45 M/UL — LOW (ref 4.6–6.2)
RBC # FLD: 16 % — HIGH (ref 11–15.6)
SODIUM SERPL-SCNC: 145 MMOL/L — SIGNIFICANT CHANGE UP (ref 135–145)
TIBC SERPL-MCNC: 169 UG/DL — LOW (ref 220–430)
TRANSFERRIN SERPL-MCNC: 118 MG/DL — LOW (ref 180–329)
VIT B12 SERPL-MCNC: 188 PG/ML — LOW (ref 232–1245)
WBC # BLD: 5.2 K/UL — SIGNIFICANT CHANGE UP (ref 4.8–10.8)
WBC # FLD AUTO: 5.2 K/UL — SIGNIFICANT CHANGE UP (ref 4.8–10.8)

## 2018-10-22 PROCEDURE — 99233 SBSQ HOSP IP/OBS HIGH 50: CPT

## 2018-10-22 RX ORDER — POTASSIUM CHLORIDE 20 MEQ
40 PACKET (EA) ORAL ONCE
Qty: 0 | Refills: 0 | Status: COMPLETED | OUTPATIENT
Start: 2018-10-22 | End: 2018-10-22

## 2018-10-22 RX ORDER — SODIUM,POTASSIUM PHOSPHATES 278-250MG
1 POWDER IN PACKET (EA) ORAL THREE TIMES A DAY
Qty: 0 | Refills: 0 | Status: COMPLETED | OUTPATIENT
Start: 2018-10-22 | End: 2018-10-23

## 2018-10-22 RX ORDER — HEPARIN SODIUM 5000 [USP'U]/ML
5000 INJECTION INTRAVENOUS; SUBCUTANEOUS EVERY 8 HOURS
Qty: 0 | Refills: 0 | Status: DISCONTINUED | OUTPATIENT
Start: 2018-10-22 | End: 2018-10-25

## 2018-10-22 RX ORDER — TRAMADOL HYDROCHLORIDE 50 MG/1
25 TABLET ORAL EVERY 6 HOURS
Qty: 0 | Refills: 0 | Status: DISCONTINUED | OUTPATIENT
Start: 2018-10-22 | End: 2018-10-25

## 2018-10-22 RX ADMIN — TRAMADOL HYDROCHLORIDE 25 MILLIGRAM(S): 50 TABLET ORAL at 08:53

## 2018-10-22 RX ADMIN — Medication 3 MILLIGRAM(S): at 22:42

## 2018-10-22 RX ADMIN — PANTOPRAZOLE SODIUM 40 MILLIGRAM(S): 20 TABLET, DELAYED RELEASE ORAL at 07:43

## 2018-10-22 RX ADMIN — Medication 1 PACKET(S): at 22:42

## 2018-10-22 RX ADMIN — LEVETIRACETAM 1000 MILLIGRAM(S): 250 TABLET, FILM COATED ORAL at 07:43

## 2018-10-22 RX ADMIN — RISPERIDONE 0.25 MILLIGRAM(S): 4 TABLET ORAL at 17:33

## 2018-10-22 RX ADMIN — TRAMADOL HYDROCHLORIDE 25 MILLIGRAM(S): 50 TABLET ORAL at 10:00

## 2018-10-22 RX ADMIN — LEVETIRACETAM 1000 MILLIGRAM(S): 250 TABLET, FILM COATED ORAL at 17:33

## 2018-10-22 RX ADMIN — SODIUM CHLORIDE 85 MILLILITER(S): 9 INJECTION INTRAMUSCULAR; INTRAVENOUS; SUBCUTANEOUS at 11:17

## 2018-10-22 RX ADMIN — Medication 325 MILLIGRAM(S): at 11:17

## 2018-10-22 RX ADMIN — SIMETHICONE 80 MILLIGRAM(S): 80 TABLET, CHEWABLE ORAL at 22:46

## 2018-10-22 RX ADMIN — Medication 40 MILLIEQUIVALENT(S): at 06:00

## 2018-10-22 RX ADMIN — HEPARIN SODIUM 5000 UNIT(S): 5000 INJECTION INTRAVENOUS; SUBCUTANEOUS at 13:31

## 2018-10-22 RX ADMIN — HEPARIN SODIUM 5000 UNIT(S): 5000 INJECTION INTRAVENOUS; SUBCUTANEOUS at 22:42

## 2018-10-22 RX ADMIN — Medication 1 MILLIGRAM(S): at 11:17

## 2018-10-22 RX ADMIN — SIMETHICONE 80 MILLIGRAM(S): 80 TABLET, CHEWABLE ORAL at 07:44

## 2018-10-22 RX ADMIN — TAMSULOSIN HYDROCHLORIDE 0.4 MILLIGRAM(S): 0.4 CAPSULE ORAL at 22:42

## 2018-10-22 RX ADMIN — Medication 30 MILLIGRAM(S): at 07:43

## 2018-10-22 RX ADMIN — ATORVASTATIN CALCIUM 80 MILLIGRAM(S): 80 TABLET, FILM COATED ORAL at 22:42

## 2018-10-22 RX ADMIN — SIMETHICONE 80 MILLIGRAM(S): 80 TABLET, CHEWABLE ORAL at 17:33

## 2018-10-22 RX ADMIN — SIMETHICONE 80 MILLIGRAM(S): 80 TABLET, CHEWABLE ORAL at 11:17

## 2018-10-22 RX ADMIN — Medication 1 PACKET(S): at 16:28

## 2018-10-22 RX ADMIN — RISPERIDONE 0.25 MILLIGRAM(S): 4 TABLET ORAL at 07:44

## 2018-10-22 NOTE — PROGRESS NOTE ADULT - SUBJECTIVE AND OBJECTIVE BOX
Patient is a 68y old  Male who presents with a chief complaint of sent from nursing home for Cr elevation   He is awake alert ;spoke to him this morning with Indonesian speaking    c/o left leg knee pain , voiding ronquillo was removed   his care giver niece is at the bedside now , discussed with her in details about findings and plan of care   no overnight events reported       Vital Signs Last 24 Hrs  T(C): 36.9 (22 Oct 2018 08:15), Max: 36.9 (21 Oct 2018 16:01)  T(F): 98.4 (22 Oct 2018 08:15), Max: 98.5 (22 Oct 2018 00:01)  HR: 75 (22 Oct 2018 08:15) (66 - 75)  BP: 134/69 (22 Oct 2018 08:15) (113/53 - 137/62)  BP(mean): --  RR: 19 (22 Oct 2018 08:15) (18 - 19)  SpO2: 96% (22 Oct 2018 08:15) (96% - 98%)  PHYSICAL EXAM:    GENERAL: NAD, obese lethargic   CHEST/LUNG: Clear to auscultation bilaterally; No rales  HEART: Regular rate and rhythm; S1 / S2  ABDOMEN: Soft, Nontender, Nondistended; Bowel sounds present  EXTREMITIES:  No clubbing, cyanosis, or edema, LLE knee immobilizer in place   ronquillo removed yesterday     CAPILLARY BLOOD GLUCOSE      POCT Blood Glucose.: 149 mg/dL (22 Oct 2018 11:14)  POCT Blood Glucose.: 148 mg/dL (21 Oct 2018 23:54)  POCT Blood Glucose.: 165 mg/dL (21 Oct 2018 16:08)    LABS:                      < from: US Renal (10.20.18 @ 17:07) >  IMPRESSION:     Normal renal ultrasound.  Note visualization of the left kidney was limited.        < end of copied text >                        9.6    5.2   )-----------( 276      ( 22 Oct 2018 08:26 )             30.9   10-22    145  |  107  |  12.0  ----------------------------<  108  3.7   |  27.0  |  0.73    Ca    8.8      22 Oct 2018 08:26  Phos  2.1     10-22

## 2018-10-23 LAB
-  AMPICILLIN: SIGNIFICANT CHANGE UP
-  CIPROFLOXACIN: SIGNIFICANT CHANGE UP
-  DAPTOMYCIN: SIGNIFICANT CHANGE UP
-  LEVOFLOXACIN: SIGNIFICANT CHANGE UP
-  LINEZOLID: SIGNIFICANT CHANGE UP
-  NITROFURANTOIN: SIGNIFICANT CHANGE UP
-  TETRACYCLINE: SIGNIFICANT CHANGE UP
-  VANCOMYCIN: SIGNIFICANT CHANGE UP
CULTURE RESULTS: SIGNIFICANT CHANGE UP
GLUCOSE BLDC GLUCOMTR-MCNC: 126 MG/DL — HIGH (ref 70–99)
GLUCOSE BLDC GLUCOMTR-MCNC: 144 MG/DL — HIGH (ref 70–99)
GLUCOSE BLDC GLUCOMTR-MCNC: 99 MG/DL — SIGNIFICANT CHANGE UP (ref 70–99)
INTERPRETATION SERPL IFE-IMP: SIGNIFICANT CHANGE UP
METHOD TYPE: SIGNIFICANT CHANGE UP
ORGANISM # SPEC MICROSCOPIC CNT: SIGNIFICANT CHANGE UP
ORGANISM # SPEC MICROSCOPIC CNT: SIGNIFICANT CHANGE UP
SPECIMEN SOURCE: SIGNIFICANT CHANGE UP

## 2018-10-23 PROCEDURE — 99233 SBSQ HOSP IP/OBS HIGH 50: CPT

## 2018-10-23 PROCEDURE — 72195 MRI PELVIS W/O DYE: CPT | Mod: 26

## 2018-10-23 RX ORDER — PREGABALIN 225 MG/1
1000 CAPSULE ORAL ONCE
Qty: 0 | Refills: 0 | Status: COMPLETED | OUTPATIENT
Start: 2018-10-23 | End: 2018-10-23

## 2018-10-23 RX ORDER — IRON SUCROSE 20 MG/ML
200 INJECTION, SOLUTION INTRAVENOUS EVERY 24 HOURS
Qty: 0 | Refills: 0 | Status: DISCONTINUED | OUTPATIENT
Start: 2018-10-23 | End: 2018-10-25

## 2018-10-23 RX ORDER — PREGABALIN 225 MG/1
1000 CAPSULE ORAL DAILY
Qty: 0 | Refills: 0 | Status: COMPLETED | OUTPATIENT
Start: 2018-10-23 | End: 2018-10-25

## 2018-10-23 RX ADMIN — LEVETIRACETAM 1000 MILLIGRAM(S): 250 TABLET, FILM COATED ORAL at 06:08

## 2018-10-23 RX ADMIN — ATORVASTATIN CALCIUM 80 MILLIGRAM(S): 80 TABLET, FILM COATED ORAL at 21:21

## 2018-10-23 RX ADMIN — TAMSULOSIN HYDROCHLORIDE 0.4 MILLIGRAM(S): 0.4 CAPSULE ORAL at 21:21

## 2018-10-23 RX ADMIN — HEPARIN SODIUM 5000 UNIT(S): 5000 INJECTION INTRAVENOUS; SUBCUTANEOUS at 06:08

## 2018-10-23 RX ADMIN — SIMETHICONE 80 MILLIGRAM(S): 80 TABLET, CHEWABLE ORAL at 12:23

## 2018-10-23 RX ADMIN — Medication 1 PACKET(S): at 06:10

## 2018-10-23 RX ADMIN — PREGABALIN 1000 MICROGRAM(S): 225 CAPSULE ORAL at 17:34

## 2018-10-23 RX ADMIN — SIMETHICONE 80 MILLIGRAM(S): 80 TABLET, CHEWABLE ORAL at 17:36

## 2018-10-23 RX ADMIN — LEVETIRACETAM 1000 MILLIGRAM(S): 250 TABLET, FILM COATED ORAL at 17:35

## 2018-10-23 RX ADMIN — HEPARIN SODIUM 5000 UNIT(S): 5000 INJECTION INTRAVENOUS; SUBCUTANEOUS at 14:22

## 2018-10-23 RX ADMIN — RISPERIDONE 0.25 MILLIGRAM(S): 4 TABLET ORAL at 06:08

## 2018-10-23 RX ADMIN — Medication 1 MILLIGRAM(S): at 12:23

## 2018-10-23 RX ADMIN — HEPARIN SODIUM 5000 UNIT(S): 5000 INJECTION INTRAVENOUS; SUBCUTANEOUS at 21:21

## 2018-10-23 RX ADMIN — SIMETHICONE 80 MILLIGRAM(S): 80 TABLET, CHEWABLE ORAL at 06:08

## 2018-10-23 RX ADMIN — Medication 40 MILLIEQUIVALENT(S): at 12:23

## 2018-10-23 RX ADMIN — RISPERIDONE 0.25 MILLIGRAM(S): 4 TABLET ORAL at 17:36

## 2018-10-23 RX ADMIN — TRAMADOL HYDROCHLORIDE 25 MILLIGRAM(S): 50 TABLET ORAL at 06:08

## 2018-10-23 RX ADMIN — PANTOPRAZOLE SODIUM 40 MILLIGRAM(S): 20 TABLET, DELAYED RELEASE ORAL at 06:08

## 2018-10-23 RX ADMIN — Medication 1 MILLIGRAM(S): at 22:46

## 2018-10-23 RX ADMIN — Medication 325 MILLIGRAM(S): at 14:22

## 2018-10-23 RX ADMIN — IRON SUCROSE 110 MILLIGRAM(S): 20 INJECTION, SOLUTION INTRAVENOUS at 17:32

## 2018-10-23 RX ADMIN — Medication 30 MILLIGRAM(S): at 06:08

## 2018-10-23 NOTE — PROGRESS NOTE ADULT - SUBJECTIVE AND OBJECTIVE BOX
[facility]  MD Ratna Moses MD    Answering Tracie : [phone]    CARLOS OLEA-[id number]    INTERVAL HPI/OVERNIGHT EVENTS:    Vital Signs Last 24 Hrs  T(C): 36.7 (23 Oct 2018 00:56), Max: 37.1 (22 Oct 2018 16:17)  T(F): 98 (23 Oct 2018 00:56), Max: 98.7 (22 Oct 2018 16:17)  HR: 76 (23 Oct 2018 00:56) (64 - 76)  BP: 151/76 (23 Oct 2018 00:56) (126/62 - 151/76)  BP(mean): --  RR: 18 (23 Oct 2018 00:56) (18 - 18)  SpO2: 96% (23 Oct 2018 00:56) (96% - 96%)  ANTIBIOTICS      Allergies    No Known Allergies    Intolerances        REVIEW OF SYSTEMS:    CONSTITUTIONAL:  As per HPI.    HEENT:  Eyes:  No diplopia or blurred vision. ENT:  No earache, sore throat or runny nose.    CARDIOVASCULAR:  No pressure, squeezing, strangling, tightness, heaviness or aching about the chest, neck, axilla or epigastrium.    RESPIRATORY:  No cough, shortness of breath, PND or orthopnea.    GASTROINTESTINAL:  No nausea, vomiting or diarrhea.    GENITOURINARY:  No dysuria, frequency or urgency.    MUSCULOSKELETAL:  As per HPI.    SKIN:  No change in skin, hair or nails.    NEUROLOGIC:  CNI,MOTOR WNL, SENSORY WNL.    ENDOCRINE:  No heat or cold intolerance, polyuria or polydipsia.    HEMATOLOGICAL:  No easy bruising or bleeding.           PHYSICAL EXAMINATION:    GENERAL: The patient is a well-developed, well-nourished _____in no apparent distress. ___ is alert and oriented x3.    VITAL SIGNS:     HEENT: Head is normocephalic and atraumatic. Extraocular muscles are intact. Pupils are equal, round, and reactive to light and accommodation. Nares appeared normal. Mouth is well hydrated and without lesions. Mucous membranes are moist. Posterior pharynx clear of any exudate or lesions.    NECK: Supple. No carotid bruits.  No lymphadenopathy or thyromegaly.    LUNGS: Clear to auscultation.    HEART: Regular rate and rhythm without murmur.    ABDOMEN: Soft, nontender, and nondistended.  Positive bowel sounds.  No hepatosplenomegaly was noted.    EXTREMITIES: Without any cyanosis, clubbing, rash, lesions or edema.    NEUROLOGIC: Cranial nerves II through XII are grossly intact.    PSYCHIATRIC: Flat affect, but denies suicidal or homicidal ideations.  SKIN: No ulceration or induration present.      LABS:                        9.6    Ferritin, Serum in AM (10.03.18 @ 08:13)    Ferritin, Serum: 115 ng/mL    5.2   )-----------( 276      ( 22 Oct 2018 08:26 )             30.9     10-22    145  |  107  |  12.0  ----------------------------<  108  3.7   |  27.0  |  0.73    Ca    8.8      22 Oct 2018 08:26  Phos  2.1     10-22                RADIOLOGY & ADDITIONAL STUDIES:          ASSESSMENT:    68 yr old male known to us from his previous admission with incidental finding of lytic bone lesions in iliac bones   Blood work up for myeloma negative with normal serum protein studies  Mild degree of anemia, likely anemia of chronic disease   Anemia work up revealing borderline low b12 levels  Ir to evaluate for possible CT guided bx  Will order B12 injection       PLAN:        Grace Casillas MD

## 2018-10-23 NOTE — PROGRESS NOTE ADULT - PROBLEM SELECTOR PLAN 4
discussed with his niece care giver   they prefer to take him home  likely discharge in am if remains stable

## 2018-10-23 NOTE — PROGRESS NOTE ADULT - SUBJECTIVE AND OBJECTIVE BOX
Patient is a 68y old  Male who presents with a chief complaint of sent from nursing home for Cr elevation   He is awake alert seen with  with Sandee   he is feeling  well, knee pain controlled   hematology , MR result reviewed   spoke to IR no biopsy per hem onc , bone lesion on MR c/w       Vital Signs Last 24 Hrs  T(C): 36.7 (23 Oct 2018 00:56), Max: 37.1 (22 Oct 2018 16:17)  T(F): 98 (23 Oct 2018 00:56), Max: 98.7 (22 Oct 2018 16:17)  HR: 76 (23 Oct 2018 00:56) (64 - 76)  BP: 151/76 (23 Oct 2018 00:56) (126/62 - 151/76)  BP(mean): --  RR: 18 (23 Oct 2018 00:56) (18 - 18)  SpO2: 96% (23 Oct 2018 00:56) (96% - 96%)    PHYSICAL EXAM:  GENERAL: NAD, obese lethargic   CHEST/LUNG: Clear to auscultation bilaterally; No rales  HEART: Regular rate and rhythm; S1 / S2  ABDOMEN: Soft, Nontender, Nondistended; Bowel sounds present  EXTREMITIES:  No clubbing, cyanosis, or edema, LLE knee immobilizer in place     Vitamin B12, Serum: 188 pg/mL (10.22.18 @ 08:26)  10-22    145  |  107  |  12.0  ----------------------------<  108  3.7   |  27.0  |  0.73    Ca    8.8      22 Oct 2018 08:26  Phos  2.1     10-22                          9.6    5.2   )-----------( 276      ( 22 Oct 2018 08:26 )             30.9

## 2018-10-24 DIAGNOSIS — S82.209A UNSPECIFIED FRACTURE OF SHAFT OF UNSPECIFIED TIBIA, INITIAL ENCOUNTER FOR CLOSED FRACTURE: ICD-10-CM

## 2018-10-24 LAB
GLUCOSE BLDC GLUCOMTR-MCNC: 104 MG/DL — HIGH (ref 70–99)
GLUCOSE BLDC GLUCOMTR-MCNC: 116 MG/DL — HIGH (ref 70–99)
GLUCOSE BLDC GLUCOMTR-MCNC: 122 MG/DL — HIGH (ref 70–99)
GLUCOSE BLDC GLUCOMTR-MCNC: 130 MG/DL — HIGH (ref 70–99)

## 2018-10-24 PROCEDURE — 87184 SC STD DISK METHOD PER PLATE: CPT

## 2018-10-24 PROCEDURE — 71045 X-RAY EXAM CHEST 1 VIEW: CPT

## 2018-10-24 PROCEDURE — C1751: CPT

## 2018-10-24 PROCEDURE — 81001 URINALYSIS AUTO W/SCOPE: CPT

## 2018-10-24 PROCEDURE — 82553 CREATINE MB FRACTION: CPT

## 2018-10-24 PROCEDURE — 87186 SC STD MICRODIL/AGAR DIL: CPT

## 2018-10-24 PROCEDURE — 97116 GAIT TRAINING THERAPY: CPT

## 2018-10-24 PROCEDURE — 80048 BASIC METABOLIC PNL TOTAL CA: CPT

## 2018-10-24 PROCEDURE — 36415 COLL VENOUS BLD VENIPUNCTURE: CPT

## 2018-10-24 PROCEDURE — 78803 RP LOCLZJ TUM SPECT 1 AREA: CPT

## 2018-10-24 PROCEDURE — T1013: CPT

## 2018-10-24 PROCEDURE — 74176 CT ABD & PELVIS W/O CONTRAST: CPT

## 2018-10-24 PROCEDURE — 97163 PT EVAL HIGH COMPLEX 45 MIN: CPT

## 2018-10-24 PROCEDURE — 86850 RBC ANTIBODY SCREEN: CPT

## 2018-10-24 PROCEDURE — 87150 DNA/RNA AMPLIFIED PROBE: CPT

## 2018-10-24 PROCEDURE — 96361 HYDRATE IV INFUSION ADD-ON: CPT

## 2018-10-24 PROCEDURE — 36555 INSERT NON-TUNNEL CV CATH: CPT

## 2018-10-24 PROCEDURE — 85027 COMPLETE CBC AUTOMATED: CPT

## 2018-10-24 PROCEDURE — 80053 COMPREHEN METABOLIC PANEL: CPT

## 2018-10-24 PROCEDURE — 85730 THROMBOPLASTIN TIME PARTIAL: CPT

## 2018-10-24 PROCEDURE — 83690 ASSAY OF LIPASE: CPT

## 2018-10-24 PROCEDURE — 83735 ASSAY OF MAGNESIUM: CPT

## 2018-10-24 PROCEDURE — 97530 THERAPEUTIC ACTIVITIES: CPT

## 2018-10-24 PROCEDURE — 87040 BLOOD CULTURE FOR BACTERIA: CPT

## 2018-10-24 PROCEDURE — 96375 TX/PRO/DX INJ NEW DRUG ADDON: CPT | Mod: XU

## 2018-10-24 PROCEDURE — 82550 ASSAY OF CK (CPK): CPT

## 2018-10-24 PROCEDURE — A9561: CPT

## 2018-10-24 PROCEDURE — 83036 HEMOGLOBIN GLYCOSYLATED A1C: CPT

## 2018-10-24 PROCEDURE — 99291 CRITICAL CARE FIRST HOUR: CPT | Mod: 25

## 2018-10-24 PROCEDURE — 87086 URINE CULTURE/COLONY COUNT: CPT

## 2018-10-24 PROCEDURE — 73560 X-RAY EXAM OF KNEE 1 OR 2: CPT

## 2018-10-24 PROCEDURE — 86900 BLOOD TYPING SEROLOGIC ABO: CPT

## 2018-10-24 PROCEDURE — 85610 PROTHROMBIN TIME: CPT

## 2018-10-24 PROCEDURE — 84484 ASSAY OF TROPONIN QUANT: CPT

## 2018-10-24 PROCEDURE — 86901 BLOOD TYPING SEROLOGIC RH(D): CPT

## 2018-10-24 PROCEDURE — 83605 ASSAY OF LACTIC ACID: CPT

## 2018-10-24 PROCEDURE — 73562 X-RAY EXAM OF KNEE 3: CPT | Mod: 26,LT

## 2018-10-24 PROCEDURE — 96374 THER/PROPH/DIAG INJ IV PUSH: CPT | Mod: XU

## 2018-10-24 PROCEDURE — 84443 ASSAY THYROID STIM HORMONE: CPT

## 2018-10-24 PROCEDURE — 84100 ASSAY OF PHOSPHORUS: CPT

## 2018-10-24 PROCEDURE — 93005 ELECTROCARDIOGRAM TRACING: CPT

## 2018-10-24 PROCEDURE — 99232 SBSQ HOSP IP/OBS MODERATE 35: CPT

## 2018-10-24 PROCEDURE — 78306 BONE IMAGING WHOLE BODY: CPT

## 2018-10-24 PROCEDURE — 82962 GLUCOSE BLOOD TEST: CPT

## 2018-10-24 RX ORDER — METFORMIN HYDROCHLORIDE 850 MG/1
1 TABLET ORAL
Qty: 0 | Refills: 0 | COMMUNITY

## 2018-10-24 RX ORDER — ROSUVASTATIN CALCIUM 5 MG/1
1 TABLET ORAL
Qty: 0 | Refills: 0 | COMMUNITY

## 2018-10-24 RX ORDER — FOLIC ACID 0.8 MG
1 TABLET ORAL
Qty: 0 | Refills: 0 | COMMUNITY

## 2018-10-24 RX ADMIN — HEPARIN SODIUM 5000 UNIT(S): 5000 INJECTION INTRAVENOUS; SUBCUTANEOUS at 14:55

## 2018-10-24 RX ADMIN — Medication 3 MILLIGRAM(S): at 20:53

## 2018-10-24 RX ADMIN — IRON SUCROSE 110 MILLIGRAM(S): 20 INJECTION, SOLUTION INTRAVENOUS at 17:16

## 2018-10-24 RX ADMIN — SIMETHICONE 80 MILLIGRAM(S): 80 TABLET, CHEWABLE ORAL at 17:16

## 2018-10-24 RX ADMIN — RISPERIDONE 0.25 MILLIGRAM(S): 4 TABLET ORAL at 17:16

## 2018-10-24 RX ADMIN — PANTOPRAZOLE SODIUM 40 MILLIGRAM(S): 20 TABLET, DELAYED RELEASE ORAL at 05:08

## 2018-10-24 RX ADMIN — HEPARIN SODIUM 5000 UNIT(S): 5000 INJECTION INTRAVENOUS; SUBCUTANEOUS at 05:09

## 2018-10-24 RX ADMIN — PREGABALIN 1000 MICROGRAM(S): 225 CAPSULE ORAL at 12:00

## 2018-10-24 RX ADMIN — ATORVASTATIN CALCIUM 80 MILLIGRAM(S): 80 TABLET, FILM COATED ORAL at 20:54

## 2018-10-24 RX ADMIN — PREGABALIN 1000 MICROGRAM(S): 225 CAPSULE ORAL at 11:47

## 2018-10-24 RX ADMIN — Medication 0.5 MILLIGRAM(S): at 07:06

## 2018-10-24 RX ADMIN — LEVETIRACETAM 1000 MILLIGRAM(S): 250 TABLET, FILM COATED ORAL at 05:08

## 2018-10-24 RX ADMIN — Medication 325 MILLIGRAM(S): at 12:00

## 2018-10-24 RX ADMIN — SIMETHICONE 80 MILLIGRAM(S): 80 TABLET, CHEWABLE ORAL at 12:00

## 2018-10-24 RX ADMIN — HEPARIN SODIUM 5000 UNIT(S): 5000 INJECTION INTRAVENOUS; SUBCUTANEOUS at 21:02

## 2018-10-24 RX ADMIN — Medication 1 MILLIGRAM(S): at 12:00

## 2018-10-24 RX ADMIN — SIMETHICONE 80 MILLIGRAM(S): 80 TABLET, CHEWABLE ORAL at 05:09

## 2018-10-24 RX ADMIN — LEVETIRACETAM 1000 MILLIGRAM(S): 250 TABLET, FILM COATED ORAL at 17:16

## 2018-10-24 RX ADMIN — TAMSULOSIN HYDROCHLORIDE 0.4 MILLIGRAM(S): 0.4 CAPSULE ORAL at 20:53

## 2018-10-24 RX ADMIN — Medication 30 MILLIGRAM(S): at 05:09

## 2018-10-24 RX ADMIN — RISPERIDONE 0.25 MILLIGRAM(S): 4 TABLET ORAL at 05:07

## 2018-10-24 NOTE — PHYSICAL THERAPY INITIAL EVALUATION ADULT - ADDITIONAL COMMENTS
Family states that pt. has a wheelchair, bedside commode and hospital bed from prior admission; maria brace for the left knee.

## 2018-10-24 NOTE — PHYSICAL THERAPY INITIAL EVALUATION ADULT - CRITERIA FOR SKILLED THERAPEUTIC INTERVENTIONS
anticipated equipment needs at discharge/anticipated discharge recommendation/rehab potential/impairments found/functional limitations in following categories

## 2018-10-24 NOTE — PROGRESS NOTE ADULT - SUBJECTIVE AND OBJECTIVE BOX
seen in am with  Abigail   Pt is agitated  overnight and received ativan, now calm , wants to go home   Pt evaluation pending       Vital Signs Last 24 Hrs  T(C): 36.4 (24 Oct 2018 09:02), Max: 36.6 (23 Oct 2018 15:37)  T(F): 97.5 (24 Oct 2018 09:02), Max: 97.9 (23 Oct 2018 15:37)  HR: 99 (24 Oct 2018 09:02) (71 - 100)  BP: 132/75 (24 Oct 2018 09:02) (131/78 - 134/63)  BP(mean): --  RR: 19 (24 Oct 2018 09:02) (18 - 19)  SpO2: 95% (24 Oct 2018 09:02) (95% - 96%)    PHYSICAL EXAM:  GENERAL: NAD, obese   CHEST/LUNG: Clear to auscultation bilaterally; No rales  HEART: Regular rate and rhythm; S1 / S2  ABDOMEN: Soft, Nontender, Nondistended; Bowel sounds present  EXTREMITIES:  No clubbing, cyanosis, or edema, LLE knee immobilizer in place     Vitamin B12, Serum: 188 pg/mL (10.22.18 @ 08:26)

## 2018-10-24 NOTE — CHART NOTE - NSCHARTNOTEFT_GEN_A_CORE
Late entry PA note:    Called by nurse around 22:30 because patient at the time was yelling loudly and trying to get out of bed. RN states that Pt is A&Ox1 at baseline. VSS. Ativan 1mg IV given x1 for safety. While at bedside, Pt is lying in bed sleeping comfortably. In NAD and breathing non labored. Continue to monitor and call PA for changes in behavior or stability. Will reassess as needed.

## 2018-10-24 NOTE — PROGRESS NOTE ADULT - PROBLEM SELECTOR PLAN 6
cronic anemia with vit b 12 deficiency   added vit b 12 injection  folate level low cont folic acid cont current therapy controlled

## 2018-10-24 NOTE — PROGRESS NOTE ADULT - SUBJECTIVE AND OBJECTIVE BOX
[facility]  MD Ratna Moses MD  [phone]  Answering Tracie : [phone]    CARLOS OLEA-[id number]    INTERVAL HPI/OVERNIGHT EVENTS:    Vital Signs Last 24 Hrs  T(C): 36.4 (24 Oct 2018 09:02), Max: 36.6 (23 Oct 2018 15:37)  T(F): 97.5 (24 Oct 2018 09:02), Max: 97.9 (23 Oct 2018 15:37)  HR: 99 (24 Oct 2018 09:02) (71 - 100)  BP: 132/75 (24 Oct 2018 09:02) (131/78 - 134/63)  BP(mean): --  RR: 19 (24 Oct 2018 09:02) (18 - 19)  SpO2: 95% (24 Oct 2018 09:02) (95% - 96%)  ANTIBIOTICS      Allergies    No Known Allergies    Intolerances        REVIEW OF SYSTEMS:    CONSTITUTIONAL:  As per HPI.    HEENT:  Eyes:  No diplopia or blurred vision. ENT:  No earache, sore throat or runny nose.    CARDIOVASCULAR:  No pressure, squeezing, strangling, tightness, heaviness or aching about the chest, neck, axilla or epigastrium.    RESPIRATORY:  No cough, shortness of breath, PND or orthopnea.    GASTROINTESTINAL:  No nausea, vomiting or diarrhea.    GENITOURINARY:  No dysuria, frequency or urgency.    MUSCULOSKELETAL:  As per HPI.    SKIN:  No change in skin, hair or nails.    NEUROLOGIC:  CNI,MOTOR WNL, SENSORY WNL.    ENDOCRINE:  No heat or cold intolerance, polyuria or polydipsia.    HEMATOLOGICAL:  No easy bruising or bleeding.           PHYSICAL EXAMINATION:    GENERAL: The patient is a well-developed, well-nourished _____in no apparent distress. ___ is alert and oriented x3.    VITAL SIGNS:     HEENT: Head is normocephalic and atraumatic. Extraocular muscles are intact. Pupils are equal, round, and reactive to light and accommodation. Nares appeared normal. Mouth is well hydrated and without lesions. Mucous membranes are moist. Posterior pharynx clear of any exudate or lesions.    NECK: Supple. No carotid bruits.  No lymphadenopathy or thyromegaly.    LUNGS: Clear to auscultation.    HEART: Regular rate and rhythm without murmur.    ABDOMEN: Soft, nontender, and nondistended.  Positive bowel sounds.  No hepatosplenomegaly was noted.    EXTREMITIES: Without any cyanosis, clubbing, rash, lesions or edema.    NEUROLOGIC: Cranial nerves II through XII are grossly intact.    PSYCHIATRIC: Flat affect, but denies suicidal or homicidal ideations.  SKIN: No ulceration or induration present.      LABS:                    RADIOLOGY & ADDITIONAL STUDIES:          ASSESSMENT:  68 yr old male known to us from his previous admission with incidental finding of lytic bone lesions in iliac bones   Blood work up for myeloma negative with normal serum protein studies  Mild degree of anemia, likely anemia of chronic disease   Anemia work up revealing borderline low b12 levels  MRI of pelvis reviewed with IR    Findings consistent with benign bone lesions   Will hold off on bone biopsy  Recommend B12 injection once a wk for 4 wks and then once a month  If hemoglobin levels continue to decline then will consider bone marrow examination      PLAN:        Grace Casillas MD

## 2018-10-24 NOTE — PROGRESS NOTE ADULT - PROBLEM SELECTOR PLAN 4
care per niece lives with his sister and his niece   discharge home per last admission per ortho cont knee immobilizer   Dr Petty was called today

## 2018-10-25 ENCOUNTER — TRANSCRIPTION ENCOUNTER (OUTPATIENT)
Age: 68
End: 2018-10-25

## 2018-10-25 VITALS
TEMPERATURE: 98 F | OXYGEN SATURATION: 95 % | SYSTOLIC BLOOD PRESSURE: 117 MMHG | DIASTOLIC BLOOD PRESSURE: 55 MMHG | HEART RATE: 100 BPM | RESPIRATION RATE: 18 BRPM

## 2018-10-25 LAB
ANION GAP SERPL CALC-SCNC: 12 MMOL/L — SIGNIFICANT CHANGE UP (ref 5–17)
BUN SERPL-MCNC: 9 MG/DL — SIGNIFICANT CHANGE UP (ref 8–20)
CALCIUM SERPL-MCNC: 9.9 MG/DL — SIGNIFICANT CHANGE UP (ref 8.6–10.2)
CHLORIDE SERPL-SCNC: 101 MMOL/L — SIGNIFICANT CHANGE UP (ref 98–107)
CO2 SERPL-SCNC: 30 MMOL/L — HIGH (ref 22–29)
CREAT SERPL-MCNC: 0.74 MG/DL — SIGNIFICANT CHANGE UP (ref 0.5–1.3)
GLUCOSE BLDC GLUCOMTR-MCNC: 121 MG/DL — HIGH (ref 70–99)
GLUCOSE BLDC GLUCOMTR-MCNC: 145 MG/DL — HIGH (ref 70–99)
GLUCOSE SERPL-MCNC: 128 MG/DL — HIGH (ref 70–115)
HCT VFR BLD CALC: 34.5 % — LOW (ref 42–52)
HGB BLD-MCNC: 11.2 G/DL — LOW (ref 14–18)
MCHC RBC-ENTMCNC: 28.7 PG — SIGNIFICANT CHANGE UP (ref 27–31)
MCHC RBC-ENTMCNC: 32.5 G/DL — SIGNIFICANT CHANGE UP (ref 32–36)
MCV RBC AUTO: 88.5 FL — SIGNIFICANT CHANGE UP (ref 80–94)
PHOSPHATE SERPL-MCNC: 2.2 MG/DL — LOW (ref 2.4–4.7)
PLATELET # BLD AUTO: 333 K/UL — SIGNIFICANT CHANGE UP (ref 150–400)
POTASSIUM SERPL-MCNC: 3.3 MMOL/L — LOW (ref 3.5–5.3)
POTASSIUM SERPL-SCNC: 3.3 MMOL/L — LOW (ref 3.5–5.3)
RBC # BLD: 3.9 M/UL — LOW (ref 4.6–6.2)
RBC # FLD: 16.2 % — HIGH (ref 11–15.6)
SODIUM SERPL-SCNC: 143 MMOL/L — SIGNIFICANT CHANGE UP (ref 135–145)
WBC # BLD: 8.4 K/UL — SIGNIFICANT CHANGE UP (ref 4.8–10.8)
WBC # FLD AUTO: 8.4 K/UL — SIGNIFICANT CHANGE UP (ref 4.8–10.8)

## 2018-10-25 PROCEDURE — 99239 HOSP IP/OBS DSCHRG MGMT >30: CPT

## 2018-10-25 RX ORDER — TAMSULOSIN HYDROCHLORIDE 0.4 MG/1
1 CAPSULE ORAL
Qty: 30 | Refills: 0 | OUTPATIENT
Start: 2018-10-25

## 2018-10-25 RX ORDER — PREGABALIN 225 MG/1
1 CAPSULE ORAL
Qty: 30 | Refills: 0 | OUTPATIENT
Start: 2018-10-25 | End: 2018-11-23

## 2018-10-25 RX ORDER — POTASSIUM CHLORIDE 20 MEQ
40 PACKET (EA) ORAL EVERY 4 HOURS
Qty: 0 | Refills: 0 | Status: DISCONTINUED | OUTPATIENT
Start: 2018-10-25 | End: 2018-10-25

## 2018-10-25 RX ORDER — POTASSIUM PHOSPHATE, MONOBASIC POTASSIUM PHOSPHATE, DIBASIC 236; 224 MG/ML; MG/ML
15 INJECTION, SOLUTION INTRAVENOUS ONCE
Qty: 0 | Refills: 0 | Status: COMPLETED | OUTPATIENT
Start: 2018-10-25 | End: 2018-10-25

## 2018-10-25 RX ORDER — RISPERIDONE 4 MG/1
1 TABLET ORAL
Qty: 60 | Refills: 0 | OUTPATIENT
Start: 2018-10-25 | End: 2018-11-23

## 2018-10-25 RX ORDER — NIFEDIPINE 30 MG
1 TABLET, EXTENDED RELEASE 24 HR ORAL
Qty: 0 | Refills: 0 | COMMUNITY
Start: 2018-10-25

## 2018-10-25 RX ORDER — POTASSIUM CHLORIDE 20 MEQ
40 PACKET (EA) ORAL ONCE
Qty: 0 | Refills: 0 | Status: COMPLETED | OUTPATIENT
Start: 2018-10-25 | End: 2018-10-25

## 2018-10-25 RX ADMIN — LEVETIRACETAM 1000 MILLIGRAM(S): 250 TABLET, FILM COATED ORAL at 04:42

## 2018-10-25 RX ADMIN — SIMETHICONE 80 MILLIGRAM(S): 80 TABLET, CHEWABLE ORAL at 04:42

## 2018-10-25 RX ADMIN — Medication 1 MILLIGRAM(S): at 11:13

## 2018-10-25 RX ADMIN — RISPERIDONE 0.25 MILLIGRAM(S): 4 TABLET ORAL at 04:42

## 2018-10-25 RX ADMIN — Medication 40 MILLIEQUIVALENT(S): at 13:34

## 2018-10-25 RX ADMIN — SIMETHICONE 80 MILLIGRAM(S): 80 TABLET, CHEWABLE ORAL at 11:13

## 2018-10-25 RX ADMIN — Medication 0.25 MILLIGRAM(S): at 00:49

## 2018-10-25 RX ADMIN — POTASSIUM PHOSPHATE, MONOBASIC POTASSIUM PHOSPHATE, DIBASIC 62.5 MILLIMOLE(S): 236; 224 INJECTION, SOLUTION INTRAVENOUS at 14:15

## 2018-10-25 RX ADMIN — PREGABALIN 1000 MICROGRAM(S): 225 CAPSULE ORAL at 11:13

## 2018-10-25 RX ADMIN — Medication 30 MILLIGRAM(S): at 04:42

## 2018-10-25 RX ADMIN — HEPARIN SODIUM 5000 UNIT(S): 5000 INJECTION INTRAVENOUS; SUBCUTANEOUS at 13:38

## 2018-10-25 RX ADMIN — Medication 325 MILLIGRAM(S): at 11:12

## 2018-10-25 RX ADMIN — PANTOPRAZOLE SODIUM 40 MILLIGRAM(S): 20 TABLET, DELAYED RELEASE ORAL at 04:43

## 2018-10-25 NOTE — PROGRESS NOTE ADULT - PROBLEM SELECTOR PLAN 4
per last admission per ortho cont knee immobilizer   Dr Petty was called   knee immobilizer to be adjusted before discharge

## 2018-10-25 NOTE — DISCHARGE NOTE ADULT - MEDICATION SUMMARY - MEDICATIONS TO STOP TAKING
I will STOP taking the medications listed below when I get home from the hospital:    Xanax 0.25 mg oral tablet  -- 1 tab(s) by mouth 2 times a day, As Needed MDD:2  -- Avoid grapefruit and grapefruit juice while taking this medication.  Caution federal law prohibits the transfer of this drug to any person other  than the person for whom it was prescribed.  Do not take this drug if you are pregnant.  May cause drowsiness.  Alcohol may intensify this effect.  Use care when operating dangerous machinery.

## 2018-10-25 NOTE — DISCHARGE NOTE ADULT - MEDICATION SUMMARY - MEDICATIONS TO TAKE
I will START or STAY ON the medications listed below when I get home from the hospital:    aspirin 325 mg oral delayed release tablet  -- 1 tab(s) by mouth once a day  -- Indication: For Antiplattelet     tamsulosin 0.4 mg oral capsule  -- 1 cap(s) by mouth once a day (at bedtime)  -- Indication: For BPH     Keppra 1000 mg oral tablet  -- 1 tab(s) by mouth 2 times a day   -- Check with your doctor before becoming pregnant.  It is very important that you take or use this exactly as directed.  Do not skip doses or discontinue unless directed by your doctor.  May cause drowsiness or dizziness.  Obtain medical advice before taking any non-prescription drugs as some may affect the action of this medication.  Swallow whole.  Do not crush.  This drug may impair the ability to drive or operate machinery.  Use care until you become familiar with its effects.    -- Indication: For seziure     Crestor 20 mg oral tablet  -- 1 tab(s) by mouth once a day (at bedtime)  -- Indication: For Hyperlipidemia    risperiDONE 0.25 mg oral tablet  -- 1 tab(s) by mouth 2 times a day  -- Indication: For Agitation     Xanax 0.25 mg oral tablet  -- 1 tab(s) by mouth 2 times a day, As Needed MDD:2  -- Avoid grapefruit and grapefruit juice while taking this medication.  Caution federal law prohibits the transfer of this drug to any person other  than the person for whom it was prescribed.  Do not take this drug if you are pregnant.  May cause drowsiness.  Alcohol may intensify this effect.  Use care when operating dangerous machinery.    -- Indication: For Anxiety    NIFEdipine 30 mg oral tablet, extended release  -- 1 tab(s) by mouth once a day  -- Indication: For Hypertension, unspecified type    melatonin 3 mg oral tablet  -- 1 tab(s) by mouth once a day (at bedtime)  -- Indication: For insomnia    pantoprazole 40 mg oral delayed release tablet  -- 1 tab(s) by mouth once a day (before a meal)  -- Indication: For GERD    folic acid 1 mg oral tablet  -- 1 tab(s) by mouth once a day  -- Indication: For suppelemnt     B-12 1000 mcg oral tablet  -- 1 tab(s) by mouth once a day   -- Indication: For vit b 12 deficiency

## 2018-10-25 NOTE — PROGRESS NOTE ADULT - PROBLEM SELECTOR PLAN 7
cronic anemia with vit b 12 deficiency   added vit b 12 injection hb better cont supplement per hem   folate level low cont folic acid
cronic anemia with vit b 12 deficiency   added vit b 12 injection  folate level low cont folic acid

## 2018-10-25 NOTE — PROGRESS NOTE ADULT - PROBLEM SELECTOR PLAN 1
cont iv fluid   renal consult appreciated   work up in progress
improved with iv fluid, add glucerna   renal follow up appreciated  discussed with renal and IR re bone lesion MR result reviewed and likely   Mr of pelvis to evaluate for possible biopsy by IR
improved with iv fluid, cont oral fluid intake glucerna   renal on board
on iv fluid improving   renal follow up
on iv fluid improving   renal follow up appreciated  discussed with renal and IR re bone lesion  Mr of pelvis to evaluate for possible biopsy by IR
improved with iv fluid,   cont oral fluid intake glucerna   renal on board

## 2018-10-25 NOTE — PROGRESS NOTE ADULT - REASON FOR ADMISSION
sent from nursing [place] for Cr elevation
sent from nursing [place] for Cr elevation
sent from nursing [place] for Cr elevation
sent from nursing [place] for Cr elevation
sent from nursing [place] for Cr elevation
sent from nursing [place] for Cr elevation
sent from nursing [place] for Cr elevation
sent from nursing [place] for Cr elevation
sent from nursing [place] for Cr elevation
sent from nursing [place] for Cr elevation

## 2018-10-25 NOTE — DISCHARGE NOTE ADULT - CARE PLAN
Principal Discharge DX:	Acute renal failure, unspecified acute renal failure type  Goal:	imoroved  Assessment and plan of treatment:	cont to give glucerna and  fluid 1-1.5 liter daily  Secondary Diagnosis:	Hypertension, unspecified type  Assessment and plan of treatment:	stable  Secondary Diagnosis:	Anemia, unspecified type  Assessment and plan of treatment:	take folic acid and vit  b 12 supplement  follow up with hematologist dr Casillas  Secondary Diagnosis:	Hypokalemia  Assessment and plan of treatment:	replaced  Secondary Diagnosis:	Vitamin B 12 deficiency  Assessment and plan of treatment:	take daily ivit b 12  Secondary Diagnosis:	Diabetes mellitus of other type with complication  Assessment and plan of treatment:	cont metformin

## 2018-10-25 NOTE — PROGRESS NOTE ADULT - PROBLEM SELECTOR PLAN 3
replace
replace iv po K
replaced   improved
replaced , repeat lytes reviewed K 3.3 will give potassium 40 meqx1
replaced , resolved
replaced , resolved

## 2018-10-25 NOTE — PROGRESS NOTE ADULT - ASSESSMENT
1) BOUCHRA ; ?prerenal azotemia ;   2) Lytic bone lesion  3) HTN  4) DM    Check Cordell, UOsm, UCr, UCl  Check UA with micro  Check renal/bladder sonogram  Sent for free light chains / immunofixation ; was done in past and negative; calcium normal, doubt myeloma; workup of lytic bone lesion ; would consider oncology eval  Continue with IV NS @ 100cc/hr  d/w Dr. Howard
1) BOUCHRA ; ?prerenal azotemia ;   2) Lytic bone lesion  3) HTN  4) DM  5) Hypokalemia    SCr improving with IVF  Can decrease rate of fluids ;   Likely prerenal ; poor po  Previous immunofixation was negative ;   K+ being supplemented ;     Signing off  Please recall if needed    d/w Dr. Howard
67 yo male with MR , HTN , Diabetes Mellitus ,  seizure  admitted for ARF , lytic bone lesion on previous admission work up noted
67 yo male with MR , HTN , Diabetes Mellitus ,  seizure  admitted for ARF , lytic bone lesion on previous admission work up noted , MM ruled out
67 yo male with MR , HTN , Diabetes Mellitus ,  seizure  admitted for ARF , lytic bone lesion on previous admission work up noted , MM ruled out
69 yo male with MR , HTN , Diabetes Mellitus ,  seizure  admitted for ARF , lytic bone lesion on previous admission work up noted , MM ruled out , MRI performed bone lesion looks bening no bx needed , discussed with Ir and hem oncology , Vit b 12 level low , im injection ordered
69 yo male with MR , HTN , Diabetes Mellitus , seziure admitted for ARF
69 yo male with MR , HTN , Diabetes Mellitus ,  seizure  admitted for ARF , lytic bone lesion on previous admission work up noted , MM ruled out , MRI performed bone lesion looks bening no bx needed , discussed with Ir and hem oncology , Vit b 12 level low , im injection ordered hb improved   hypokalemia, hypophosphatemia replace iv kphos po k given

## 2018-10-25 NOTE — PROGRESS NOTE ADULT - SUBJECTIVE AND OBJECTIVE BOX
seen in am with  nurse Xochitl   pt is with c/o knee pain , no chest pain , no shortness of breath   no overnight events reported       Vital Signs Last 24 Hrs  T(C): 36.6 (25 Oct 2018 08:25), Max: 36.6 (25 Oct 2018 08:25)  T(F): 97.8 (25 Oct 2018 08:25), Max: 97.8 (25 Oct 2018 08:25)  HR: 104 (25 Oct 2018 08:25) (98 - 104)  BP: 150/77 (25 Oct 2018 08:25) (134/71 - 150/77)  BP(mean): --  RR: 19 (25 Oct 2018 08:25) (19 - 19)  SpO2: 95% (25 Oct 2018 08:25) (95% - 96%)  PHYSICAL EXAM:  GENERAL: NAD, obese resting in the bed comfortable   CHEST/LUNG: Clear to auscultation bilaterally; No rales  HEART: Regular rate and rhythm; S1 / S2  ABDOMEN: Soft, Nontender, Nondistended; Bowel sounds present  EXTREMITIES:  No clubbing, cyanosis, or edema, LLE knee immobilizer in place     Vitamin B12, Serum: 188 pg/mL (10.22.18 @ 08:26)                          11.2   8.4   )-----------( 333      ( 25 Oct 2018 08:20 )             34.5   10-25    143  |  101  |  9.0  ----------------------------<  128<H>  3.3<L>   |  30.0<H>  |  0.74    Ca    9.9      25 Oct 2018 08:20  Phos  2.2     10-25

## 2018-10-25 NOTE — DISCHARGE NOTE ADULT - SECONDARY DIAGNOSIS.
Hypertension, unspecified type Anemia, unspecified type Hypokalemia Vitamin B 12 deficiency Diabetes mellitus of other type with complication

## 2018-10-25 NOTE — DISCHARGE NOTE ADULT - PATIENT PORTAL LINK FT
You can access the [facility] Patient Portal, offered by [facility], by registering with the following website: [email or web address]

## 2018-10-25 NOTE — DISCHARGE NOTE ADULT - PLAN OF CARE
aileen cont to give glucerna and  fluid 1-1.5 liter daily stable take folic acid and vit  b 12 supplement  follow up with hematologist dr Casillas replaced take daily ivit b 12 cont metformin

## 2018-10-25 NOTE — PROGRESS NOTE ADULT - PROBLEM SELECTOR PLAN 2
cont sliding scale insulin coverage
cont sliding scale insulin coverage, BS controlled
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
## 2018-10-25 NOTE — DISCHARGE NOTE ADULT - CARE PROVIDER_API CALL
Grace Casillas), Hematology; Internal Medicine; Medical Oncology  [address] [place] [address]  Phone: [phone]  Fax: [phone]    Osvaldo Petty), Orthopaedic Surgery  [address] [place] [address]  Phone: [phone]  Fax: [phone]

## 2018-10-25 NOTE — PROGRESS NOTE ADULT - PROVIDER SPECIALTY LIST ADULT
Heme/Onc
Heme/Onc
Hospitalist
Nephrology
Nephrology
Hospitalist

## 2018-11-27 PROCEDURE — 85027 COMPLETE CBC AUTOMATED: CPT

## 2018-11-27 PROCEDURE — 94760 N-INVAS EAR/PLS OXIMETRY 1: CPT

## 2018-11-27 PROCEDURE — 81001 URINALYSIS AUTO W/SCOPE: CPT

## 2018-11-27 PROCEDURE — 82962 GLUCOSE BLOOD TEST: CPT

## 2018-11-27 PROCEDURE — 82746 ASSAY OF FOLIC ACID SERUM: CPT

## 2018-11-27 PROCEDURE — 97542 WHEELCHAIR MNGMENT TRAINING: CPT

## 2018-11-27 PROCEDURE — 83521 IG LIGHT CHAINS FREE EACH: CPT

## 2018-11-27 PROCEDURE — 96366 THER/PROPH/DIAG IV INF ADDON: CPT | Mod: XU

## 2018-11-27 PROCEDURE — 93306 TTE W/DOPPLER COMPLETE: CPT

## 2018-11-27 PROCEDURE — 97530 THERAPEUTIC ACTIVITIES: CPT

## 2018-11-27 PROCEDURE — 73590 X-RAY EXAM OF LOWER LEG: CPT

## 2018-11-27 PROCEDURE — 82607 VITAMIN B-12: CPT

## 2018-11-27 PROCEDURE — 84165 PROTEIN E-PHORESIS SERUM: CPT

## 2018-11-27 PROCEDURE — 82436 ASSAY OF URINE CHLORIDE: CPT

## 2018-11-27 PROCEDURE — 84100 ASSAY OF PHOSPHORUS: CPT

## 2018-11-27 PROCEDURE — 36415 COLL VENOUS BLD VENIPUNCTURE: CPT

## 2018-11-27 PROCEDURE — 94640 AIRWAY INHALATION TREATMENT: CPT

## 2018-11-27 PROCEDURE — 76775 US EXAM ABDO BACK WALL LIM: CPT

## 2018-11-27 PROCEDURE — 84166 PROTEIN E-PHORESIS/URINE/CSF: CPT

## 2018-11-27 PROCEDURE — 86334 IMMUNOFIX E-PHORESIS SERUM: CPT

## 2018-11-27 PROCEDURE — 84155 ASSAY OF PROTEIN SERUM: CPT

## 2018-11-27 PROCEDURE — 73562 X-RAY EXAM OF KNEE 3: CPT

## 2018-11-27 PROCEDURE — 84484 ASSAY OF TROPONIN QUANT: CPT

## 2018-11-27 PROCEDURE — 70450 CT HEAD/BRAIN W/O DYE: CPT

## 2018-11-27 PROCEDURE — 96365 THER/PROPH/DIAG IV INF INIT: CPT | Mod: XU

## 2018-11-27 PROCEDURE — 82784 ASSAY IGA/IGD/IGG/IGM EACH: CPT

## 2018-11-27 PROCEDURE — 99285 EMERGENCY DEPT VISIT HI MDM: CPT

## 2018-11-27 PROCEDURE — 95819 EEG AWAKE AND ASLEEP: CPT

## 2018-11-27 PROCEDURE — 71045 X-RAY EXAM CHEST 1 VIEW: CPT

## 2018-11-27 PROCEDURE — 87086 URINE CULTURE/COLONY COUNT: CPT

## 2018-11-27 PROCEDURE — 99285 EMERGENCY DEPT VISIT HI MDM: CPT | Mod: 25

## 2018-11-27 PROCEDURE — 87449 NOS EACH ORGANISM AG IA: CPT

## 2018-11-27 PROCEDURE — 87798 DETECT AGENT NOS DNA AMP: CPT

## 2018-11-27 PROCEDURE — 80061 LIPID PANEL: CPT

## 2018-11-27 PROCEDURE — 97163 PT EVAL HIGH COMPLEX 45 MIN: CPT

## 2018-11-27 PROCEDURE — 87880 STREP A ASSAY W/OPTIC: CPT

## 2018-11-27 PROCEDURE — 96361 HYDRATE IV INFUSION ADD-ON: CPT

## 2018-11-27 PROCEDURE — 80053 COMPREHEN METABOLIC PANEL: CPT

## 2018-11-27 PROCEDURE — T1013: CPT

## 2018-11-27 PROCEDURE — 84300 ASSAY OF URINE SODIUM: CPT

## 2018-11-27 PROCEDURE — 82728 ASSAY OF FERRITIN: CPT

## 2018-11-27 PROCEDURE — 71260 CT THORAX DX C+: CPT

## 2018-11-27 PROCEDURE — 87633 RESP VIRUS 12-25 TARGETS: CPT

## 2018-11-27 PROCEDURE — 92526 ORAL FUNCTION THERAPY: CPT

## 2018-11-27 PROCEDURE — 83605 ASSAY OF LACTIC ACID: CPT

## 2018-11-27 PROCEDURE — 84466 ASSAY OF TRANSFERRIN: CPT

## 2018-11-27 PROCEDURE — 87040 BLOOD CULTURE FOR BACTERIA: CPT

## 2018-11-27 PROCEDURE — 87581 M.PNEUMON DNA AMP PROBE: CPT

## 2018-11-27 PROCEDURE — 72195 MRI PELVIS W/O DYE: CPT

## 2018-11-27 PROCEDURE — 83550 IRON BINDING TEST: CPT

## 2018-11-27 PROCEDURE — 96375 TX/PRO/DX INJ NEW DRUG ADDON: CPT | Mod: XU

## 2018-11-27 PROCEDURE — 93970 EXTREMITY STUDY: CPT

## 2018-11-27 PROCEDURE — 80048 BASIC METABOLIC PNL TOTAL CA: CPT

## 2018-11-27 PROCEDURE — 87081 CULTURE SCREEN ONLY: CPT

## 2018-11-27 PROCEDURE — 83735 ASSAY OF MAGNESIUM: CPT

## 2018-11-27 PROCEDURE — 82570 ASSAY OF URINE CREATININE: CPT

## 2018-11-27 PROCEDURE — 85610 PROTHROMBIN TIME: CPT

## 2018-11-27 PROCEDURE — 93005 ELECTROCARDIOGRAM TRACING: CPT

## 2018-11-27 PROCEDURE — 74177 CT ABD & PELVIS W/CONTRAST: CPT

## 2018-11-27 PROCEDURE — G0103: CPT

## 2018-11-27 PROCEDURE — 84133 ASSAY OF URINE POTASSIUM: CPT

## 2018-11-27 PROCEDURE — 92610 EVALUATE SWALLOWING FUNCTION: CPT

## 2018-11-27 PROCEDURE — 87486 CHLMYD PNEUM DNA AMP PROBE: CPT

## 2018-11-27 PROCEDURE — 96367 TX/PROPH/DG ADDL SEQ IV INF: CPT | Mod: XU

## 2018-11-27 PROCEDURE — 84145 PROCALCITONIN (PCT): CPT

## 2018-11-27 PROCEDURE — 87186 SC STD MICRODIL/AGAR DIL: CPT

## 2018-11-27 PROCEDURE — 97110 THERAPEUTIC EXERCISES: CPT

## 2018-11-27 PROCEDURE — 83935 ASSAY OF URINE OSMOLALITY: CPT

## 2018-11-27 PROCEDURE — 84146 ASSAY OF PROLACTIN: CPT

## 2019-05-01 PROCEDURE — 87081 CULTURE SCREEN ONLY: CPT

## 2019-05-01 PROCEDURE — G9005: CPT

## 2019-10-15 NOTE — PROGRESS NOTE ADULT - PROBLEM SELECTOR PROBLEM 3
----- Message from Dilan Loving MD sent at 10/15/2019 10:03 AM CDT -----  Please notify patient with result. GERD, neg Mas's. Gastritis, neg HP. PPI. EUS FNA of the mediastinal lymph node is benign    
Acute renal failure with acute cortical necrosis

## 2019-12-09 NOTE — ED ADULT NURSE NOTE - PAIN RATING/NUMBER SCALE (0-10): REST
Plan: Liquid nitrogen applied. Wound care reviewed in detail with the patient. Patient understands and agrees. Detail Level: Simple 0
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
## 2021-03-16 NOTE — ED ADULT NURSE NOTE - NS ED NURSE RECORD ANOTHER HT AND WT
Addended by: Dorian Choi on: 3/16/2021 08:44 AM     Modules accepted: Orders
Addended by: Juli Saint on: 3/16/2021 08:47 AM     Modules accepted: Orders
Yes

## 2021-09-24 NOTE — CONSULT NOTE ADULT - REASON FOR ADMISSION
Eddi Schrader Patient Age: 70 year old  MESSAGE:   Reason for Neuropsych Testing? Other MEMORY CHANGES     Patient advised they need to call their insurance company and ask if testing will be covered:        Has the patient been tested previously for ADHD:        Referred By?    DR SIEGEL    For testing purposes, what is your first language?    ENGLISH    Are you currently in litigation over this issue, or do you intend to pursue litigation in the future?        Verify Insurance Information (Is card on file correct? If not, get Insurance Name, ID #, and Insurance Phone #)     MEDICARE    Schedule Appointment Using the Following Visit Type:    TEST (4231)    Appointment is scheduled for:   February 21, 2022 @ 9 AM     Best Phone Number to Confirm the Appointment:   [phone]    Patient Advised of Instructions Below?   Yes    Message Routed to Dr Alfonso and SARINA  GER PSR Pool? Yes        Patient Instructions: (Mailed to patient as well):  · Family: It is highly encouraged for family members to attend the interview and give information. Family members cannot be present during the testing but can wait in the waiting room, if they like, for the duration of the appointment.    · Duration of Test  Standard Adult Evaluation: The patient and their family meet with Dr Alfonso for the first 45-60 minutes only. Dr Alfonso will review the process of the evaluation at this time. The total visit is approximately 2-4 hours.    · *ADHD Medications and Other Stimulants (only for ADD testing): Please limit your caffeine intake to one cup on the day of testing. DO NOT take any ADHD/ADD medications on the day of testing. All other medications are okay to take.    · *History Questionnaire: (not used for ADD testing): A history questionnaire will be mailed to you. Please fill it out and bring it to the appointment. A family member can fill out the questionnaire if the patient is unable to do so.    · *Additional Items to Bring:  Please bring any previous psychological, neuropsychological testing results; or neuroimaging such as CT or MRIs if not completed here at [facility].     · Follow Up and Test Results: A feedback session with Dr Alfonso will be scheduled within 2 weeks after testing (unless specified otherwise after discussion with patient and/or family). A copy of the neuropsychological report will be forwarded to the referring physician two days prior to your appointment with the physician. Please let Dr Alfonso know on the day of your testing when your appointment is scheduled with the physician receiving the report so that we can ensure it is received on time.    · Report Releases: If you would like Dr Alfonso to send the formal report to anyone outside of [facility], please let us know the day of the testing so that we can have you sign a Release of Information for that party.    · Cancellations: Dr Alfonso needs 3 days’ notice for cancellations due to the length of his appointments.             WEIGHT AND HEIGHT:   Wt Readings from Last 1 Encounters:   09/15/21 66.2 kg (146 lb)     Ht Readings from Last 1 Encounters:   09/15/21 5' 10\" (1.778 m)     BMI Readings from Last 1 Encounters:   09/15/21 20.95 kg/m²       ALLERGIES:  Diphtheria-tetanus toxoids, Tramadol, and Pravastatin  Current Outpatient Medications   Medication   • finasteride (PROSCAR) 5 MG tablet   • HYDROcodone-acetaminophen (Norco) 5-325 MG per tablet   • polyethylene glycol (MIRALAX) 17 GM/SCOOP powder   • meloxicam (MOBIC) 15 MG tablet   • aspirin 325 MG EC tablet   • gabapentin (NEURONTIN) 100 MG capsule   • Probiotic Product (FLORAJEN DIGESTION PO)   • sildenafil (VIAGRA) 100 MG tablet   • pantoprazole (PROTONIX) 40 MG tablet   • halobetasol (ULTRAVATE) 0.05 % ointment   • Calcium Carb-Cholecalciferol (CALCIUM + D3) 600-200 MG-UNIT Tab   • acetaminophen (TYLENOL) 650 MG CR tablet     No current facility-administered medications for this visit.     PHARMACY to use:             Pharmacy preference(s) on file:   [id number] - [place], [place] - [address]  [address]  [facility] [address]  Phone: [phone] Fax: [phone]      CALL BACK INFO: DO NOT LEAVE A MESSAGE - contact patient directly  ROUTING: Patient's physician/staff        PCP: Shilpa Roberts DO         INS: Payor: [employer]VANTAGE / [facility] ([facility] / Product Type: [facility] MEDICARE ADVANTAGE   PATIENT ADDRESS:  [address] [facility] [address] [address]     Altered MS

## 2021-12-15 NOTE — ED PROVIDER NOTE - NS ED MD DISPO ISOLATION TYPES
Please follow up in about one month for a nurse visit catheter change.     It was a pleasure meeting with you today.  Thank you for allowing me and my team the privilege of caring for you today.  YOU are the reason we are here, and I truly hope we provided you with the excellent service you deserve.  Please let us know if there is anything else we can do for you so that we can be sure you are leaving completely satisfied with your care experience.           None

## 2022-02-14 NOTE — DISCHARGE NOTE ADULT - PATIENT PORTAL LINK FT
You can access the [facility] Patient Portal, offered by [facility], by registering with the following website: [email or web address]
No

## 2022-04-13 NOTE — BEHAVIORAL HEALTH ASSESSMENT NOTE - NSBHVIOLPROTECT_PSY_A_CORE
Residential stability Hydroxyzine Pregnancy And Lactation Text: This medication is not safe during pregnancy and should not be taken. It is also excreted in breast milk and breast feeding isn't recommended.

## 2022-05-19 NOTE — ED PROVIDER NOTE - PMH
Anemia, unspecified type    Diabetes mellitus of other type with complication    Hyperlipidemia, unspecified hyperlipidemia type    Hypertension, unspecified type Niacinamide Counseling: I recommended taking niacin or niacinamide, also know as vitamin B3, twice daily. Recent evidence suggests that taking vitamin B3 (500 mg twice daily) can reduce the risk of actinic keratoses and non-melanoma skin cancers. Side effects of vitamin B3 include flushing and headache.

## 2022-06-16 NOTE — ED ADULT TRIAGE NOTE - CHIEF COMPLAINT QUOTE
Patient brought in by ambulance Alert/disoriented, sent ED for acting out, patient was just discharged from [facility] yesterday for UTI, family believes either infection is back or having a reaction to ABX.
No

## 2023-01-01 NOTE — PROGRESS NOTE BEHAVIORAL HEALTH - NSBHCHARTREVIEWVS_PSY_A_CORE FT
Vital Signs Last 24 Hrs  T(C): 37.1 (02 Oct 2018 15:45), Max: 37.1 (01 Oct 2018 17:13)  T(F): 98.7 (02 Oct 2018 15:45), Max: 98.7 (01 Oct 2018 17:13)  HR: 99 (02 Oct 2018 15:45) (65 - 99)  BP: 142/74 (02 Oct 2018 15:45) (142/74 - 160/73)  BP(mean): --  RR: 20 (02 Oct 2018 15:45) (18 - 20)  SpO2: 96% (02 Oct 2018 15:45) (93% - 98%)
Vital Signs Last 24 Hrs  T(C): 37.1 (02 Oct 2018 15:45), Max: 37.1 (01 Oct 2018 17:13)  T(F): 98.7 (02 Oct 2018 15:45), Max: 98.7 (01 Oct 2018 17:13)  HR: 99 (02 Oct 2018 15:45) (65 - 99)  BP: 142/74 (02 Oct 2018 15:45) (142/74 - 160/73)  BP(mean): --  RR: 20 (02 Oct 2018 15:45) (18 - 20)  SpO2: 96% (02 Oct 2018 15:45) (93% - 98%)
[id number]

## 2024-05-06 NOTE — BEHAVIORAL HEALTH ASSESSMENT NOTE - AFFECT CONGRUENCE
separate and distinct time from the therapeutic activities interventions . (see flow sheet as applicable)   MFR and TPR to (L) infra and (L) pec.   48 48 [facility] BC Totals Reminder: bill using total billable min of TIMED therapeutic procedures (example: do not include dry needle or estim unattended, both untimed codes, in totals to left)  8-22 min = 1 unit; 23-37 min = 2 units; 38-52 min = 3 units; 53-67 min = 4 units; 68-82 min = 5 units   Total Total       [x]  Patient Education billed concurrently with other procedures   [x] Review HEP    [] Progressed/Changed HEP, detail:    [] Other detail:       Objective Information/Functional Measures/Assessment:  Pt is 5.5wks post op and AAROM and PROM is to tolerance per protocol.  Added rows GTB to progress scap squeezes  Progressed to 3# for biceps/triceps   Mild pain with IR isometric with submax contraction.   AROM flexion: 92deg    Progress toward goals / Updated goals:  []  See Progress Note/Recertification    Short Term Goals: To be accomplished in 12 treatments   Pt will report compliance and independence to HEP to help the pt manage their pain and symptoms.  Status at last note/certification:  verbally established  Current: reports compliance several times a day. 4/29/2024  2.     Pt will improve PROM left shoulder flex to 140 degs (when able per protocol) to improve progression through protocol and therapy interventions.   Status at last note/certification: to 90 degs today in supine  Current: 109 deg 4/29/29  3. Pt will increase FOTO score by 15 points to improve ability to perform ADLs.  Status at last note/certification: 4 points total  Current: 36, MET  4/29/24  4. Pt will increase PROM left shoulder ER to 30 degs to improve ease of donning/doffing sling.  Status at last note/certification: neutral  ER:25deg, progressing  4/29/24  Long Term Goals: To be accomplished in 36 treatments  Pt will increase FOTO score to 56 points to improve ability to perform  Congruent

## 2024-09-06 NOTE — PROGRESS NOTE BEHAVIORAL HEALTH - BODY HABITUS
I instructed patient to discontinue the ASA as it has been more than 2 weeks post procedure: this is the instruction on the discharge paper.  Continue the plavix and the eliquis.  Follow up w Cardiologist next week as planned.  
Well nourished
Well nourished

## 2025-01-08 NOTE — ED PROVIDER NOTE - NS_EDPROVIDERDISPOUSERTYPE_ED_A_ED
Pt completed KCQQ12 questionnaire   Scribe Attestation (For Scribes USE Only)... Attending Attestation (For Attendings USE Only).../Scribe Attestation (For Scribes USE Only)...

## 2025-02-14 NOTE — PHYSICAL THERAPY INITIAL EVALUATION ADULT - ACTIVE RANGE OF MOTION EXAMINATION, REHAB EVAL
50 50 50 Right LE Active ROM was WFL (within functional limits)/left LE hip flexion , abduction adduction 2-/5,  knee NA/bilateral upper extremity Active ROM was WFL (within functional limits)